# Patient Record
Sex: FEMALE | Race: WHITE | Employment: OTHER | URBAN - METROPOLITAN AREA
[De-identification: names, ages, dates, MRNs, and addresses within clinical notes are randomized per-mention and may not be internally consistent; named-entity substitution may affect disease eponyms.]

---

## 2017-01-06 ENCOUNTER — TELEPHONE (OUTPATIENT)
Dept: FAMILY MEDICINE | Facility: CLINIC | Age: 67
End: 2017-01-06

## 2017-01-06 NOTE — TELEPHONE ENCOUNTER
Per [facility] pharmacy ([phone]) patient refilled Pantoprazole in November, and Levothyroxine on 12-17, with no issues.  Not sure why patient is saying PA is needed?  LM for patient to return call to clarify. Leidy Fuentes,

## 2017-01-06 NOTE — TELEPHONE ENCOUNTER
Per Dr.Gray Sahu PA.  Pt has tried other med's besides Protonix and they worked, but they would change formulary's.  Nelda Appiah RN

## 2017-01-06 NOTE — TELEPHONE ENCOUNTER
Patient returned call.  I informed her that I had spoken with [facility] and patient will need to wait until she is due for her next refill on the Levothyroxine before we can do a PA (if one is needed).  Also pharmacy stated that they have a prescription of the Pantoprazole (60 for 30) that is ready for patient to  with a zero $ copay.  No PA is needed.  Patient informed. Leidy Fuentes,

## 2017-01-06 NOTE — TELEPHONE ENCOUNTER
Reason for call: Medication   If this is a refill request, has the caller requested the refill from the pharmacy already? Yes  Will the patient be using a [facility]? No  Name of the pharmacy and phone number for the current request: [id number] - [place], [place] - [address]    Name of the medication requested: Pantoprazole, levothyroxine    Other request: Patient states insurance will not cover levothyroxine and she needs either prior approval or something else. Also, she states insurance will not cover (2) a day of Pantoprazole as prescribed unless told by provider. Please advise on next steps.    Phone Number Pt can be reached at: Cell number on file:    Telephone Information:   Mobile [phone]     Best Time: anytime  Can we leave a detailed message on this number? YES

## 2017-01-20 DIAGNOSIS — E03.9 HYPOTHYROIDISM, UNSPECIFIED TYPE: Primary | ICD-10-CM

## 2017-01-20 NOTE — TELEPHONE ENCOUNTER
levothyroxine (SYNTHROID, LEVOTHROID) 75 MCG tablet     Last Written Prescription Date: 6/21/16  Last Quantity: 90, # refills: 1  Last Office Visit with [facility], [facility] or [facility] prescribing provider: 7/7/16        TSH   Date Value Ref Range Status   12/29/2015 1.24 0.40 - 4.00 mU/L Final

## 2017-01-24 RX ORDER — LEVOTHYROXINE SODIUM 75 UG/1
TABLET ORAL
Qty: 30 TABLET | Refills: 0 | Status: SHIPPED
Start: 2017-01-24 | End: 2017-02-27

## 2017-01-24 NOTE — TELEPHONE ENCOUNTER
Routing refill request to provider for review/approval because:  Labs not current:    TSH   Date Value Ref Range Status   12/29/2015 1.24 0.40 - 4.00 mU/L Final

## 2017-02-15 ENCOUNTER — TELEPHONE (OUTPATIENT)
Dept: FAMILY MEDICINE | Facility: CLINIC | Age: 67
End: 2017-02-15

## 2017-02-15 DIAGNOSIS — M19.041 PRIMARY OSTEOARTHRITIS OF BOTH HANDS: ICD-10-CM

## 2017-02-15 DIAGNOSIS — K21.9 GERD (GASTROESOPHAGEAL REFLUX DISEASE): Primary | ICD-10-CM

## 2017-02-15 DIAGNOSIS — M19.042 PRIMARY OSTEOARTHRITIS OF BOTH HANDS: ICD-10-CM

## 2017-02-15 NOTE — TELEPHONE ENCOUNTER
Received fax from pharmacy stating patient requires Prior Authorization for Pantoprazole sodium ec 40 mg tab .     Insurance information:   Name: Medicare  Phone number: [phone]  ID number: [id number]    Initiate prior authorization or change medication?    If a prior authorization is to be initiated, please list the following:    -any medications the patient has tried and failed or any contraindications.  -is the patient currently on this medication, or has tried before?  -What is the diagnosis?  -Justification or other information that may be helpful.

## 2017-02-15 NOTE — TELEPHONE ENCOUNTER
Received fax from pharmacy stating patient requires Prior Authorization for Diclofenac sodium 1% gel parp.     Insurance information:   Name: [employer]  Phone number: [phone]  ID number: [id number]    Initiate prior authorization or change medication?    If a prior authorization is to be initiated, please list the following:    -any medications the patient has tried and failed or any contraindications.  -is the patient currently on this medication, or has tried before?  -What is the diagnosis?  -Justification or other information that may be helpful.

## 2017-02-16 NOTE — TELEPHONE ENCOUNTER
PA done over the phone with [facility].  Was told to expect decision within 24-48 hours. Leidy Fuentes,

## 2017-02-17 ENCOUNTER — TELEPHONE (OUTPATIENT)
Dept: FAMILY MEDICINE | Facility: CLINIC | Age: 67
End: 2017-02-17

## 2017-02-20 ENCOUNTER — TELEPHONE (OUTPATIENT)
Dept: FAMILY MEDICINE | Facility: CLINIC | Age: 67
End: 2017-02-20

## 2017-02-20 NOTE — TELEPHONE ENCOUNTER
Called pharmacy and phone was busy, will call back at a later time.     Lia VAZQUEZ CMA ([facility])

## 2017-02-20 NOTE — TELEPHONE ENCOUNTER
Reason for Call:  Other prescription    Detailed comments: Patient states her pharmacy is still waiting for the prescription that would replace pantoprazole which isn't covered by her insurance. Patient states she's completely.    Phone Number Patient can be reached at: Home number on file [phone] (home)    Best Time: anytime    Can we leave a detailed message on this number? YES    Call taken on 2/20/2017 at 9:58 AM by Shanti Souza

## 2017-02-20 NOTE — TELEPHONE ENCOUNTER
Pantoprazole was already changed to Omeprazole. Patient was updated. See 2/15/17 phone encounter.    Called [facility] to get more information.  Per Alana with [facility], Omeprazole has a quantity limit. Insurance will only allow 90 caps for a 1 year supply.    Please contact patient.  She will need to contact her insurance to see what her plan covers.  Which alternative will they cover in place for Pantoprazole?  Would they cover the Pantoprazole if she was on once daily dosing as oppose to BID?  She can purchase meds OTC in the interim      Coral Milner RN

## 2017-02-21 NOTE — TELEPHONE ENCOUNTER
Attempted to reach patient recording that patient is not available unable to leave a message. Clare Kc MA

## 2017-02-24 ENCOUNTER — TELEPHONE (OUTPATIENT)
Dept: FAMILY MEDICINE | Facility: CLINIC | Age: 67
End: 2017-02-24

## 2017-02-24 NOTE — TELEPHONE ENCOUNTER
Reason for Call:  Other prescription    Detailed comments: patient states her insurance carrier will not cover the prescription Omeprazole , the patient states a call has to be made to her insurance carrier( Prior Auth). Please contact the patient to discuss further   Phone Number Patient can be reached at: Home number on file [phone] (home)    Best Time: today    Can we leave a detailed message on this number? YES    Call taken on 2/24/2017 at 1:06 PM by Wili Telles

## 2017-02-24 NOTE — TELEPHONE ENCOUNTER
Sent.  But system stated that PA is needed and prescription has not gone thru to destination as it is awaiting payer's response.    Medication Detail         Disp Refills Start End JOURDAN     diclofenac (VOLTAREN) 1 % GEL topical gel 100 g 0 2/24/2017  No     Sig: Apply  2 grams to hands four times daily using enclosed dosing card.     Class: E-Prescribe     Order: [id number]     Prior authorization: Waiting for payer response     This order has not been released to its destination.       Called pharmacy to call in the verbal prescription.  Gwen took the verbal order    Coral Milner RN

## 2017-02-24 NOTE — TELEPHONE ENCOUNTER
Letter received in mail stating PA for Diclofenac gel 1% has been approved.  Dates are 17 to 18.  Reference number is [id number].  [facility] pharmacy ([phone]) called and informed.      Pharmacy tried to run prescription and stated it  today, and will need new prescription sent in.    diclofenac (VOLTAREN) 1 % GEL     Last Written Prescription Date: 16  Last Quantity: 100g, # refills: 1  Last Office Visit with [facility], P or [facility] prescribing provider: 3-18-16       Creatinine   Date Value Ref Range Status   2016 0.89 mg/dL Final     Lab Results   Component Value Date    AST 20 10/31/2015     Lab Results   Component Value Date    ALT 18 10/31/2015     BP Readings from Last 3 Encounters:   16 137/76   10/23/16 138/82   16 114/66         Leidy Fuentes,

## 2017-02-27 DIAGNOSIS — E03.9 HYPOTHYROIDISM, UNSPECIFIED TYPE: ICD-10-CM

## 2017-02-28 RX ORDER — LEVOTHYROXINE SODIUM 75 UG/1
75 TABLET ORAL DAILY
Qty: 30 TABLET | Refills: 0 | Status: SHIPPED
Start: 2017-02-28 | End: 2017-04-03

## 2017-02-28 NOTE — TELEPHONE ENCOUNTER
levothyroxine (SYNTHROID/LEVOTHROID) 75 MCG tablet     Last Written Prescription Date: 1/24/17  Last Quantity: 30, # refills: 0  Last Office Visit with [facility], [facility] or [facility] prescribing provider: 7/7/16        TSH   Date Value Ref Range Status   12/29/2015 1.24 0.40 - 4.00 mU/L Final

## 2017-02-28 NOTE — TELEPHONE ENCOUNTER
Medication is being filled for 1 time refill only due to:  Patient needs to be seen because it has been more than one year since last visit.   Neeta Guevara RN

## 2017-03-01 NOTE — TELEPHONE ENCOUNTER
2nd message left for patient to return call.  I called [facility] pharmacy ([phone]) to see if they knew if anything is needed from us.  They stated that her insurance will only cover 90 capsules per year of the omeprazole, but that there is no issue with the pantoprazole.  Left message with patient to see if she is okay with this, or if there is something else we need to do.  Leidy Fuentes,

## 2017-03-02 ENCOUNTER — TELEPHONE (OUTPATIENT)
Dept: FAMILY MEDICINE | Facility: CLINIC | Age: 67
End: 2017-03-02

## 2017-03-02 NOTE — TELEPHONE ENCOUNTER
See 3-2-17, telephone encounter for further information.  I called [facility] ([phone]) to do PA over the phone for twice a day dosing.  This was approved effective dates of 3-2-17 to 3-2-18.  Case ID is [id number].  Patient cannot fill until March 14th, since she just had it filled yesterday.  [facility] pharmacy ([phone]) called and informed.  for patient regarding this. Leidy Fuentes,

## 2017-03-02 NOTE — TELEPHONE ENCOUNTER
Reason for Call:  Other prescription    Detailed comments: Qty limited exception is needed for the prescription Pantoprazole Sodium 40mg , per the patient insurance carrier please call Benefit prior Authorization  [phone]    [facility]      Phone Number Patient can be reached at: Cell number on file:    Telephone Information:   Mobile [phone]       Best Time: today    Can we leave a detailed message on this number? YES    Call taken on 3/2/2017 at 8:12 AM by Wili Telles

## 2017-03-10 ENCOUNTER — TELEPHONE (OUTPATIENT)
Dept: FAMILY MEDICINE | Facility: CLINIC | Age: 67
End: 2017-03-10

## 2017-03-15 DIAGNOSIS — F33.1 MAJOR DEPRESSIVE DISORDER, RECURRENT EPISODE, MODERATE (H): ICD-10-CM

## 2017-03-15 NOTE — TELEPHONE ENCOUNTER
Escitalopram     Last Written Prescription Date: 7/21/16  Last Fill Quantity: 90, # refills: 1  Last Office Visit with [facility] primary care provider:  7/7/16   Next 5 appointments (look out 90 days)     Apr 03, 2017  4:30 PM CDT   Office Visit with Frannie Allen MD   [facility] ([facility]    [address] [address]   [phone]                   Last PHQ-9 score on record=   PHQ-9 SCORE 1/2/2017   Total Score -   Total Score 4

## 2017-03-16 RX ORDER — ESCITALOPRAM OXALATE 20 MG/1
TABLET ORAL
Qty: 25 TABLET | Refills: 1 | Status: SHIPPED | OUTPATIENT
Start: 2017-03-16 | End: 2017-04-03

## 2017-03-16 NOTE — TELEPHONE ENCOUNTER
Prescription approved per [facility] Refill Protocol.  Coral Milner RN

## 2017-03-31 DIAGNOSIS — E03.9 HYPOTHYROIDISM, UNSPECIFIED TYPE: ICD-10-CM

## 2017-03-31 RX ORDER — LEVOTHYROXINE SODIUM 75 UG/1
TABLET ORAL
Qty: 30 TABLET | Refills: 0 | Status: CANCELLED | OUTPATIENT
Start: 2017-03-31

## 2017-04-03 ENCOUNTER — OFFICE VISIT (OUTPATIENT)
Dept: FAMILY MEDICINE | Facility: CLINIC | Age: 67
End: 2017-04-03
Payer: COMMERCIAL

## 2017-04-03 ENCOUNTER — RADIANT APPOINTMENT (OUTPATIENT)
Dept: MAMMOGRAPHY | Facility: CLINIC | Age: 67
End: 2017-04-03
Attending: INTERNAL MEDICINE
Payer: COMMERCIAL

## 2017-04-03 VITALS
WEIGHT: 142 LBS | TEMPERATURE: 98.5 F | OXYGEN SATURATION: 98 % | HEIGHT: 60 IN | HEART RATE: 80 BPM | SYSTOLIC BLOOD PRESSURE: 136 MMHG | BODY MASS INDEX: 27.88 KG/M2 | DIASTOLIC BLOOD PRESSURE: 74 MMHG

## 2017-04-03 DIAGNOSIS — E03.9 HYPOTHYROIDISM, UNSPECIFIED TYPE: ICD-10-CM

## 2017-04-03 DIAGNOSIS — Z12.4 SCREENING FOR MALIGNANT NEOPLASM OF CERVIX: ICD-10-CM

## 2017-04-03 DIAGNOSIS — Z12.31 VISIT FOR SCREENING MAMMOGRAM: ICD-10-CM

## 2017-04-03 DIAGNOSIS — Z23 NEED FOR PROPHYLACTIC VACCINATION WITH TETANUS-DIPHTHERIA (TD): ICD-10-CM

## 2017-04-03 DIAGNOSIS — F33.1 MAJOR DEPRESSIVE DISORDER, RECURRENT EPISODE, MODERATE (H): ICD-10-CM

## 2017-04-03 DIAGNOSIS — J43.1 PANLOBULAR EMPHYSEMA (H): ICD-10-CM

## 2017-04-03 DIAGNOSIS — N28.9 RENAL INSUFFICIENCY: ICD-10-CM

## 2017-04-03 DIAGNOSIS — I25.10 CORONARY ARTERY DISEASE INVOLVING NATIVE CORONARY ARTERY OF NATIVE HEART WITHOUT ANGINA PECTORIS: ICD-10-CM

## 2017-04-03 DIAGNOSIS — Z00.00 ROUTINE GENERAL MEDICAL EXAMINATION AT A HEALTH CARE FACILITY: Primary | ICD-10-CM

## 2017-04-03 DIAGNOSIS — F43.9 STRESS: ICD-10-CM

## 2017-04-03 DIAGNOSIS — F41.9 ANXIETY: ICD-10-CM

## 2017-04-03 DIAGNOSIS — E55.9 VITAMIN D DEFICIENCY: ICD-10-CM

## 2017-04-03 DIAGNOSIS — Z12.11 SCREEN FOR COLON CANCER: ICD-10-CM

## 2017-04-03 DIAGNOSIS — Z11.59 NEED FOR HEPATITIS C SCREENING TEST: ICD-10-CM

## 2017-04-03 DIAGNOSIS — F52.31 INHIBITED ORGASM FEMALE: ICD-10-CM

## 2017-04-03 DIAGNOSIS — E78.49 FAMILIAL COMBINED HYPERLIPIDEMIA: Primary | ICD-10-CM

## 2017-04-03 DIAGNOSIS — R53.83 FATIGUE, UNSPECIFIED TYPE: ICD-10-CM

## 2017-04-03 LAB
CHOLEST SERPL-MCNC: 190 MG/DL
ERYTHROCYTE [DISTWIDTH] IN BLOOD BY AUTOMATED COUNT: 14.2 % (ref 10–15)
HCT VFR BLD AUTO: 36.5 % (ref 35–47)
HDLC SERPL-MCNC: 84 MG/DL
HGB BLD-MCNC: 12.4 G/DL (ref 11.7–15.7)
LDLC SERPL CALC-MCNC: 85 MG/DL
MCH RBC QN AUTO: 32 PG (ref 26.5–33)
MCHC RBC AUTO-ENTMCNC: 34 G/DL (ref 31.5–36.5)
MCV RBC AUTO: 94 FL (ref 78–100)
NONHDLC SERPL-MCNC: 106 MG/DL
PLATELET # BLD AUTO: 306 10E9/L (ref 150–450)
RBC # BLD AUTO: 3.87 10E12/L (ref 3.8–5.2)
TRIGL SERPL-MCNC: 106 MG/DL
WBC # BLD AUTO: 6.3 10E9/L (ref 4–11)

## 2017-04-03 PROCEDURE — 82306 VITAMIN D 25 HYDROXY: CPT | Performed by: INTERNAL MEDICINE

## 2017-04-03 PROCEDURE — 86803 HEPATITIS C AB TEST: CPT | Performed by: INTERNAL MEDICINE

## 2017-04-03 PROCEDURE — 90715 TDAP VACCINE 7 YRS/> IM: CPT | Performed by: INTERNAL MEDICINE

## 2017-04-03 PROCEDURE — 36415 COLL VENOUS BLD VENIPUNCTURE: CPT | Performed by: INTERNAL MEDICINE

## 2017-04-03 PROCEDURE — 80061 LIPID PANEL: CPT | Performed by: INTERNAL MEDICINE

## 2017-04-03 PROCEDURE — G0202 SCR MAMMO BI INCL CAD: HCPCS | Mod: TC

## 2017-04-03 PROCEDURE — 84460 ALANINE AMINO (ALT) (SGPT): CPT | Performed by: INTERNAL MEDICINE

## 2017-04-03 PROCEDURE — 90471 IMMUNIZATION ADMIN: CPT | Performed by: INTERNAL MEDICINE

## 2017-04-03 PROCEDURE — 87624 HPV HI-RISK TYP POOLED RSLT: CPT | Performed by: INTERNAL MEDICINE

## 2017-04-03 PROCEDURE — 80048 BASIC METABOLIC PNL TOTAL CA: CPT | Performed by: INTERNAL MEDICINE

## 2017-04-03 PROCEDURE — 99396 PREV VISIT EST AGE 40-64: CPT | Mod: 25 | Performed by: INTERNAL MEDICINE

## 2017-04-03 PROCEDURE — 84443 ASSAY THYROID STIM HORMONE: CPT | Performed by: INTERNAL MEDICINE

## 2017-04-03 PROCEDURE — 85027 COMPLETE CBC AUTOMATED: CPT | Performed by: INTERNAL MEDICINE

## 2017-04-03 PROCEDURE — G0145 SCR C/V CYTO,THINLAYER,RESCR: HCPCS | Performed by: INTERNAL MEDICINE

## 2017-04-03 RX ORDER — ESCITALOPRAM OXALATE 20 MG/1
20 TABLET ORAL DAILY
Qty: 90 TABLET | Refills: 1 | Status: SHIPPED | OUTPATIENT
Start: 2017-04-03 | End: 2017-08-29

## 2017-04-03 RX ORDER — METOPROLOL SUCCINATE 50 MG/1
50 TABLET, EXTENDED RELEASE ORAL DAILY
Qty: 90 TABLET | Refills: 3 | Status: SHIPPED | OUTPATIENT
Start: 2017-04-03

## 2017-04-03 RX ORDER — ISOSORBIDE MONONITRATE 30 MG/1
60 TABLET, EXTENDED RELEASE ORAL DAILY
Qty: 180 TABLET | Refills: 1 | Status: SHIPPED | OUTPATIENT
Start: 2017-04-03 | End: 2017-11-13

## 2017-04-03 RX ORDER — LEVOTHYROXINE SODIUM 75 UG/1
75 TABLET ORAL DAILY
Qty: 90 TABLET | Refills: 3 | Status: SHIPPED | OUTPATIENT
Start: 2017-04-03 | End: 2018-03-13

## 2017-04-03 RX ORDER — NITROGLYCERIN 0.4 MG/1
0.4 TABLET SUBLINGUAL EVERY 5 MIN PRN
Qty: 25 TABLET | Refills: 11 | Status: SHIPPED | OUTPATIENT
Start: 2017-04-03

## 2017-04-03 RX ORDER — LORAZEPAM 0.5 MG/1
TABLET ORAL
Qty: 30 TABLET | Refills: 1 | Status: SHIPPED | OUTPATIENT
Start: 2017-04-03 | End: 2017-08-29

## 2017-04-03 ASSESSMENT — PAIN SCALES - GENERAL: PAINLEVEL: NO PAIN (0)

## 2017-04-03 NOTE — PATIENT INSTRUCTIONS
Complete and return your FIT stool test for colon cancer screening.    Consider having your Shingles vaccine. Call your insurance and see if they will cover it first. If you decide to go ahead with the vaccine you can get it at the pharmacy.    Ask your insurance if they will cover Spiriva.     Preventive Health Recommendations  Female Ages 50 - 64    Yearly exam: See your health care provider every year in order to  o Review health changes.   o Discuss preventive care.    o Review your medicines if your doctor has prescribed any.      Get a Pap test every three years (unless you have an abnormal result and your provider advises testing more often).    If you get Pap tests with HPV test, you only need to test every 5 years, unless you have an abnormal result.     You do not need a Pap test if your uterus was removed (hysterectomy) and you have not had cancer.    You should be tested each year for STDs (sexually transmitted diseases) if you're at risk.     Have a mammogram every 1 to 2 years.    Have a colonoscopy at age 50, or have a yearly FIT test (stool test). These exams screen for colon cancer.      Have a cholesterol test every 5 years, or more often if advised.    Have a diabetes test (fasting glucose) every three years. If you are at risk for diabetes, you should have this test more often.     If you are at risk for osteoporosis (brittle bone disease), think about having a bone density scan (DEXA).    Shots: Get a flu shot each year. Get a tetanus shot every 10 years.    Nutrition:     Eat at least 5 servings of fruits and vegetables each day.    Eat whole-grain bread, whole-wheat pasta and brown rice instead of white grains and rice.    Talk to your provider about Calcium and Vitamin D.     Lifestyle    Exercise at least 150 minutes a week (30 minutes a day, 5 days a week). This will help you control your weight and prevent disease.    Limit alcohol to one drink per day.    No smoking.     Wear sunscreen to  prevent skin cancer.     See your dentist every six months for an exam and cleaning.    See your eye doctor every 1 to 2 years.    Preventive Health Recommendations  Female Ages 50 - 64    Yearly exam: See your health care provider every year in order to  o Review health changes.   o Discuss preventive care.    o Review your medicines if your doctor has prescribed any.      Get a Pap test every three years (unless you have an abnormal result and your provider advises testing more often).    If you get Pap tests with HPV test, you only need to test every 5 years, unless you have an abnormal result.     You do not need a Pap test if your uterus was removed (hysterectomy) and you have not had cancer.    You should be tested each year for STDs (sexually transmitted diseases) if you're at risk.     Have a mammogram every 1 to 2 years.    Have a colonoscopy at age 50, or have a yearly FIT test (stool test). These exams screen for colon cancer.      Have a cholesterol test every 5 years, or more often if advised.    Have a diabetes test (fasting glucose) every three years. If you are at risk for diabetes, you should have this test more often.     If you are at risk for osteoporosis (brittle bone disease), think about having a bone density scan (DEXA).    Shots: Get a flu shot each year. Get a tetanus shot every 10 years.    Nutrition:     Eat at least 5 servings of fruits and vegetables each day.    Eat whole-grain bread, whole-wheat pasta and brown rice instead of white grains and rice.    Talk to your provider about Calcium and Vitamin D.     Lifestyle    Exercise at least 150 minutes a week (30 minutes a day, 5 days a week). This will help you control your weight and prevent disease.    Limit alcohol to one drink per day.    No smoking.     Wear sunscreen to prevent skin cancer.     See your dentist every six months for an exam and cleaning.    See your eye doctor every 1 to 2 years.    Preventive Health  Recommendations  Female Ages 50 - 64    Yearly exam: See your health care provider every year in order to  o Review health changes.   o Discuss preventive care.    o Review your medicines if your doctor has prescribed any.      Get a Pap test every three years (unless you have an abnormal result and your provider advises testing more often).    If you get Pap tests with HPV test, you only need to test every 5 years, unless you have an abnormal result.     You do not need a Pap test if your uterus was removed (hysterectomy) and you have not had cancer.    You should be tested each year for STDs (sexually transmitted diseases) if you're at risk.     Have a mammogram every 1 to 2 years.    Have a colonoscopy at age 50, or have a yearly FIT test (stool test). These exams screen for colon cancer.      Have a cholesterol test every 5 years, or more often if advised.    Have a diabetes test (fasting glucose) every three years. If you are at risk for diabetes, you should have this test more often.     If you are at risk for osteoporosis (brittle bone disease), think about having a bone density scan (DEXA).    Shots: Get a flu shot each year. Get a tetanus shot every 10 years.    Nutrition:     Eat at least 5 servings of fruits and vegetables each day.    Eat whole-grain bread, whole-wheat pasta and brown rice instead of white grains and rice.    Talk to your provider about Calcium and Vitamin D.     Lifestyle    Exercise at least 150 minutes a week (30 minutes a day, 5 days a week). This will help you control your weight and prevent disease.    Limit alcohol to one drink per day.    No smoking.     Wear sunscreen to prevent skin cancer.     See your dentist every six months for an exam and cleaning.    See your eye doctor every 1 to 2 years.      [facility]    If you have any questions regarding to your visit please contact your care team:     Team Pink:   Clinic Hours Telephone Number   Internal Medicine:    Frannie Edwards, NP       7am-7pm  Monday - Thursday   7am-5pm  Fridays  [phone]  (Appointment scheduling available 24/7)    Questions about your visit?  Team Line  [phone]   Urgent Care - [place] [facility] - 11am-9pm Monday-Friday Saturday-Sunday- 9am-5pm   [place] - 5pm-9pm Monday-Friday Saturday-Sunday- 9am-5pm  [phone] - [place]   [phone] - [place]       What options do I have for visits at the clinic other than the traditional office visit?  To expand how we care for you, many of our providers are utilizing electronic visits (e-visits) and telephone visits, when medically appropriate, for interactions with their patients rather than a visit in the clinic.   We also offer nurse visits for many medical concerns. Just like any other service, we will bill your insurance company for this type of visit based on time spent on the phone with your provider. Not all insurance companies cover these visits. Please check with your medical insurance if this type of visit is covered. You will be responsible for any charges that are not paid by your insurance.      E-visits via [employer]:  generally incur a $35.00 fee.  Telephone visits:  Time spent on the phone: *charged based on time that is spent on the phone in increments of 10 minutes. Estimated cost:   5-10 mins $30.00   11-20 mins. $59.00   21-30 mins. $85.00   Use [employer] (secure email communication and access to your chart) to send your primary care provider a message or make an appointment. Ask someone on your Team how to sign up for [employer].    For a Price Quote for your services, please call our Consumer Price Line at [phone].    As always, Thank you for trusting us with your health care needs!    Discharged By:  TRAN SALINAS

## 2017-04-03 NOTE — MR AVS SNAPSHOT
After Visit Summary   4/3/2017    Michela Cole    MRN: [id number]           Patient Information     Date Of Birth          1950        Visit Information        Provider Department      4/3/2017 4:30 PM Frannie Allen MD [facility]        Today's Diagnoses     Routine general medical examination at a health care facility    -  1    Screen for colon cancer        Visit for screening mammogram        Screening for malignant neoplasm of cervix        Need for hepatitis C screening test        Need for prophylactic vaccination with tetanus-diphtheria (TD)        Hypothyroidism, unspecified type        Major depressive disorder, recurrent episode, moderate (H)        Fatigue, unspecified type        Stress        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        Anxiety        Panlobular emphysema (H)        Vitamin D deficiency        Coronary artery disease involving native coronary artery of native heart without angina pectoris          Care Instructions    Complete and return your FIT stool test for colon cancer screening.    Consider having your Shingles vaccine. Call your insurance and see if they will cover it first. If you decide to go ahead with the vaccine you can get it at the pharmacy.    Ask your insurance if they will cover Spiriva.     Preventive Health Recommendations  Female Ages 50 - 64    Yearly exam: See your health care provider every year in order to  o Review health changes.   o Discuss preventive care.    o Review your medicines if your doctor has prescribed any.      Get a Pap test every three years (unless you have an abnormal result and your provider advises testing more often).    If you get Pap tests with HPV test, you only need to test every 5 years, unless you have an abnormal result.     You do not need a Pap test if your uterus was removed (hysterectomy) and you have not had cancer.    You should be tested each year for STDs (sexually transmitted diseases) if you're  at risk.     Have a mammogram every 1 to 2 years.    Have a colonoscopy at age 50, or have a yearly FIT test (stool test). These exams screen for colon cancer.      Have a cholesterol test every 5 years, or more often if advised.    Have a diabetes test (fasting glucose) every three years. If you are at risk for diabetes, you should have this test more often.     If you are at risk for osteoporosis (brittle bone disease), think about having a bone density scan (DEXA).    Shots: Get a flu shot each year. Get a tetanus shot every 10 years.    Nutrition:     Eat at least 5 servings of fruits and vegetables each day.    Eat whole-grain bread, whole-wheat pasta and brown rice instead of white grains and rice.    Talk to your provider about Calcium and Vitamin D.     Lifestyle    Exercise at least 150 minutes a week (30 minutes a day, 5 days a week). This will help you control your weight and prevent disease.    Limit alcohol to one drink per day.    No smoking.     Wear sunscreen to prevent skin cancer.     See your dentist every six months for an exam and cleaning.    See your eye doctor every 1 to 2 years.    Preventive Health Recommendations  Female Ages 50 - 64    Yearly exam: See your health care provider every year in order to  o Review health changes.   o Discuss preventive care.    o Review your medicines if your doctor has prescribed any.      Get a Pap test every three years (unless you have an abnormal result and your provider advises testing more often).    If you get Pap tests with HPV test, you only need to test every 5 years, unless you have an abnormal result.     You do not need a Pap test if your uterus was removed (hysterectomy) and you have not had cancer.    You should be tested each year for STDs (sexually transmitted diseases) if you're at risk.     Have a mammogram every 1 to 2 years.    Have a colonoscopy at age 50, or have a yearly FIT test (stool test). These exams screen for colon cancer.       Have a cholesterol test every 5 years, or more often if advised.    Have a diabetes test (fasting glucose) every three years. If you are at risk for diabetes, you should have this test more often.     If you are at risk for osteoporosis (brittle bone disease), think about having a bone density scan (DEXA).    Shots: Get a flu shot each year. Get a tetanus shot every 10 years.    Nutrition:     Eat at least 5 servings of fruits and vegetables each day.    Eat whole-grain bread, whole-wheat pasta and brown rice instead of white grains and rice.    Talk to your provider about Calcium and Vitamin D.     Lifestyle    Exercise at least 150 minutes a week (30 minutes a day, 5 days a week). This will help you control your weight and prevent disease.    Limit alcohol to one drink per day.    No smoking.     Wear sunscreen to prevent skin cancer.     See your dentist every six months for an exam and cleaning.    See your eye doctor every 1 to 2 years.    Preventive Health Recommendations  Female Ages 50 - 64    Yearly exam: See your health care provider every year in order to  o Review health changes.   o Discuss preventive care.    o Review your medicines if your doctor has prescribed any.      Get a Pap test every three years (unless you have an abnormal result and your provider advises testing more often).    If you get Pap tests with HPV test, you only need to test every 5 years, unless you have an abnormal result.     You do not need a Pap test if your uterus was removed (hysterectomy) and you have not had cancer.    You should be tested each year for STDs (sexually transmitted diseases) if you're at risk.     Have a mammogram every 1 to 2 years.    Have a colonoscopy at age 50, or have a yearly FIT test (stool test). These exams screen for colon cancer.      Have a cholesterol test every 5 years, or more often if advised.    Have a diabetes test (fasting glucose) every three years. If you are at risk for diabetes,  you should have this test more often.     If you are at risk for osteoporosis (brittle bone disease), think about having a bone density scan (DEXA).    Shots: Get a flu shot each year. Get a tetanus shot every 10 years.    Nutrition:     Eat at least 5 servings of fruits and vegetables each day.    Eat whole-grain bread, whole-wheat pasta and brown rice instead of white grains and rice.    Talk to your provider about Calcium and Vitamin D.     Lifestyle    Exercise at least 150 minutes a week (30 minutes a day, 5 days a week). This will help you control your weight and prevent disease.    Limit alcohol to one drink per day.    No smoking.     Wear sunscreen to prevent skin cancer.     See your dentist every six months for an exam and cleaning.    See your eye doctor every 1 to 2 years.      [facility]    If you have any questions regarding to your visit please contact your care team:     Team Pink:   Clinic Hours Telephone Number   Internal Medicine:  Dr. Frannie Edwards, NP       7am-7pm  Monday - Thursday   7am-5pm  Fridays  [phone]  (Appointment scheduling available 24/7)    Questions about your visit?  Team Line  [phone]   Urgent Care - [place] - 11am-9pm Monday-Friday Saturday-Sunday- 9am-5pm   [place] - 5pm-9pm Monday-Friday Saturday-Sunday- 9am-5pm  [phone] - [place]   [phone] - [place]       What options do I have for visits at the clinic other than the traditional office visit?  To expand how we care for you, many of our providers are utilizing electronic visits (e-visits) and telephone visits, when medically appropriate, for interactions with their patients rather than a visit in the clinic.   We also offer nurse visits for many medical concerns. Just like any other service, we will bill your insurance company for this type of visit based on time spent on the phone with your provider. Not all  insurance companies cover these visits. Please check with your medical insurance if this type of visit is covered. You will be responsible for any charges that are not paid by your insurance.      E-visits via [employer]:  generally incur a $35.00 fee.  Telephone visits:  Time spent on the phone: *charged based on time that is spent on the phone in increments of 10 minutes. Estimated cost:   5-10 mins $30.00   11-20 mins. $59.00   21-30 mins. $85.00   Use Colingo (secure email communication and access to your chart) to send your primary care provider a message or make an appointment. Ask someone on your Team how to sign up for Colingo.    For a Price Quote for your services, please call our [employer] Line at [phone].    As always, Thank you for trusting us with your health care needs!    Discharged By:  TRAN SALINAS          Follow-ups after your visit        Your next 10 appointments already scheduled     Jun 06, 2017 10:00 AM CDT   US PELVIC COMPLETE W TRANSVAGINAL with MGUS1, MG  [employer]   [facility]    [address] [place] [address] [phone]           Please bring a list of your medicines (including vitamins, minerals and over-the-counter drugs). Also, tell your doctor about any allergies you may have. Wear comfortable clothes and leave your valuables at home.  Adults: Drink six 8-ounce glasses of fluid one hour before your exam. Do NOT empty your bladder.  If you need to empty your bladder before your exam, try to release only a little bit of urine. Then, drink another 8oz glass of fluid.  Children: Children who are potty trained should drink at least 4 cups (32 oz) of liquid 45 minutes to one hour prior to the exam. The child s bladder must be full in order to achieve a diagnostic exam. If your child is very uncomfortable or has an urgent need to pee, please notify a technologist; they will try to find out how much longer the wait may be  "and provide instructions to help relieve the pressure. Occasionally it is medically necessary to insert a urinary catheter to fill the bladder.  Please call the Imaging Department at your exam site with any questions.            Jun 06, 2017 11:30 AM CDT   (Arrive by 11:15 AM)   Return Visit with Melody Bolivar MD   [facility] ([facility])    [address] [address] [phone]              Future tests that were ordered for you today     Open Future Orders        Priority Expected Expires Ordered    Fecal colorectal cancer screen FIT Routine 4/24/2017 6/26/2017 4/3/2017            Who to contact     If you have questions or need follow up information about today's clinic visit or your schedule please contact [facility] [place] [facility]\"" directly at [phone].  Normal or non-critical lab and imaging results will be communicated to you by MyChart, letter or phone within 4 business days after the clinic has received the results. If you do not hear from us within 7 days, please contact the clinic through MyChart or phone. If you have a critical or abnormal lab result, we will notify you by phone as soon as possible.  Submit refill requests through Snipi or call your pharmacy and they will forward the refill request to us. Please allow 3 business days for your refill to be completed.          Additional Information About Your Visit        Snipi Information     Snipi lets you send messages to your doctor, view your test results, renew your prescriptions, schedule appointments and more. To sign up, go to [email or web address] . Click on \"Log in\" on the left side of the screen, which will take you to the Welcome page. Then click on \"Sign up Now\" on the right side of the page.     You will be asked to enter the access code listed below, as well as some personal information. Please follow the directions to create your username and " "password.     Your access code is: [email or web address]  Expires: 2017  5:08 PM     Your access code will  in 90 days. If you need help or a new code, please call your [place] clinic or [phone].        Care EveryWhere ID     This is your Care EveryWhere ID. This could be used by other organizations to access your [place] medical records  [id number]        Your Vitals Were     Pulse Temperature Height Pulse Oximetry BMI (Body Mass Index)       80 98.5  F (36.9  C) (Oral) 4' 11.5\" (1.511 m) 98% 28.2 kg/m2        Blood Pressure from Last 3 Encounters:   17 136/74   16 137/76   10/23/16 138/82    Weight from Last 3 Encounters:   17 142 lb (64.4 kg)   16 141 lb (64 kg)   10/25/16 139 lb (63 kg)              We Performed the Following     ALT     Basic metabolic panel     CBC with platelets     Hepatitis C Screen Reflex to HCV RNA Quant and Genotype     HPV High Risk Types DNA Cervical     Pap imaged thin layer screen with HPV - recommended age 30 - 65 years (select HPV order below)     TDAP VACCINE (ADACEL)     TSH WITH FREE T4 REFLEX     Vitamin D Deficiency          Today's Medication Changes          These changes are accurate as of: 4/3/17  5:08 PM.  If you have any questions, ask your nurse or doctor.               These medicines have changed or have updated prescriptions.        Dose/Directions    escitalopram 20 MG tablet   Commonly known as:  LEXAPRO   This may have changed:  See the new instructions.   Used for:  Major depressive disorder, recurrent episode, moderate (H)   Changed by:  Frannie Allen MD        Dose:  20 mg   Take 1 tablet (20 mg) by mouth daily   Quantity:  90 tablet   Refills:  1       levothyroxine 75 MCG tablet   Commonly known as:  SYNTHROID/LEVOTHROID   This may have changed:  additional instructions   Used for:  Hypothyroidism, unspecified type   Changed by:  Frannie Allen MD        Dose:  75 mcg   Take 1 tablet (75 mcg) by mouth daily   Quantity:  " 90 tablet   Refills:  3       nitroglycerin 0.4 MG sublingual tablet   Commonly known as:  NITROSTAT   This may have changed:    - when to take this  - reasons to take this   Used for:  Coronary artery disease involving native coronary artery of native heart without angina pectoris   Changed by:  Frannie Allen MD        Dose:  0.4 mg   Place 1 tablet (0.4 mg) under the tongue every 5 minutes as needed for chest pain   Quantity:  25 tablet   Refills:  11       order for DME   This may have changed:  Another medication with the same name was removed. Continue taking this medication, and follow the directions you see here.   Used for:  Pneumonia of right middle lobe due to infectious organism   Changed by:  Frannie Allen MD        Equipment being ordered: incentive spirometry   Quantity:  1 Device   Refills:  0         Stop taking these medicines if you haven't already. Please contact your care team if you have questions.     predniSONE 20 MG tablet   Commonly known as:  DELTASONE   Stopped by:  Frannie Allen MD                Where to get your medicines      These medications were sent to [id number] - [place], [place] - [address]  [address] [address]     Phone:  [phone]     escitalopram 20 MG tablet    isosorbide mononitrate 30 MG 24 hr tablet    levothyroxine 75 MCG tablet    metoprolol 50 MG 24 hr tablet    nitroglycerin 0.4 MG sublingual tablet         Some of these will need a paper prescription and others can be bought over the counter.  Ask your nurse if you have questions.     Bring a paper prescription for each of these medications     LORazepam 0.5 MG tablet                Primary Care Provider Office Phone # Fax #    Frannie Allen [phone]       [address] [address]        Goals        General    Functional (pt-stated)     Notes - Note created  9/1/2015 11:20 AM by Janneth Orellana RN    I will  rinse my mouth out after using my inhaler to help keep the lining of my mouth healthy  As of today's date 9/1/2015 goal is met at 0 - 25%.   Goal Status:  Ongoing      Medication (pt-stated)     Notes - Note edited  9/1/2015  2:10 PM by Janneth Orellana RN    I will use my inhalers as instructed  As of today's date 9/1/2015 goal is met at 0 - 25%.   Goal Status:  Activeedule  Appointment with ENT       Psychosocial (pt-stated)     Notes - Note edited  9/1/2015  2:10 PM by Janneth Orellana RN    I will  Investigate counseling and/or support groups if and when I am ready to get formal support  As of today's date 9/1/2015 goal is met at 0 - 25%.   Goal Status:  Ongoing        Thank you!     Thank you for choosing [facility]  for your care. Our goal is always to provide you with excellent care. Hearing back from our patients is one way we can continue to improve our services. Please take a few minutes to complete the written survey that you may receive in the mail after your visit with us. Thank you!             Your Updated Medication List - Protect others around you: Learn how to safely use, store and throw away your medicines at www.disposemymeds.org.          This list is accurate as of: 4/3/17  5:08 PM.  Always use your most recent med list.                   Brand Name Dispense Instructions for use    albuterol 108 (90 BASE) MCG/ACT Inhaler    PROAIR HFA/PROVENTIL HFA/VENTOLIN HFA    2 Inhaler    Inhale 2 puffs into the lungs every 6 hours as needed for shortness of breath / dyspnea       aspirin EC 81 MG EC tablet      Take 81 mg by mouth       atorvastatin 40 MG tablet    LIPITOR         buPROPion 200 MG 12 hr tablet    WELLBUTRIN SR    60 tablet    TAKE 1 TABLET (200 MG) BY ORAL ROUTE 2 TIMES PER DAY       cyclobenzaprine 5 MG tablet    FLEXERIL    60 tablet    Take 1 tablet (5 mg) by mouth 3 times daily as needed for muscle spasms       diclofenac 1 % Gel topical gel    VOLTAREN    100 g     Apply  2 grams to hands four times daily using enclosed dosing card.       EFFIENT 10 MG Tabs tablet   Generic drug:  prasugrel     30 tablet    Take by mouth daily       escitalopram 20 MG tablet    LEXAPRO    90 tablet    Take 1 tablet (20 mg) by mouth daily       ipratropium - albuterol 0.5 mg/2.5 mg/3 mL 0.5-2.5 (3) MG/3ML neb solution    DUONEB    30 vial    Take 3 mLs by nebulization every 6 hours as needed for shortness of breath / dyspnea       isosorbide mononitrate 30 MG 24 hr tablet    IMDUR    180 tablet    Take 2 tablets (60 mg) by mouth daily       levothyroxine 75 MCG tablet    SYNTHROID/LEVOTHROID    90 tablet    Take 1 tablet (75 mcg) by mouth daily       LORazepam 0.5 MG tablet    ATIVAN    30 tablet    TAKE 1/2-1 TABLET BY MOUTH EVERY 8 HOURS AS NEEDED FOR ANXIETY. DO NOT OPERATE A VEHICLE AFTER TAKING THIS MEDICATION       meclizine 25 MG tablet    ANTIVERT    30 tablet    Take 1 tablet (25 mg) by mouth every 6 hours as needed for dizziness       metoprolol 50 MG 24 hr tablet    TOPROL-XL    90 tablet    Take 1 tablet (50 mg) by mouth daily       nitroglycerin 0.4 MG sublingual tablet    NITROSTAT    25 tablet    Place 1 tablet (0.4 mg) under the tongue every 5 minutes as needed for chest pain       omeprazole 20 MG CR capsule    priLOSEC    90 capsule    Take 1 capsule (20 mg) by mouth daily       order for DME     1 Device    Equipment being ordered: incentive spirometry

## 2017-04-03 NOTE — TELEPHONE ENCOUNTER
levothyroxine (SYNTHROID/LEVOTHROID) 75 MCG tablet  Last Written Prescription Date: 2/28/17  Last Quantity: 30, # refills: 0  Last Office Visit with [facility] or [facility] prescribing provider: 3/18/16   Next 5 appointments (look out 90 days)     Apr 03, 2017  4:30 PM CDT   [facility]    [address] [address] [phone]                   TSH   Date Value Ref Range Status   12/29/2015 1.24 0.40 - 4.00 mU/L Final

## 2017-04-03 NOTE — LETTER
[facility]    04/03/17    Patient: Michela Cole  YOB: 1950  Medical Record Number: [id number]                                                                  Controlled Substance Agreement  I understand that my care provider has prescribed controlled substances (narcotics, tranquilizers, and/or stimulants) to help manage my condition(s).  I am taking this medicine to help me function or work.  I know that this is strong medicine.  It could have serious side effects and even cause a dependency on the drug.  If I stop these medicines suddenly, I could have severe withdrawal symptoms.    The risks, benefits, and side effects of these medication(s) were explained to me.  I agree that:  1. I will take part in other treatments as advised by my provider.  This may be psychiatry or counseling, physical therapy, behavioral therapy, group treatment, or a referral to a pain clinic.  I will reduce or stop my medicine when my provider tells me to do so.   2. I will take my medicines as prescribed.  I will not change the dose or schedule unless my provider tells me to.  There will be no refills if I  run out early.   I may be contacted at any time without warning and asked to complete a drug test or pill count.   3. I will keep all my appointments at the clinic.  If I miss appointments or fail to follow instructions, my provider may stop my medicine.  4. I will not ask other providers to prescribe controlled substances. And I will not accept controlled substances from other people. If I need another prescribed controlled substance for a new reason, I will notify my provider within one business day.  5. If I enroll in the [place] Medical Marijuana program, I will tell my provider.  I will also sign an agreement to share my medical records with my provider.  6. I will use one pharmacy to fill all of my controlled substance prescriptions.  If my prescription is mailed to my pharmacy, it may take  5 to 7 days for my medicine to be ready.  7. I understand that my provider, clinic care team, and pharmacy can track controlled substance prescriptions from other providers through a central database (prescription monitoring program).  8. I will bring in my list of medications (or my medicine bottles) each time I come to the clinic.  REV- 04/2016                                                                                                                                            Page 1 of 2      [facility]    04/03/17    Patient: Michela Cole  YOB: 1950  Medical Record Number: [id number]    9. Refills of controlled substances will be made only during office hours.  It is up to me to make sure that I do not run out of my medicines on weekends or holidays.    10. I am responsible for my prescriptions.  If the medicine is lost or stolen, it will not be replaced.   I also agree not to share these medicines with anyone.  11. I agree to not use ANY illegal or recreational drugs.  This includes marijuana, cocaine, bath salts or other drugs.  I agree not to use alcohol unless my provider says I may.  I agree to give urine samples whenever asked.  If I fail to give a urine sample, the provider may stop my medicine.     12. I will tell my nurse or provider right away if I become pregnant or have a new medical problem treated outside of [facility].  13. I understand that this medicine can affect my thinking and judgment.  It may be unsafe for me to drive, use machinery and do dangerous tasks.  I will not do any of these things until I know how the medicine affects me.  If my dose changes, I will wait to see how it affects me.  I will contact my provider if I have concerns about medicine side effects.  I understand that if I do not follow any of the conditions above, my prescriptions or treatment may be stopped.    I agree that my provider, clinic care team, and pharmacy may work with  any city, state or federal law enforcement agency that investigates the misuse, sale, or other diversion of my controlled medicine. I will allow my provider to discuss my care with or share a copy of this agreement with any other treating provider, pharmacy or emergency room where I receive care.  I agree to give up (waive) any right of privacy or confidentiality with respect to these authorizations.   I have read this agreement and have asked questions about anything I did not understand.   ___________________________________    ___________________________  Patient Signature                                                           Date and Time  ___________________________________     ____________________________  Witness                                                                            Date and Time  ___________________________________  Frannie Allen MD  REV-  04/2016                                                                                                                                                                 Page 2 of 2

## 2017-04-03 NOTE — NURSING NOTE
"Chief Complaint   Patient presents with     Physical       Initial /74  Pulse 80  Temp 98.5  F (36.9  C) (Oral)  Ht 4' 11.5\" (1.511 m)  Wt 142 lb (64.4 kg)  SpO2 98%  BMI 28.2 kg/m2 Estimated body mass index is 28.2 kg/(m^2) as calculated from the following:    Height as of this encounter: 4' 11.5\" (1.511 m).    Weight as of this encounter: 142 lb (64.4 kg).  Medication Reconciliation: complete   Kaylin Blackman MA    "

## 2017-04-03 NOTE — PROGRESS NOTES
INTERNAL MEDICINE   SUBJECTIVE:     CC: Michela Cole is an 66 year old woman who presents for preventive health visit.     Healthy Habits:    Do you get at least three servings of calcium containing foods daily (dairy, green leafy vegetables, etc.)? yes    Amount of exercise or daily activities, outside of work: walks daily    Problems taking medications regularly No    Medication side effects: No    Have you had an eye exam in the past two years? yes    Do you see a dentist twice per year? yes    Do you have sleep apnea, excessive snoring or daytime drowsiness?Drowsiness      Health wise patient has been doing well.     Mood - Right now her biggest katz is life stressors with her children's bad decisions and having to take care of them. It is affecting her mood. She notes it is not her and she is not concerned she is going to do anything physically.     Medications  - Patient has not had any nitroglycerin since September 2014. Requesting new prescription.  - Effient since having blood clot last fall.  - Only using Ativan as needed.  - Voltaren gel is not working as well to relieve the aches in her hands from arthritis.     Fatigue - Patient mentions she is tired all the time, likely dt not sleeping at night.   Has tried to keep up with exercises walking the dogs.    Ovarian cyst - She is scheduled to have another US in June for assessment of the ovarian cyst. Patient has debated simply getting it removed. She is unable to have an orgasm. Otherwise denies pain with intercourse or other issues.     COPD - Breathing has become more difficult. Walking short distances leaves her SOB. Patient notes she is not using her inhalers anymore dt insurance changes with Spiriva. Once and awhile she pulls out her Proair inhaler if it gets really bad. She feels like someone is always sitting on her chest.    Previously had yellow jaundice that she was hospitalized with back in school- likely hepatitis A.       Today's  PHQ-2 Score:   PHQ-2 ( 1999 Pfizer) 4/3/2017 2/1/2016   Q1: Little interest or pleasure in doing things 3 0   Q2: Feeling down, depressed or hopeless 3 0   PHQ-2 Score 6 0     Abuse: Current or Past(Physical, Sexual or Emotional)- No  Do you feel safe in your environment - Yes    Social History   Substance Use Topics     Smoking status: Former Smoker     Packs/day: 0.25     Years: 42.00     Types: Cigarettes     Quit date: 6/24/2013     Smokeless tobacco: Never Used     Alcohol use 0.0 oz/week     0 Standard drinks or equivalent per week      Comment: a few drinks weekly     The patient does not drink >3 drinks per day nor >7 drinks per week.    Recent Labs   Lab Test  04/03/17   0845 08/20/14 04/28/14   0956  11/18/13   1204   CHOL  190  196  193  225*   HDL  84  66  87  88   LDL  85  113  94  109   TRIG  106  83  61  140   CHOLHDLRATIO   --    --   2.2  2.6   NHDL  106   --    --    --    Reviewed orders with patient.  Reviewed health maintenance and updated orders accordingly - Yes    Mammo Decision Support:  Patient over age 50, mutual decision to screen reflected in health maintenance.  Pertinent mammograms are reviewed under the imaging tab.  History of abnormal Pap smear:   NO - age 30- 65 PAP every 3 years recommended  Last 3 Pap Results:   PAP (no units)   Date Value   11/18/2013 NIL   Reviewed and updated as needed this visit by clinical staff  Reviewed and updated as needed this visit by Provider      ROS:   ROS: 10 point ROS neg other than the symptoms noted above in the HPI.    This document serves as a record of the services and decisions personally performed and made by Frannie Allen MD. It was created on his/her behalf by Isatu Stout, trained medical scribe. The creation of this document is based the provider's statements to the medical scribes.    Tobias Stout 4:38 PM, April 3, 2017    Problem list, Medication list, Allergies, and Medical/Social/Surgical histories reviewed in [facility] and  "updated as appropriate.  Labs reviewed in EPIC  OBJECTIVE:     /74  Pulse 80  Temp 98.5  F (36.9  C) (Oral)  Ht 1.511 m (4' 11.5\")  Wt 64.4 kg (142 lb)  SpO2 98%  BMI 28.2 kg/m2  EXAM:  GENERAL APPEARANCE: alert and no distress  EYES: Eyes grossly normal to inspection, PERRL and conjunctivae and sclerae normal  HENT: ear canals and TM's normal, nose and mouth without ulcers or lesions, oropharynx clear and oral mucous membranes moist  NECK: no adenopathy, no asymmetry, masses, or scars and thyroid normal to palpation  RESP: lungs clear to auscultation - no rales, rhonchi or wheezes  BREAST: normal without masses, tenderness or nipple discharge and no palpable axillary masses or adenopathy  CV: regular rate and rhythm, normal S1 S2, no S3 or S4, no murmur, click or rub, no peripheral edema and peripheral pulses strong  ABDOMEN: soft, nontender, no hepatosplenomegaly, no masses and bowel sounds normal   (female): normal female external genitalia, normal urethral meatus, vaginal mucosal atrophy noted, normal cervix, adnexae, and uterus without masses or abnormal discharge, small vaginal enteritis   MS: no musculoskeletal defects are noted and gait is age appropriate without ataxia  SKIN: no suspicious lesions or rashes  NEURO: Normal strength and tone, sensory exam grossly normal, mentation intact and speech normal  PSYCH: mentation appears normal and affect normal/bright    ASSESSMENT/PLAN:     1. Screen for colon cancer  Will complete and return FIT Test.   - Fecal colorectal cancer screen FIT; Future    2. Visit for screening mammogram  Completed today.    3. Screening for malignant neoplasm of cervix  PAP done today.  - Pap imaged thin layer screen with HPV - recommended age 30 - 65 years (select HPV order below)  - HPV High Risk Types DNA Cervical    4. Need for hepatitis C screening test  One time preventative screening.  - Hepatitis C Screen Reflex to HCV RNA Quant and Genotype    5. Need for " prophylactic vaccination with tetanus-diphtheria (TD)  Given today in clinic.   - TDAP VACCINE (ADACEL)    6. Hypothyroidism, unspecified type  Stable. The current medical regimen is effective;  continue present plan and medications.  - TSH WITH FREE T4 REFLEX  - levothyroxine (SYNTHROID/LEVOTHROID) 75 MCG tablet; Take 1 tablet (75 mcg) by mouth daily  Dispense: 90 tablet; Refill: 3    7. Major depressive disorder, recurrent episode, moderate (H)  Stable. The current medical regimen is effective;  continue present plan and medications.  - escitalopram (LEXAPRO) 20 MG tablet; Take 1 tablet (20 mg) by mouth daily  Dispense: 90 tablet; Refill: 1    8. Routine general medical examination at a health care facility  She will contact insurance to check on cost of the Shingles vaccine.    9. Fatigue, unspecified type  Unclear etiology. Checking CBC today.  - CBC with platelets    10. Stress  Family related stressors are the main cause of her mood fluctuations.     11. 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 Discussed going off the med but with her mood fluctuations right now she does not feel this would be a good idea.    12. Anxiety  Stable. The current medical regimen is effective;  continue present plan and medications.  - LORazepam (ATIVAN) 0.5 MG tablet; TAKE 1/2-1 TABLET BY MOUTH EVERY 8 HOURS AS NEEDED FOR ANXIETY. DO NOT OPERATE A VEHICLE AFTER TAKING THIS MEDICATION  Dispense: 30 tablet; Refill: 1    13. Panlobular emphysema (H)  Worsening difficulty breathing. Pt is not using her inhaler regularly.     14. Vitamin D deficiency  Checking labs today.  - Vitamin D Deficiency    15. Coronary artery disease involving native coronary artery of native heart without angina pectoris  Stable. The current medical regimen is effective;  continue present plan and medications.  - metoprolol (TOPROL-XL) 50 MG 24 hr tablet; Take 1 tablet (50 mg) by mouth daily  Dispense: 90 tablet; Refill: 3  - isosorbide mononitrate  "(IMDUR) 30 MG 24 hr tablet; Take 2 tablets (60 mg) by mouth daily  Dispense: 180 tablet; Refill: 1  - nitroglycerin (NITROSTAT) 0.4 MG sublingual tablet; Place 1 tablet (0.4 mg) under the tongue every 5 minutes as needed for chest pain  Dispense: 25 tablet; Refill: 11  - Basic metabolic panel  - ALT    COUNSELING:   Reviewed preventive health counseling, as reflected in patient instructions  BP Screening:   Last 3 BP Readings:    BP Readings from Last 3 Encounters:   04/03/17 136/74   12/07/16 137/76   10/23/16 138/82   The following was recommended to the patient:  Re-screen BP within a year and recommended lifestyle modifications   reports that she quit smoking about 3 years ago. Her smoking use included Cigarettes. She has a 10.50 pack-year smoking history. She has never used smokeless tobacco.  Estimated body mass index is 28.2 kg/(m^2) as calculated from the following:    Height as of this encounter: 1.511 m (4' 11.5\").    Weight as of this encounter: 64.4 kg (142 lb).     Counseling Resources:  ATP IV Guidelines  Pooled Cohorts Equation Calculator  Breast Cancer Risk Calculator  FRAX Risk Assessment  ICSI Preventive Guidelines  Dietary Guidelines for Americans, 2010  USDA's MyPlate  ASA Prophylaxis  Lung CA Screening    Patient Instructions     Complete and return your FIT stool test for colon cancer screening.    Consider having your Shingles vaccine. Call your insurance and see if they will cover it first. If you decide to go ahead with the vaccine you can get it at the pharmacy.    Ask your insurance if they will cover Spiriva.     Preventive Health Recommendations  Female Ages 50 - 64    Yearly exam: See your health care provider every year in order to  o Review health changes.   o Discuss preventive care.    o Review your medicines if your doctor has prescribed any.      Get a Pap test every three years (unless you have an abnormal result and your provider advises testing more often).    If you get Pap " tests with HPV test, you only need to test every 5 years, unless you have an abnormal result.     You do not need a Pap test if your uterus was removed (hysterectomy) and you have not had cancer.    You should be tested each year for STDs (sexually transmitted diseases) if you're at risk.     Have a mammogram every 1 to 2 years.    Have a colonoscopy at age 50, or have a yearly FIT test (stool test). These exams screen for colon cancer.      Have a cholesterol test every 5 years, or more often if advised.    Have a diabetes test (fasting glucose) every three years. If you are at risk for diabetes, you should have this test more often.     If you are at risk for osteoporosis (brittle bone disease), think about having a bone density scan (DEXA).    Shots: Get a flu shot each year. Get a tetanus shot every 10 years.    Nutrition:     Eat at least 5 servings of fruits and vegetables each day.    Eat whole-grain bread, whole-wheat pasta and brown rice instead of white grains and rice.    Talk to your provider about Calcium and Vitamin D.     Lifestyle    Exercise at least 150 minutes a week (30 minutes a day, 5 days a week). This will help you control your weight and prevent disease.    Limit alcohol to one drink per day.    No smoking.     Wear sunscreen to prevent skin cancer.     See your dentist every six months for an exam and cleaning.    See your eye doctor every 1 to 2 years.    Preventive Health Recommendations  Female Ages 50 - 64    Yearly exam: See your health care provider every year in order to  o Review health changes.   o Discuss preventive care.    o Review your medicines if your doctor has prescribed any.      Get a Pap test every three years (unless you have an abnormal result and your provider advises testing more often).    If you get Pap tests with HPV test, you only need to test every 5 years, unless you have an abnormal result.     You do not need a Pap test if your uterus was removed  (hysterectomy) and you have not had cancer.    You should be tested each year for STDs (sexually transmitted diseases) if you're at risk.     Have a mammogram every 1 to 2 years.    Have a colonoscopy at age 50, or have a yearly FIT test (stool test). These exams screen for colon cancer.      Have a cholesterol test every 5 years, or more often if advised.    Have a diabetes test (fasting glucose) every three years. If you are at risk for diabetes, you should have this test more often.     If you are at risk for osteoporosis (brittle bone disease), think about having a bone density scan (DEXA).    Shots: Get a flu shot each year. Get a tetanus shot every 10 years.    Nutrition:     Eat at least 5 servings of fruits and vegetables each day.    Eat whole-grain bread, whole-wheat pasta and brown rice instead of white grains and rice.    Talk to your provider about Calcium and Vitamin D.     Lifestyle    Exercise at least 150 minutes a week (30 minutes a day, 5 days a week). This will help you control your weight and prevent disease.    Limit alcohol to one drink per day.    No smoking.     Wear sunscreen to prevent skin cancer.     See your dentist every six months for an exam and cleaning.    See your eye doctor every 1 to 2 years.    Preventive Health Recommendations  Female Ages 50 - 64    Yearly exam: See your health care provider every year in order to  o Review health changes.   o Discuss preventive care.    o Review your medicines if your doctor has prescribed any.      Get a Pap test every three years (unless you have an abnormal result and your provider advises testing more often).    If you get Pap tests with HPV test, you only need to test every 5 years, unless you have an abnormal result.     You do not need a Pap test if your uterus was removed (hysterectomy) and you have not had cancer.    You should be tested each year for STDs (sexually transmitted diseases) if you're at risk.     Have a mammogram every  1 to 2 years.    Have a colonoscopy at age 50, or have a yearly FIT test (stool test). These exams screen for colon cancer.      Have a cholesterol test every 5 years, or more often if advised.    Have a diabetes test (fasting glucose) every three years. If you are at risk for diabetes, you should have this test more often.     If you are at risk for osteoporosis (brittle bone disease), think about having a bone density scan (DEXA).    Shots: Get a flu shot each year. Get a tetanus shot every 10 years.    Nutrition:     Eat at least 5 servings of fruits and vegetables each day.    Eat whole-grain bread, whole-wheat pasta and brown rice instead of white grains and rice.    Talk to your provider about Calcium and Vitamin D.     Lifestyle    Exercise at least 150 minutes a week (30 minutes a day, 5 days a week). This will help you control your weight and prevent disease.    Limit alcohol to one drink per day.    No smoking.     Wear sunscreen to prevent skin cancer.     See your dentist every six months for an exam and cleaning.    See your eye doctor every 1 to 2 years.      [facility]    If you have any questions regarding to your visit please contact your care team:     Team Pink:   Clinic Hours Telephone Number   Internal Medicine:  Dr. Frannie Edwards, NP       7am-7pm  Monday - Thursday   7am-5pm  Fridays  [phone]  (Appointment scheduling available 24/7)    Questions about your visit?  Team Line  [phone]   Urgent Care - [place] - 11am-9pm Monday-Friday Saturday-Sunday- 9am-5pm   [place] - 5pm-9pm Monday-Friday Saturday-Sunday- 9am-5pm  [phone] - [place]   [phone] - [place]       What options do I have for visits at the clinic other than the traditional office visit?  To expand how we care for you, many of our providers are utilizing electronic visits (e-visits) and telephone visits, when medically  appropriate, for interactions with their patients rather than a visit in the clinic.   We also offer nurse visits for many medical concerns. Just like any other service, we will bill your insurance company for this type of visit based on time spent on the phone with your provider. Not all insurance companies cover these visits. Please check with your medical insurance if this type of visit is covered. You will be responsible for any charges that are not paid by your insurance.      E-visits via [employer]:  generally incur a $35.00 fee.  Telephone visits:  Time spent on the phone: *charged based on time that is spent on the phone in increments of 10 minutes. Estimated cost:   5-10 mins $30.00   11-20 mins. $59.00   21-30 mins. $85.00   Use [employer] (secure email communication and access to your chart) to send your primary care provider a message or make an appointment. Ask someone on your Team how to sign up for [employer].    For a Price Quote for your services, please call our Consumer Price Line at [phone].    As always, Thank you for trusting us with your health care needs!    Discharged By:  TRAN SALINAS        I spent 26 minutes of time with the patient and >50% of it was in education and counseling regarding preventative health.     The information in this document, created by the medical scribe for me, accurately reflects the services I personally performed and the decisions made by me. I have reviewed and approved this document for accuracy prior to leaving the patient care area.  Frannie Allen MD  4:38 PM, 04/03/17    Start 4:41 PM  End 5:07 PM

## 2017-04-03 NOTE — NURSING NOTE
Screening Questionnaire for Adult Immunization    Are you sick today?   No   Do you have allergies to medications, food, a vaccine component or latex?   No   Have you ever had a serious reaction after receiving a vaccination?   No   Do you have a long-term health problem with heart disease, lung disease, asthma, kidney disease, metabolic disease (e.g. diabetes), anemia, or other blood disorder?   No   Do you have cancer, leukemia, HIV/AIDS, or any other immune system problem?   No   In the past 3 months, have you taken medications that affect  your immune system, such as prednisone, other steroids, or anticancer drugs; drugs for the treatment of rheumatoid arthritis, Crohn s disease, or psoriasis; or have you had radiation treatments?   No   Have you had a seizure, or a brain or other nervous system problem?   No   During the past year, have you received a transfusion of blood or blood     products, or been given immune (gamma) globulin or antiviral drug?   No   For women: Are you pregnant or is there a chance you could become        pregnant during the next month?   No   Have you received any vaccinations in the past 4 weeks?   No     Immunization questionnaire answers were all negative.      MNVFC doesn't apply on this patient    Per orders of Dr. Allen, injection of Tdap given by Kaylin Blackman. Patient instructed to remain in clinic for 20 minutes afterwards, and to report any adverse reaction to me immediately.       Screening performed by Kaylin Blackman on 4/3/2017 at 5:08 PM.

## 2017-04-03 NOTE — TELEPHONE ENCOUNTER
Routing refill request to provider for review/approval because:  Labs not current:  TSH    Yecenia Weaver RN - BC

## 2017-04-03 NOTE — LETTER
April 12, 2017    Michela Cole  [address] [place] [address]    Dear Michela,  We are happy to inform you that your PAP smear result from 4/3/17 is normal.  We are now able to do a follow up test on PAP smears. The DNA test is for HPV (Human Papilloma Virus). Cervical cancer is closely linked with certain types of HPV. Your result showed no evidence of high risk HPV.  Therefore we recommend you return in 3 years for your next pap smear.  You will still need to return to the clinic every year for an annual exam and other preventive tests.  Please contact the clinic at [phone] with any questions.  Sincerely,    Frannie Allen MD/octavio

## 2017-04-04 LAB
ALT SERPL W P-5'-P-CCNC: 27 U/L (ref 0–50)
ANION GAP SERPL CALCULATED.3IONS-SCNC: 11 MMOL/L (ref 3–14)
BUN SERPL-MCNC: 19 MG/DL (ref 7–30)
CALCIUM SERPL-MCNC: 8.8 MG/DL (ref 8.5–10.1)
CHLORIDE SERPL-SCNC: 107 MMOL/L (ref 94–109)
CO2 SERPL-SCNC: 25 MMOL/L (ref 20–32)
CREAT SERPL-MCNC: 1.18 MG/DL (ref 0.52–1.04)
DEPRECATED CALCIDIOL+CALCIFEROL SERPL-MC: 26 UG/L (ref 20–75)
GFR SERPL CREATININE-BSD FRML MDRD: 46 ML/MIN/1.7M2
GLUCOSE SERPL-MCNC: 86 MG/DL (ref 70–99)
HCV AB SERPL QL IA: NORMAL
POTASSIUM SERPL-SCNC: 4.2 MMOL/L (ref 3.4–5.3)
SODIUM SERPL-SCNC: 143 MMOL/L (ref 133–144)
TSH SERPL DL<=0.005 MIU/L-ACNC: 3.37 MU/L (ref 0.4–4)

## 2017-04-04 PROCEDURE — 82274 ASSAY TEST FOR BLOOD FECAL: CPT | Performed by: INTERNAL MEDICINE

## 2017-04-04 NOTE — PROGRESS NOTES
No hepatitis C. Vitamin D level is low. Please take 2000 IU daily of Vitamin D.  This is over the counter. Normal thyroid. Normal electrolytes. Normal liver blood test.   Kidneys are mildly impaired.  I would like for her to avoid all NSAIDS and increase water intake.  Come back for a repeat BMP in 2 weeks at the lab.  Indication - renal insufficiency

## 2017-04-05 RX ORDER — CHOLECALCIFEROL (VITAMIN D3) 50 MCG
2000 TABLET ORAL DAILY
COMMUNITY
Start: 2017-04-05 | End: 2017-08-29

## 2017-04-06 LAB
COPATH REPORT: NORMAL
PAP: NORMAL

## 2017-04-08 LAB — HEMOCCULT STL QL IA: NEGATIVE

## 2017-04-10 DIAGNOSIS — Z12.11 SCREEN FOR COLON CANCER: ICD-10-CM

## 2017-04-10 NOTE — LETTER
[place] [id number] [place] [address]    April 10, 2017    Michela Cole  [address] [address]          Dear Mary Abernathy blood in stool    Enclosed is a copy of your results.     Results for orders placed or performed in visit on 04/10/17   Fecal colorectal cancer screen FIT   Result Value Ref Range    Occult Blood Scn FIT Negative NEG       If you have any questions or concerns, please call myself or my nurse at [phone].      Sincerely,        Frannie Allen MD /MONSALVE

## 2017-04-11 LAB
FINAL DIAGNOSIS: NORMAL
HPV HR 12 DNA CVX QL NAA+PROBE: NEGATIVE
HPV16 DNA SPEC QL NAA+PROBE: NEGATIVE
HPV18 DNA SPEC QL NAA+PROBE: NEGATIVE
SPECIMEN DESCRIPTION: NORMAL

## 2017-04-12 PROBLEM — Z12.4 CERVICAL CANCER SCREENING: Status: ACTIVE | Noted: 2017-04-12

## 2017-04-25 DIAGNOSIS — F41.9 ANXIETY: ICD-10-CM

## 2017-04-26 NOTE — TELEPHONE ENCOUNTER
buPROPion (WELLBUTRIN SR) 200 MG 12 hr tablet       Last Written Prescription Date: 1/2/17  Last Fill Quantity: 60; # refills: 2  Last Office Visit with [facility], [facility] or [facility] prescribing provider:  4/3/17        Last PHQ-9 score on record=   PHQ-9 SCORE 1/2/2017   Total Score -   Total Score 4       Lab Results   Component Value Date    AST 20 10/31/2015     Lab Results   Component Value Date    ALT 27 04/03/2017

## 2017-04-27 RX ORDER — BUPROPION HYDROCHLORIDE 200 MG/1
TABLET, EXTENDED RELEASE ORAL
Qty: 60 TABLET | Refills: 2 | Status: SHIPPED | OUTPATIENT
Start: 2017-04-27 | End: 2017-08-01

## 2017-04-27 NOTE — TELEPHONE ENCOUNTER
Prescription approved per [facility] Refill Protocol.  Neeta Guevara RN

## 2017-05-03 ENCOUNTER — TELEPHONE (OUTPATIENT)
Dept: FAMILY MEDICINE | Facility: CLINIC | Age: 67
End: 2017-05-03

## 2017-05-03 DIAGNOSIS — R42 VERTIGO: Primary | ICD-10-CM

## 2017-05-03 NOTE — TELEPHONE ENCOUNTER
Per. Dr. Allen: ok for referral to [facility] [place] [phone]    Please notify    Coral Milner RN

## 2017-05-03 NOTE — TELEPHONE ENCOUNTER
Referral printed and faxed to [facility] at [phone].  Leidy Fuentes,

## 2017-05-03 NOTE — TELEPHONE ENCOUNTER
Reason for Call:  Other     Detailed comments: Patient requesting referral & recommendation to see a specialist for vertigo.      Phone Number Patient can be reached at: Cell number on file:    Telephone Information:   Mobile [phone]       Best Time: anytime    Can we leave a detailed message on this number? YES    Call taken on 5/3/2017 at 9:37 AM by Shanti Souza

## 2017-05-07 ENCOUNTER — TRANSFERRED RECORDS (OUTPATIENT)
Dept: HEALTH INFORMATION MANAGEMENT | Facility: CLINIC | Age: 67
End: 2017-05-07

## 2017-05-12 ENCOUNTER — TRANSFERRED RECORDS (OUTPATIENT)
Dept: HEALTH INFORMATION MANAGEMENT | Facility: CLINIC | Age: 67
End: 2017-05-12

## 2017-05-14 DIAGNOSIS — K21.9 GERD (GASTROESOPHAGEAL REFLUX DISEASE): ICD-10-CM

## 2017-05-16 RX ORDER — PANTOPRAZOLE SODIUM 40 MG/1
TABLET, DELAYED RELEASE ORAL
Qty: 180 TABLET | Refills: 2 | Status: SHIPPED | OUTPATIENT
Start: 2017-05-16 | End: 2018-02-19

## 2017-05-16 RX ORDER — PANTOPRAZOLE SODIUM 40 MG/1
TABLET, DELAYED RELEASE ORAL
Qty: 30 TABLET | Refills: 1
Start: 2017-05-16

## 2017-05-16 NOTE — TELEPHONE ENCOUNTER
pantoprazole (PROTONIX) 40 MG EC tablet (Discontinued)      Last Written Prescription Date: 01/02/17  Last Fill Quantity: 60,  # refills: 2  Last Office Visit with [facility], [facility] or [facility] prescribing provider: 04/03/17      Alona Ortega CMA

## 2017-06-06 ENCOUNTER — ONCOLOGY VISIT (OUTPATIENT)
Dept: ONCOLOGY | Facility: CLINIC | Age: 67
End: 2017-06-06
Attending: OBSTETRICS & GYNECOLOGY
Payer: COMMERCIAL

## 2017-06-06 ENCOUNTER — RADIANT APPOINTMENT (OUTPATIENT)
Dept: ULTRASOUND IMAGING | Facility: CLINIC | Age: 67
End: 2017-06-06
Attending: OBSTETRICS & GYNECOLOGY
Payer: COMMERCIAL

## 2017-06-06 VITALS
RESPIRATION RATE: 16 BRPM | WEIGHT: 131.8 LBS | SYSTOLIC BLOOD PRESSURE: 102 MMHG | HEIGHT: 59 IN | HEART RATE: 75 BPM | TEMPERATURE: 98.3 F | DIASTOLIC BLOOD PRESSURE: 45 MMHG | OXYGEN SATURATION: 97 % | BODY MASS INDEX: 26.57 KG/M2

## 2017-06-06 DIAGNOSIS — N83.209 CYST OF OVARY, UNSPECIFIED LATERALITY: ICD-10-CM

## 2017-06-06 DIAGNOSIS — N83.209 CYST OF OVARY, UNSPECIFIED LATERALITY: Primary | ICD-10-CM

## 2017-06-06 PROCEDURE — 76856 US EXAM PELVIC COMPLETE: CPT | Performed by: RADIOLOGY

## 2017-06-06 PROCEDURE — 99212 OFFICE O/P EST SF 10 MIN: CPT | Mod: ZF

## 2017-06-06 PROCEDURE — 99214 OFFICE O/P EST MOD 30 MIN: CPT | Mod: ZP | Performed by: OBSTETRICS & GYNECOLOGY

## 2017-06-06 PROCEDURE — 76830 TRANSVAGINAL US NON-OB: CPT | Performed by: RADIOLOGY

## 2017-06-06 ASSESSMENT — PAIN SCALES - GENERAL: PAINLEVEL: NO PAIN (0)

## 2017-06-06 NOTE — PROGRESS NOTES
Consult Notes on Referred Patient    Date: 6/6/2017       The patient returns today for follow-up.     Her history is as follows:  Patient originally referred for evaluation of an incidentally noted adnexal mass.  She was admitted to [facility] from 10/22 to 10/28/15 with sepsis from UTI. During this hospitalization, she was apparantly noted to have an adnexal mass. She had a pelvic US on 10/25/15 which showed uterus at 11.9 x 2.7 x 4.4cm, ES 4mm, right ovary and left ovary not seen. Posterior to uterus a large cystic lesion measuring 9.0 x 5.3 x 7.1cm. MRI done for follow-up showed no evidence of nodularity or enhancement. CT CAP on the same day showed an 8cm cyst likely arising from right ovary.  6. Patient referred for further evaluation.     Medical history notable for graves disease, COPD (no oxygen), ITP, Cataracts, CAD(LAD stents). Her last stent was placed in Sept 2014. She had an MI in Dec 2014 which was felt to be secondary to Plavix. . Surgical history notable for tubal ligation, Splenectomy, appendectomy, c/sxn x 4. She went through menopause in her 50s, no HRT, no PMB. She is not sexually active. Lives with boyfriend of 18 yrs. Works as .     12-29-15:  Seen for initial consultation.  Recommended repeat pelvic US and  given length of time from first imaging.     1-4-16:  Pelvic US with uterus at 9.1 x 3.0 x 4.3cm, ES 3mm.  Left ovary 1.9cm, large cyst deep in pelvis measuring 8.7 x 5.4 x 7.6 with debris and possible projections.  No wall thickening.      2-8-16:  Uterus 7.0 x 4.7, ES 2.3mm, left ovary normal, pelvic cyst measuring 5.3 x 7.8 x 9.2cm     5-10-16:  Uterus 8.6 x 5cm, ES 3mm, pelvic cyst 9.5 x 7.5 x 5.1, stable     11-8-16:  Uterus 9.6 x 2.9 x 4.8, ES 4mm.  Right ovary 10 x 5.3 x 7.7.  Stable small left ovarian cyst 1.3cm. Discussed proceeding with surgery versus ongoing observation. Patient elected to proceed with observation.    6/6/17:   US results pending.       Current status:  The patient returns today for follow-up.  Doing well.  Her health has been stable.  Occassional cramping, no other complaints.  Feels that she might be more interested in surgery.         Past Medical History:    Past Medical History:   Diagnosis Date     Blood transfusions 2005    x2     CAD (coronary artery disease) 9/26/2014    Angiogram at [facility] 9/19/14 -  DIAGNOSTIC SUMMARY L Main 10-20% distal narrowing;  LAD stents patent with good flow and minimal irregularities;  proximal circ stent patent with good flow.  OM1 has a 90% prox stenosis - small caliber vessel (as seen on previous angios)  RCA atrial branch is aneurysmal.  PDA occluded and fills via LCA collaterals   Nitro patch gave her headache and blood pressure cynthia     Cataract 12/19/2011     COPD (chronic obstructive pulmonary disease) (H)      Esophagitis, reflux      Graves disease 1978     Hypercholesterolemia      Hypothyroid      Iron deficiency anemia      ITP (idiopathic thrombocytopenic purpura) 1978     Major depression      Migraines 2/4/2010     Restless legs syndrome (RLS)      Vitamin D deficiency          Past Surgical History:    Past Surgical History:   Procedure Laterality Date     APPENDECTOMY       C/SECTION, LOW TRANSVERSE      x 4     SPLENECTOMY  1978    ITP     TUBAL LIGATION      x2         Health Maintenance:  Health Maintenance Due   Topic Date Due     EYE EXAM Q1 YEAR  08/06/2016     ADVANCE DIRECTIVE PLANNING Q5 YRS  09/19/2016     PHQ-9 Q6 MONTHS  06/30/2017       Current Medications:     has a current medication list which includes the following prescription(s): pantoprazole, bupropion, levothyroxine, escitalopram, metoprolol, isosorbide mononitrate, nitroglycerin, lorazepam, diclofenac, albuterol, atorvastatin, meclizine, cyclobenzaprine, order for dme, prasugrel, aspirin ec, ipratropium - albuterol 0.5 mg/2.5 mg/3 ml, and vitamin d.       Allergies:     [unfilled]        Social  "History:     Social History   Substance Use Topics     Smoking status: Former Smoker     Packs/day: 0.25     Years: 42.00     Types: Cigarettes     Quit date: 6/24/2013     Smokeless tobacco: Never Used     Alcohol use 0.0 oz/week     0 Standard drinks or equivalent per week      Comment: a few drinks weekly       History   Drug Use No           Family History:     The patient's family history is notable for :    Family History   Problem Relation Age of Onset     CANCER Mother      lymph nodes, no chemo, cervical cancer     DIABETES Father      Lipids Father      HEART DISEASE Maternal Grandfather      HEART DISEASE Paternal Grandmother      HEART DISEASE Paternal Grandfather      Glaucoma No family hx of      Macular Degeneration No family hx of      Hypertension No family hx of      CEREBROVASCULAR DISEASE No family hx of      Thyroid Disease No family hx of          Physical Exam:     /45  Pulse 75  Temp 98.3  F (36.8  C) (Oral)  Resp 16  Ht 1.511 m (4' 11.49\")  Wt 59.8 kg (131 lb 12.8 oz)  SpO2 97%  BMI 26.19 kg/m2  Body mass index is 26.19 kg/(m^2).    General Appearance: healthy and alert, no distress     Musculoskeletal: extremities non tender and without edema    Skin: no lesions or rashes     Neurological: normal gait, no gross defects     Psychiatric: appropriate mood and affect                               Hematological: normal cervical, supraclavicular and inguinal lymph nodes     Gastrointestinal:       abdomen soft, non-tender, non-distended, no organomegaly or masses    Assessment:  Michela Cole is a 66 year old woman with a new diagnosis of incidental pelvic cyst.      A total of 30 minutes was spent with the patient, 25 minutes of which were spent in counseling the patient and/or treatment planning.        Plan:      1.)        Incidental pelvic cyst:   I have personally reviewed US images but am awaiting formal read.  Cyst remains simple in appearance, but continues to slight " increase in size.  Patient remains asymptomatic. Given the slow but steady increase in size, would recommend consideration of surgery for diagnosis and treatment.  Would attempt laparoscopic removal but discussed possibility of need for conversion to laparotomy in the event of adhesive disease.  Discussed surgical approach, hospital stay, restrictions after surgery.    Will await final report from radiology regarding her US findings.  Patient will be updated with results.  She seems willing to proceed with surgery but would like to defer until after summer as she does a lot of gardening and yard work.    Questions answered, she expressed understanding of plan of care.     Melody Bolivar MD  Gynecologic Oncology  [facility]    CC  Patient Care Team:  Frannie Allen MD as PCP - General (Internal Medicine)  FRANNIE ALLEN

## 2017-06-06 NOTE — PATIENT INSTRUCTIONS
US results pending.    Possible surgery in the Fall.    Melody Bolivar MD  Gynecologic Oncology  [facility]

## 2017-06-06 NOTE — NURSING NOTE
"Oncology Rooming Note    June 6, 2017 11:38 AM   Michela Cole is a 66 year old female who presents for:    Chief Complaint   Patient presents with     Oncology Clinic Visit     Cervical cancer screening, 6 Mo Ck.     Initial Vitals: /45  Pulse 75  Temp 98.3  F (36.8  C) (Oral)  Resp 16  Ht 1.511 m (4' 11.49\")  Wt 59.8 kg (131 lb 12.8 oz)  SpO2 97%  BMI 26.19 kg/m2 Estimated body mass index is 26.19 kg/(m^2) as calculated from the following:    Height as of this encounter: 1.511 m (4' 11.49\").    Weight as of this encounter: 59.8 kg (131 lb 12.8 oz). Body surface area is 1.58 meters squared.  No Pain (0) Comment: Data Unavailable   No LMP recorded. Patient is postmenopausal.  Allergies reviewed: Yes  Medications reviewed: Yes    Medications: Medication refills not needed today.  Pharmacy name entered into [facility]:    [facility] - [place], [place] - [address] [facility] [id number] - [place], [place] - [address] [facility], [place] - [address]    Clinical concerns: None. Dr Bolivar was NOT notified.    7 minutes for nursing intake (face to face time)     Sheree Alston LPN              "

## 2017-06-06 NOTE — MR AVS SNAPSHOT
"              After Visit Summary   2017    Michela Cole    MRN: [id number]           Patient Information     Date Of Birth          1950        Visit Information        Provider Department      2017 11:30 AM Melody Bolivar MD [facility]        Today's Diagnoses     Cyst of ovary, unspecified laterality    -  1      Care Instructions    US results pending.    Possible surgery in the Fall.    Melody Bolivar MD  Gynecologic Oncology  [facility]            Follow-ups after your visit        Who to contact     If you have questions or need follow up information about today's clinic visit or your schedule please contact [facility] directly at [phone].  Normal or non-critical lab and imaging results will be communicated to you by MyChart, letter or phone within 4 business days after the clinic has received the results. If you do not hear from us within 7 days, please contact the clinic through MyChart or phone. If you have a critical or abnormal lab result, we will notify you by phone as soon as possible.  Submit refill requests through [employer] or call your pharmacy and they will forward the refill request to us. Please allow 3 business days for your refill to be completed.          Additional Information About Your Visit        MyChart Information     [employer] lets you send messages to your doctor, view your test results, renew your prescriptions, schedule appointments and more. To sign up, go to [email or web address] . Click on \"Log in\" on the left side of the screen, which will take you to the Welcome page. Then click on \"Sign up Now\" on the right side of the page.     You will be asked to enter the access code listed below, as well as some personal information. Please follow the directions to create your username and password.     Your access code is: [email or web address]  Expires: 2017  5:08 PM     Your access code will  in " "90 days. If you need help or a new code, please call your [place] clinic or [phone].        Care EveryWhere ID     This is your Care EveryWhere ID. This could be used by other organizations to access your [place] medical records  [id number]        Your Vitals Were     Pulse Temperature Respirations Height Pulse Oximetry BMI (Body Mass Index)    75 98.3  F (36.8  C) (Oral) 16 1.511 m (4' 11.49\") 97% 26.19 kg/m2       Blood Pressure from Last 3 Encounters:   06/06/17 102/45   04/03/17 136/74   12/07/16 137/76    Weight from Last 3 Encounters:   06/06/17 59.8 kg (131 lb 12.8 oz)   04/03/17 64.4 kg (142 lb)   12/07/16 64 kg (141 lb)              Today, you had the following     No orders found for display         Today's Medication Changes          These changes are accurate as of: 6/6/17 12:38 PM.  If you have any questions, ask your nurse or doctor.               These medicines have changed or have updated prescriptions.        Dose/Directions    diclofenac 1 % Gel topical gel   Commonly known as:  VOLTAREN   This may have changed:    - how to take this  - when to take this  - reasons to take this  - additional instructions   Used for:  Primary osteoarthritis of both hands        Apply  2 grams to hands four times daily using enclosed dosing card.   Quantity:  100 g   Refills:  0                Primary Care Provider Office Phone # Fax #    Frannie Sharon Allen [phone]       [address] [address]        Goals        General    Functional (pt-stated)     Notes - Note created  9/1/2015 11:20 AM by Janneth Orellana, RN    I will rinse my mouth out after using my inhaler to help keep the lining of my mouth healthy  As of today's date 9/1/2015 goal is met at 0 - 25%.   Goal Status:  Ongoing      Medication (pt-stated)     Notes - Note edited  9/1/2015  2:10 PM by Janneth Orellana, RN    I will use my inhalers as instructed  As of today's date 9/1/2015 " goal is met at 0 - 25%.   Goal Status:  Activeedule  Appointment with ENT       Psychosocial (pt-stated)     Notes - Note edited  9/1/2015  2:10 PM by Janneth Orellana, RN    I will  Investigate counseling and/or support groups if and when I am ready to get formal support  As of today's date 9/1/2015 goal is met at 0 - 25%.   Goal Status:  Ongoing        Thank you!     Thank you for choosing [facility]  for your care. Our goal is always to provide you with excellent care. Hearing back from our patients is one way we can continue to improve our services. Please take a few minutes to complete the written survey that you may receive in the mail after your visit with us. Thank you!             Your Updated Medication List - Protect others around you: Learn how to safely use, store and throw away your medicines at www.disposemymeds.org.          This list is accurate as of: 6/6/17 12:38 PM.  Always use your most recent med list.                   Brand Name Dispense Instructions for use    albuterol 108 (90 BASE) MCG/ACT Inhaler    PROAIR HFA/PROVENTIL HFA/VENTOLIN HFA    2 Inhaler    Inhale 2 puffs into the lungs every 6 hours as needed for shortness of breath / dyspnea       aspirin EC 81 MG EC tablet      Take 81 mg by mouth daily       atorvastatin 40 MG tablet    LIPITOR     Take 40 mg by mouth daily       buPROPion 200 MG 12 hr tablet    WELLBUTRIN SR    60 tablet    TAKE ONE TABLET BY MOUTH TWICE DAILY       cyclobenzaprine 5 MG tablet    FLEXERIL    60 tablet    Take 1 tablet (5 mg) by mouth 3 times daily as needed for muscle spasms       diclofenac 1 % Gel topical gel    VOLTAREN    100 g    Apply  2 grams to hands four times daily using enclosed dosing card.       EFFIENT 10 MG Tabs tablet   Generic drug:  prasugrel     30 tablet    Take by mouth daily       escitalopram 20 MG tablet    LEXAPRO    90 tablet    Take 1 tablet (20 mg) by mouth daily       ipratropium - albuterol 0.5 mg/2.5 mg/3  mL 0.5-2.5 (3) MG/3ML neb solution    DUONEB    30 vial    Take 3 mLs by nebulization every 6 hours as needed for shortness of breath / dyspnea       isosorbide mononitrate 30 MG 24 hr tablet    IMDUR    180 tablet    Take 2 tablets (60 mg) by mouth daily       levothyroxine 75 MCG tablet    SYNTHROID/LEVOTHROID    90 tablet    Take 1 tablet (75 mcg) by mouth daily       LORazepam 0.5 MG tablet    ATIVAN    30 tablet    TAKE 1/2-1 TABLET BY MOUTH EVERY 8 HOURS AS NEEDED FOR ANXIETY. DO NOT OPERATE A VEHICLE AFTER TAKING THIS MEDICATION       meclizine 25 MG tablet    ANTIVERT    30 tablet    Take 1 tablet (25 mg) by mouth every 6 hours as needed for dizziness       metoprolol 50 MG 24 hr tablet    TOPROL-XL    90 tablet    Take 1 tablet (50 mg) by mouth daily       nitroglycerin 0.4 MG sublingual tablet    NITROSTAT    25 tablet    Place 1 tablet (0.4 mg) under the tongue every 5 minutes as needed for chest pain       order for DME     1 Device    Equipment being ordered: incentive spirometry       pantoprazole 40 MG EC tablet    PROTONIX    180 tablet    TAKE 1 TABLET BY MOUTH 2 TIMES DAILY. TAKE BY MOUTH 30-60 MINUTES BEFORE A MEAL       vitamin D 2000 UNITS tablet      Take 2,000 Units by mouth daily

## 2017-06-06 NOTE — LETTER
6/6/2017       RE: Michela Cole  [address] [facility]  [place] [address]     Dear Colleague,    Thank you for referring your patient, Michela Cole, to the [facility]. Please see a copy of my visit note below.                            Consult Notes on Referred Patient    Date: 6/6/2017       The patient returns today for follow-up.     Her history is as follows:  Patient originally referred for evaluation of an incidentally noted adnexal mass.  She was admitted to [facility] from 10/22 to 10/28/15 with sepsis from UTI. During this hospitalization, she was apparantly noted to have an adnexal mass. She had a pelvic US on 10/25/15 which showed uterus at 11.9 x 2.7 x 4.4cm, ES 4mm, right ovary and left ovary not seen. Posterior to uterus a large cystic lesion measuring 9.0 x 5.3 x 7.1cm. MRI done for follow-up showed no evidence of nodularity or enhancement. CT CAP on the same day showed an 8cm cyst likely arising from right ovary.  6. Patient referred for further evaluation.     Medical history notable for graves disease, COPD (no oxygen), ITP, Cataracts, CAD(LAD stents). Her last stent was placed in Sept 2014. She had an MI in Dec 2014 which was felt to be secondary to Plavix. . Surgical history notable for tubal ligation, Splenectomy, appendectomy, c/sxn x 4. She went through menopause in her 50s, no HRT, no PMB. She is not sexually active. Lives with boyfriend of 18 yrs. Works as .     12-29-15:  Seen for initial consultation.  Recommended repeat pelvic US and  given length of time from first imaging.     1-4-16:  Pelvic US with uterus at 9.1 x 3.0 x 4.3cm, ES 3mm.  Left ovary 1.9cm, large cyst deep in pelvis measuring 8.7 x 5.4 x 7.6 with debris and possible projections.  No wall thickening.      2-8-16:  Uterus 7.0 x 4.7, ES 2.3mm, left ovary normal, pelvic cyst measuring 5.3 x 7.8 x 9.2cm     5-10-16:  Uterus 8.6 x 5cm, ES 3mm, pelvic cyst 9.5 x  7.5 x 5.1, stable     11-8-16:  Uterus 9.6 x 2.9 x 4.8, ES 4mm.  Right ovary 10 x 5.3 x 7.7.  Stable small left ovarian cyst 1.3cm. Discussed proceeding with surgery versus ongoing observation. Patient elected to proceed with observation.    6/6/17:  US results pending.       Current status:  The patient returns today for follow-up.  Doing well.  Her health has been stable.  Occassional cramping, no other complaints.  Feels that she might be more interested in surgery.         Past Medical History:    Past Medical History:   Diagnosis Date     Blood transfusions 2005    x2     CAD (coronary artery disease) 9/26/2014    Angiogram at [facility] 9/19/14 -  DIAGNOSTIC SUMMARY L Main 10-20% distal narrowing;  LAD stents patent with good flow and minimal irregularities;  proximal circ stent patent with good flow.  OM1 has a 90% prox stenosis - small caliber vessel (as seen on previous angios)  RCA atrial branch is aneurysmal.  PDA occluded and fills via LCA collaterals   Nitro patch gave her headache and blood pressure cynthia     Cataract 12/19/2011     COPD (chronic obstructive pulmonary disease) (H)      Esophagitis, reflux      Graves disease 1978     Hypercholesterolemia      Hypothyroid      Iron deficiency anemia      ITP (idiopathic thrombocytopenic purpura) 1978     Major depression      Migraines 2/4/2010     Restless legs syndrome (RLS)      Vitamin D deficiency          Past Surgical History:    Past Surgical History:   Procedure Laterality Date     APPENDECTOMY       C/SECTION, LOW TRANSVERSE      x 4     SPLENECTOMY  1978    ITP     TUBAL LIGATION      x2         Health Maintenance:  Health Maintenance Due   Topic Date Due     EYE EXAM Q1 YEAR  08/06/2016     ADVANCE DIRECTIVE PLANNING Q5 YRS  09/19/2016     PHQ-9 Q6 MONTHS  06/30/2017       Current Medications:     has a current medication list which includes the following prescription(s): pantoprazole, bupropion, levothyroxine, escitalopram, metoprolol, isosorbide  "mononitrate, nitroglycerin, lorazepam, diclofenac, albuterol, atorvastatin, meclizine, cyclobenzaprine, order for dme, prasugrel, aspirin ec, ipratropium - albuterol 0.5 mg/2.5 mg/3 ml, and vitamin d.       Allergies:     [unfilled]        Social History:     Social History   Substance Use Topics     Smoking status: Former Smoker     Packs/day: 0.25     Years: 42.00     Types: Cigarettes     Quit date: 6/24/2013     Smokeless tobacco: Never Used     Alcohol use 0.0 oz/week     0 Standard drinks or equivalent per week      Comment: a few drinks weekly       History   Drug Use No           Family History:     The patient's family history is notable for :    Family History   Problem Relation Age of Onset     CANCER Mother      lymph nodes, no chemo, cervical cancer     DIABETES Father      Lipids Father      HEART DISEASE Maternal Grandfather      HEART DISEASE Paternal Grandmother      HEART DISEASE Paternal Grandfather      Glaucoma No family hx of      Macular Degeneration No family hx of      Hypertension No family hx of      CEREBROVASCULAR DISEASE No family hx of      Thyroid Disease No family hx of          Physical Exam:     /45  Pulse 75  Temp 98.3  F (36.8  C) (Oral)  Resp 16  Ht 1.511 m (4' 11.49\")  Wt 59.8 kg (131 lb 12.8 oz)  SpO2 97%  BMI 26.19 kg/m2  Body mass index is 26.19 kg/(m^2).    General Appearance: healthy and alert, no distress     Musculoskeletal: extremities non tender and without edema    Skin: no lesions or rashes     Neurological: normal gait, no gross defects     Psychiatric: appropriate mood and affect                               Hematological: normal cervical, supraclavicular and inguinal lymph nodes     Gastrointestinal:       abdomen soft, non-tender, non-distended, no organomegaly or masses    Assessment:  Michela Cole is a 66 year old woman with a new diagnosis of incidental pelvic cyst.      A total of 30 minutes was spent with the patient, 25 minutes of " which were spent in counseling the patient and/or treatment planning.        Plan:      1.)        Incidental pelvic cyst:   I have personally reviewed US images but am awaiting formal read.  Cyst remains simple in appearance, but continues to slight increase in size.  Patient remains asymptomatic. Given the slow but steady increase in size, would recommend consideration of surgery for diagnosis and treatment.  Would attempt laparoscopic removal but discussed possibility of need for conversion to laparotomy in the event of adhesive disease.  Discussed surgical approach, hospital stay, restrictions after surgery.    Will await final report from radiology regarding her US findings.  Patient will be updated with results.  She seems willing to proceed with surgery but would like to defer until after summer as she does a lot of gardening and yard work.    Questions answered, she expressed understanding of plan of care.     Melody Bolivar MD  Gynecologic Oncology  [facility]    CC  Patient Care Team:  Frannie Allen MD as PCP - General (Internal Medicine)  FRANNIE ALLEN

## 2017-06-12 ENCOUNTER — TELEPHONE (OUTPATIENT)
Dept: ONCOLOGY | Facility: CLINIC | Age: 67
End: 2017-06-12

## 2017-06-12 NOTE — TELEPHONE ENCOUNTER
Patient called with US result and Dr. Bolivar's recommendations. Patient would like to move forward with surgery in September.     All needed appts will be arranged.     Umu Mitchell RN

## 2017-06-14 ENCOUNTER — HOSPITAL ENCOUNTER (OUTPATIENT)
Facility: CLINIC | Age: 67
End: 2017-06-14
Attending: OBSTETRICS & GYNECOLOGY | Admitting: OBSTETRICS & GYNECOLOGY

## 2017-06-14 DIAGNOSIS — N83.209 CYST OF OVARY, UNSPECIFIED LATERALITY: Primary | ICD-10-CM

## 2017-06-14 NOTE — Clinical Note
Orders entered for OR on 9-11.  Please schedule PAC visit, return visit with me for consenting.  Thanks. EVAN

## 2017-06-14 NOTE — PROGRESS NOTES
Orders placed for OR on 9-11-17.    Procedure: Laparoscopic removal of both fallopian tubes and both ovaries.  Possible cancer staging and tumor debulking with hysterectomy, lymphadenectomy, laparotomy.    Indication:  Bilateral ovarian cysts    Patient will need PAC, consenting    Melody Bolivar MD  Gynecologic Oncology  [facility]

## 2017-08-01 DIAGNOSIS — F41.9 ANXIETY: ICD-10-CM

## 2017-08-01 NOTE — TELEPHONE ENCOUNTER
buPROPion (WELLBUTRIN SR) 200 MG 12 hr tablet       Last Written Prescription Date: 4/27/17  Last Fill Quantity: 60; # refills: 2  Last Office Visit with [facility], [facility] or [facility] prescribing provider:  4/3/17        Last PHQ-9 score on record=   PHQ-9 SCORE 1/2/2017   Total Score -   Total Score 4       Lab Results   Component Value Date    AST 20 10/31/2015     Lab Results   Component Value Date    ALT 27 04/03/2017

## 2017-08-02 NOTE — TELEPHONE ENCOUNTER
Called patient and left VM to call clinic. Team number left.  Umu CAMPOVERDE CMA ([facility])

## 2017-08-04 RX ORDER — BUPROPION HYDROCHLORIDE 200 MG/1
200 TABLET, EXTENDED RELEASE ORAL 2 TIMES DAILY
Qty: 60 TABLET | Refills: 0 | Status: SHIPPED | OUTPATIENT
Start: 2017-08-04 | End: 2017-08-31

## 2017-08-04 ASSESSMENT — ANXIETY QUESTIONNAIRES
3. WORRYING TOO MUCH ABOUT DIFFERENT THINGS: NEARLY EVERY DAY
7. FEELING AFRAID AS IF SOMETHING AWFUL MIGHT HAPPEN: MORE THAN HALF THE DAYS
2. NOT BEING ABLE TO STOP OR CONTROL WORRYING: NEARLY EVERY DAY
GAD7 TOTAL SCORE: 14
IF YOU CHECKED OFF ANY PROBLEMS ON THIS QUESTIONNAIRE, HOW DIFFICULT HAVE THESE PROBLEMS MADE IT FOR YOU TO DO YOUR WORK, TAKE CARE OF THINGS AT HOME, OR GET ALONG WITH OTHER PEOPLE: SOMEWHAT DIFFICULT
6. BECOMING EASILY ANNOYED OR IRRITABLE: NOT AT ALL
1. FEELING NERVOUS, ANXIOUS, OR ON EDGE: SEVERAL DAYS
5. BEING SO RESTLESS THAT IT IS HARD TO SIT STILL: MORE THAN HALF THE DAYS

## 2017-08-04 ASSESSMENT — PATIENT HEALTH QUESTIONNAIRE - PHQ9
SUM OF ALL RESPONSES TO PHQ QUESTIONS 1-9: 7
5. POOR APPETITE OR OVEREATING: NEARLY EVERY DAY

## 2017-08-04 NOTE — TELEPHONE ENCOUNTER
Prescription approved per [facility] Refill Protocol.  Patient due for office visit for further refills.      Yecenia Weaver RN - BC

## 2017-08-05 ASSESSMENT — ANXIETY QUESTIONNAIRES: GAD7 TOTAL SCORE: 14

## 2017-08-28 ENCOUNTER — ANESTHESIA EVENT (OUTPATIENT)
Dept: SURGERY | Facility: CLINIC | Age: 67
End: 2017-08-28

## 2017-08-28 PROBLEM — Z12.4 CERVICAL CANCER SCREENING: Status: RESOLVED | Noted: 2017-04-12 | Resolved: 2017-08-28

## 2017-08-29 ENCOUNTER — ONCOLOGY VISIT (OUTPATIENT)
Dept: ONCOLOGY | Facility: CLINIC | Age: 67
End: 2017-08-29
Attending: OBSTETRICS & GYNECOLOGY
Payer: COMMERCIAL

## 2017-08-29 ENCOUNTER — OFFICE VISIT (OUTPATIENT)
Dept: SURGERY | Facility: CLINIC | Age: 67
End: 2017-08-29

## 2017-08-29 ENCOUNTER — ALLIED HEALTH/NURSE VISIT (OUTPATIENT)
Dept: SURGERY | Facility: CLINIC | Age: 67
End: 2017-08-29

## 2017-08-29 VITALS
HEIGHT: 60 IN | BODY MASS INDEX: 24.81 KG/M2 | DIASTOLIC BLOOD PRESSURE: 73 MMHG | SYSTOLIC BLOOD PRESSURE: 112 MMHG | RESPIRATION RATE: 17 BRPM | TEMPERATURE: 98 F | OXYGEN SATURATION: 96 % | WEIGHT: 126.4 LBS | HEART RATE: 74 BPM

## 2017-08-29 VITALS
HEIGHT: 60 IN | OXYGEN SATURATION: 96 % | BODY MASS INDEX: 24.8 KG/M2 | SYSTOLIC BLOOD PRESSURE: 112 MMHG | HEART RATE: 74 BPM | RESPIRATION RATE: 17 BRPM | DIASTOLIC BLOOD PRESSURE: 73 MMHG | WEIGHT: 126.32 LBS | TEMPERATURE: 98 F

## 2017-08-29 DIAGNOSIS — Z01.818 PREOP EXAMINATION: Primary | ICD-10-CM

## 2017-08-29 DIAGNOSIS — N83.209 OVARIAN CYST: Primary | ICD-10-CM

## 2017-08-29 DIAGNOSIS — F33.1 MAJOR DEPRESSIVE DISORDER, RECURRENT EPISODE, MODERATE (H): ICD-10-CM

## 2017-08-29 DIAGNOSIS — F41.9 ANXIETY: ICD-10-CM

## 2017-08-29 LAB
ABO + RH BLD: NORMAL
ABO + RH BLD: NORMAL
ALBUMIN SERPL-MCNC: 4 G/DL (ref 3.4–5)
ALP SERPL-CCNC: 81 U/L (ref 40–150)
ALT SERPL W P-5'-P-CCNC: 25 U/L (ref 0–50)
ANION GAP SERPL CALCULATED.3IONS-SCNC: 5 MMOL/L (ref 3–14)
AST SERPL W P-5'-P-CCNC: 20 U/L (ref 0–45)
BASOPHILS # BLD AUTO: 0 10E9/L (ref 0–0.2)
BASOPHILS NFR BLD AUTO: 0.5 %
BILIRUB SERPL-MCNC: 1.6 MG/DL (ref 0.2–1.3)
BLD GP AB SCN SERPL QL: NORMAL
BLOOD BANK CMNT PATIENT-IMP: NORMAL
BLOOD BANK CMNT PATIENT-IMP: NORMAL
BUN SERPL-MCNC: 18 MG/DL (ref 7–30)
CALCIUM SERPL-MCNC: 9.4 MG/DL (ref 8.5–10.1)
CANCER AG125 SERPL-ACNC: 16 U/ML (ref 0–30)
CHLORIDE SERPL-SCNC: 104 MMOL/L (ref 94–109)
CO2 SERPL-SCNC: 30 MMOL/L (ref 20–32)
CREAT SERPL-MCNC: 0.98 MG/DL (ref 0.52–1.04)
DIFFERENTIAL METHOD BLD: NORMAL
EOSINOPHIL # BLD AUTO: 0.2 10E9/L (ref 0–0.7)
EOSINOPHIL NFR BLD AUTO: 2.4 %
ERYTHROCYTE [DISTWIDTH] IN BLOOD BY AUTOMATED COUNT: 13.4 % (ref 10–15)
GFR SERPL CREATININE-BSD FRML MDRD: 57 ML/MIN/1.7M2
GLUCOSE SERPL-MCNC: 106 MG/DL (ref 70–99)
HCT VFR BLD AUTO: 39.3 % (ref 35–47)
HGB BLD-MCNC: 13.1 G/DL (ref 11.7–15.7)
IMM GRANULOCYTES # BLD: 0 10E9/L (ref 0–0.4)
IMM GRANULOCYTES NFR BLD: 0.5 %
LYMPHOCYTES # BLD AUTO: 2.3 10E9/L (ref 0.8–5.3)
LYMPHOCYTES NFR BLD AUTO: 36.5 %
MCH RBC QN AUTO: 31.4 PG (ref 26.5–33)
MCHC RBC AUTO-ENTMCNC: 33.3 G/DL (ref 31.5–36.5)
MCV RBC AUTO: 94 FL (ref 78–100)
MONOCYTES # BLD AUTO: 0.6 10E9/L (ref 0–1.3)
MONOCYTES NFR BLD AUTO: 10.3 %
NEUTROPHILS # BLD AUTO: 3.1 10E9/L (ref 1.6–8.3)
NEUTROPHILS NFR BLD AUTO: 49.8 %
NRBC # BLD AUTO: 0 10*3/UL
NRBC BLD AUTO-RTO: 0 /100
NT-PROBNP SERPL-MCNC: 165 PG/ML (ref 0–125)
PLATELET # BLD AUTO: 321 10E9/L (ref 150–450)
POTASSIUM SERPL-SCNC: 4.9 MMOL/L (ref 3.4–5.3)
PROT SERPL-MCNC: 7.8 G/DL (ref 6.8–8.8)
RBC # BLD AUTO: 4.17 10E12/L (ref 3.8–5.2)
SODIUM SERPL-SCNC: 139 MMOL/L (ref 133–144)
SPECIMEN EXP DATE BLD: NORMAL
WBC # BLD AUTO: 6.2 10E9/L (ref 4–11)

## 2017-08-29 PROCEDURE — 86850 RBC ANTIBODY SCREEN: CPT | Performed by: OBSTETRICS & GYNECOLOGY

## 2017-08-29 PROCEDURE — 99214 OFFICE O/P EST MOD 30 MIN: CPT | Mod: ZP | Performed by: OBSTETRICS & GYNECOLOGY

## 2017-08-29 PROCEDURE — 80053 COMPREHEN METABOLIC PANEL: CPT | Performed by: OBSTETRICS & GYNECOLOGY

## 2017-08-29 PROCEDURE — 36415 COLL VENOUS BLD VENIPUNCTURE: CPT

## 2017-08-29 PROCEDURE — 85025 COMPLETE CBC W/AUTO DIFF WBC: CPT | Performed by: OBSTETRICS & GYNECOLOGY

## 2017-08-29 PROCEDURE — 86901 BLOOD TYPING SEROLOGIC RH(D): CPT | Performed by: OBSTETRICS & GYNECOLOGY

## 2017-08-29 PROCEDURE — 86900 BLOOD TYPING SEROLOGIC ABO: CPT | Performed by: OBSTETRICS & GYNECOLOGY

## 2017-08-29 PROCEDURE — 99212 OFFICE O/P EST SF 10 MIN: CPT | Mod: ZF

## 2017-08-29 PROCEDURE — 86304 IMMUNOASSAY TUMOR CA 125: CPT | Performed by: OBSTETRICS & GYNECOLOGY

## 2017-08-29 RX ORDER — LORAZEPAM 0.5 MG/1
TABLET ORAL
Qty: 1 TABLET | Refills: 0 | COMMUNITY
Start: 2017-08-29 | End: 2019-10-21

## 2017-08-29 RX ORDER — ESCITALOPRAM OXALATE 20 MG/1
20 TABLET ORAL DAILY
Qty: 1 TABLET | Refills: 0 | COMMUNITY
Start: 2017-08-29 | End: 2017-08-29

## 2017-08-29 RX ORDER — IPRATROPIUM BROMIDE AND ALBUTEROL SULFATE 2.5; .5 MG/3ML; MG/3ML
3 SOLUTION RESPIRATORY (INHALATION) ONCE
Status: CANCELLED | OUTPATIENT
Start: 2017-08-29 | End: 2017-08-29

## 2017-08-29 ASSESSMENT — ENCOUNTER SYMPTOMS: ORTHOPNEA: 0

## 2017-08-29 ASSESSMENT — LIFESTYLE VARIABLES: TOBACCO_USE: 1

## 2017-08-29 ASSESSMENT — COPD QUESTIONNAIRES
COPD: 1
CAT_SEVERITY: MODERATE

## 2017-08-29 ASSESSMENT — PAIN SCALES - GENERAL: PAINLEVEL: NO PAIN (0)

## 2017-08-29 NOTE — NURSING NOTE
Pre Op Nurse Teaching Template    Relevant Diagnosis: Bilateral Ovarian cyst     Teaching Topic: Laparoscopic removal of both fallopian tubes and both ovaries. Possible cancer staging and tumor debulking with hysterectomy, possible lymph dissection, laparotomy     Person(s) involved in teaching :  Patient  Alone     Motivation Level:  Asks Questions:    Yes      Eager to Learn:     Yes     Cooperative:          Yes    Receptive (willing. Able to accept information):    Yes      Patient and those who are listed above demonstrates understanding of the following:   Reason for the appointment, diagnosis and treatment plan:   Yes   Knowledge of proper use of medications and conditions for which they are ordered (with special attention to potential side effects or drug interactions): Yes   Which situations necessitate calling provider and whom to contact: Yes         Proper use and care of (medical equip, care aids, etc.):   N/A  Nutritional needs and diet plan:  Yes      Pain management techniques:     Yes, Pain Scale   Patient instructed on hand hygiene:  N/A  How and/when to access community resources:  Yes    Diet:   Yes, [facility] Diet Instructions      Teaching Concerns addressed: Yes      Infection Prevention:  Patient and those who are listed above demonstrates understanding of the following:  Surgical procedure site care taught:   Yes   Signs and symptoms of infection taught: Yes       Instructional Materials Used/Given:  [facility] [place] Before Your Surgery Booklet  [facility] Skin Prep  Hysterectomy Guidelines  Home Care after Major Abdominal or Vaginal Surgery  Map  Accommodations Brochure  Phone numbers for [facility] and [facility] Given to Patient      Comments:  NA       Time spent teaching with patient:  20 minutes  Umu Mitchell RN

## 2017-08-29 NOTE — NURSING NOTE
"Oncology Rooming Note    August 29, 2017 10:47 AM   Michela Cole is a 66 year old female who presents for:    Chief Complaint   Patient presents with     Oncology Clinic Visit     return patient visit for consent related to  Cyst of ovary, unspecified laterality      Initial Vitals: /73  Pulse 74  Temp 98  F (36.7  C) (Oral)  Resp 17  Ht 1.521 m (4' 11.9\")  Wt 57.3 kg (126 lb 6.4 oz)  SpO2 96%  BMI 24.77 kg/m2 Estimated body mass index is 24.77 kg/(m^2) as calculated from the following:    Height as of this encounter: 1.521 m (4' 11.9\").    Weight as of this encounter: 57.3 kg (126 lb 6.4 oz). Body surface area is 1.56 meters squared.  No Pain (0) Comment: Data Unavailable   No LMP recorded. Patient is postmenopausal.  Allergies reviewed: Yes  Medications reviewed: Yes    Medications: Medication refills not needed today.  Pharmacy name entered into Psychiatric:    [id number] - [place], [place] - [address] [id number] - [place], [place] - [address] [facility], [place] - [address]    Clinical concerns: no concerns dr. inman was notified.    5 minutes for nursing intake (face to face time)     Sergio Lawler CMA              "

## 2017-08-29 NOTE — H&P
Pre-Operative H & P     Date of Encounter: 8/29/2017  Primary Care Physician:  Frannie Allen    CC: Pelvic cyst that is increasing in size.    HPI:  Michela Cole is a 66 year old female who presents for pre-operative H & P in preparation for Laparoscopic Bilateral Salpingo-Oophorectomy, Possible Cancer Staging and Tumor Debulking with Hysterectomy, Lymphadenectomy, Possible Open on 9/11/17 with Dr. Melody Bolivar at [facility].     The patient was first evaluated by Dr. Bolivar on 12/29/15 in regards to an incidentally noted adnexal mass.  Tehe patient has continued to be followed with serial imaging, and on most recent ultrasound, it was noted that while the pelvic cyst remains simple in appearance, and the patient remains asymptomatic, the cyst continues to increase in size.  Dr. Bolivar recommends surgical removal for diagnosis and treatment.  Arrangements are now being made for the above procedures.  History is obtained from the patient and the medical record.    Past Medical History:  Past Medical History:   Diagnosis Date     CAD (coronary artery disease) 09/26/2014    Treated with multiple PCI     COPD (chronic obstructive pulmonary disease) (H)      Family history of sudden cardiac death (SCD)      GERD (gastroesophageal reflux disease) 2/4/2010     H/O idiopathic thrombocytopenic purpura 1978     H/O migraine 02/04/2010     History of blood transfusion 2005    x 2     History of Graves' disease 1978     Hyperlipidemia LDL goal <130 9/23/2009     Hypothyroidism 9/23/2009     Iron deficiency anemia      Major depression      PONV (postoperative nausea and vomiting)      Past Surgical History:  Past Surgical History:   Procedure Laterality Date     APPENDECTOMY       C/SECTION, LOW TRANSVERSE      x 4     CORONARY ANGIOGRAPHY ADULT ORDER      Total of 5 coronary angiograms     SPLENECTOMY  1978    ITP     TUBAL LIGATION      x2     Hx of Blood  transfusions/reactions: History of transfusions, no known reactions.     Hx of abnormal bleeding or anti-platelet use: ASA and Effient will need to be held in preparation for surgery.  Will contact the patient's Cardiologist regarding this.      Menstrual history: No LMP recorded. Patient is postmenopausal.    Steroid use in the last year: Denies.    Personal or FH of difficulty with anesthesia: PONV.    Prior to admission medications  Current Outpatient Prescriptions   Medication Sig Dispense Refill     LORazepam (ATIVAN) 0.5 MG tablet TAKE 1/2-1 TABLET BY MOUTH EVERY 8 HOURS AS NEEDED FOR ANXIETY. DO NOT OPERATE A VEHICLE AFTER TAKING THIS MEDICATION 1 tablet 0     buPROPion (WELLBUTRIN SR) 200 MG 12 hr tablet Take 1 tablet (200 mg) by mouth 2 times daily 60 tablet 0     pantoprazole (PROTONIX) 40 MG EC tablet TAKE 1 TABLET BY MOUTH 2 TIMES DAILY. TAKE BY MOUTH 30-60 MINUTES BEFORE A MEAL 180 tablet 2     levothyroxine (SYNTHROID/LEVOTHROID) 75 MCG tablet Take 1 tablet (75 mcg) by mouth daily (Patient taking differently: Take 75 mcg by mouth every morning ) 90 tablet 3     metoprolol (TOPROL-XL) 50 MG 24 hr tablet Take 1 tablet (50 mg) by mouth daily (Patient taking differently: Take 50 mg by mouth every morning ) 90 tablet 3     isosorbide mononitrate (IMDUR) 30 MG 24 hr tablet Take 2 tablets (60 mg) by mouth daily (Patient taking differently: Take 60 mg by mouth every morning ) 180 tablet 1     albuterol (PROAIR HFA/PROVENTIL HFA/VENTOLIN HFA) 108 (90 BASE) MCG/ACT Inhaler Inhale 2 puffs into the lungs every 6 hours as needed for shortness of breath / dyspnea 2 Inhaler 2     atorvastatin (LIPITOR) 40 MG tablet Take 40 mg by mouth At Bedtime        meclizine (ANTIVERT) 25 MG tablet Take 1 tablet (25 mg) by mouth every 6 hours as needed for dizziness 30 tablet 1     prasugrel (EFFIENT) 10 MG TABS Take 10 mg by mouth every morning  30 tablet      aspirin EC 81 MG tablet Take 81 mg by mouth every morning         "ipratropium - albuterol 0.5 mg/2.5 mg/3 mL (DUONEB) 0.5-2.5 (3) MG/3ML nebulization Take 3 mLs by nebulization every 6 hours as needed for shortness of breath / dyspnea 30 vial 2     nitroglycerin (NITROSTAT) 0.4 MG sublingual tablet Place 1 tablet (0.4 mg) under the tongue every 5 minutes as needed for chest pain 25 tablet 11     diclofenac (VOLTAREN) 1 % GEL topical gel Apply  2 grams to hands four times daily using enclosed dosing card. (Patient taking differently: Apply topically as needed Apply  2 grams to hands four times daily using enclosed dosing card.) 100 g 0     order for DME Equipment being ordered: incentive spirometry 1 Device 0     Allergies  Allergies   Allergen Reactions     Sulfa Drugs Rash and Hives     Plavix [Clopidogrel] Other (See Comments)     Other reaction(s): Other - Describe In Comment Field  Not effective at preventing clots  P2Y12 testing done at [facility] in 12/2014 indicates a non responder to Plavix     Sulfasalazine Rash     Social History  Social History     Social History     Marital status:      Spouse name: N/A     Number of children: N/A     Years of education: N/A     Occupational History     Not on file.     Social History Main Topics     Smoking status: Former Smoker     Packs/day: 0.25     Years: 42.00     Types: Cigarettes     Quit date: 6/24/2013     Smokeless tobacco: Never Used     Alcohol use 0.0 oz/week     0 Standard drinks or equivalent per week      Comment: A couple times a week, \"three beers max\"     Drug use: No     Sexual activity: Yes     Partners: Male     Birth control/ protection: Post-menopausal, Female Surgical     Other Topics Concern     Not on file     Social History Narrative     Family History  Family History   Problem Relation Age of Onset     Cervical Cancer Mother      Lymph nodes were also involved, no chemotherapy needed.       DIABETES Father      Hyperlipidemia Father      HEART DISEASE Maternal Grandfather      HEART DISEASE Paternal " "Grandmother      HEART DISEASE Paternal Grandfather      Glaucoma No family hx of      Macular Degeneration No family hx of      Hypertension No family hx of      CEREBROVASCULAR DISEASE No family hx of      Thyroid Disease No family hx of      Review of Systems  Functional status: Independent in ADL's.  4 METS.     The complete review of systems is negative other than noted in the HPI or here.   Constitutional: Denies recent changes fevers/chills.  Reports an approximately 17 pound unintentional weight loss since June, but states that her weight has since stabilized.  Reports poor sleep due to stress about her children.    Eyes: Glasses for vision correction.  No recent vision changes.  EENT: Denies recent changes in hearing, mouth pain, or difficulty swallowing.  Cardiovascular: Reports episodes of chest pain that occur 2-3 times/week.  They are unpredictable in onset.  Located mid-sternal no radiation, \"like someone \"grabbing me.\"  Episodes last several minutes.  Taking a deep breath makes the pain worse, NTG does not make it any better.  Unable to identify any other palliating factors.  States that these pains are DEFINITELY different than the episodes which led to coronary angiograms and intervention.  Reports MEYERS with diminished exercise tolerance, but patient states that this stable, and has been so for some time.  Denies PND or orthopnea, or palpitations.    Respiratory: Denies significant cough.    GI: Denies nausea/vomiting or diarrhea/constipation.    : Denies dysuria.    Musculoskeletal: Denies joint pain or swelling.    Skin: Denies rashes or wounds.    Hematologic: Denies bleeding issues, notes that she bruises easily due to the ASA and Effient.    Neurologic: Denies migraines, seizures, dizziness.  Reports numbness in the fingertips of both hands.  Psychiatric: Reports increased stress due to two of her children being addicted to methamphetamine.      /73  Pulse 74  Temp 98  F (36.7  C) " "(Oral)  Resp 17  Ht 1.521 m (4' 11.9\")  Wt 57.3 kg (126 lb 5.2 oz)  SpO2 96%  BMI 24.75 kg/m2    126 lbs 5.18 oz  4' 11.9\"   Body mass index is 24.75 kg/(m^2).    Physical Exam  Constitutional: Patient awake, seated upright in a chair, in no apparent distress.  Appears stated age.  Eyes: Pupils equal, round and reactive to light.  Extra ocular muscles intact. Sclera clear.  Conjunctiva normal.  HENT: Head normocephalic.  Oral pharynx intact with moist mucous membranes.  Dentition with an upper denture plate and lower partial.  No thyromegaly appreciated.   Respiratory: Lung sounds clear to auscultation bilaterally.  No rales, rhonchi, or wheezing noted.    Cardiovascular: S1, S2, regular rate and rhythm.  No murmurs, rubs, or gallops noted. Radial and pedal pulses palpable, bilaterally.  No edema noted.   GI: Bowel sounds present.  Abdomen rounded, soft, non-tender to light palpation.  No hepatosplenomegaly or masses palpated.   Genitourinary: Exam deferred.  Lymph/Hematologic: No cervical or supraclavicular lymphadenopathy noted.  No excessive bruising noted.    Skin: Color appropriate for race, warm, dry.  No rashes or wounds at anticipated surgical site.   Musculoskeletal: Slightly limited extension of the neck.  No redness, warmth, or swelling of the joints noted. Gross motor strength is normal.    Neurologic: Alert, oriented to name, place and time. Cranial nerves II-XII are grossly intact. Gait is normal.      Neuropsychiatric: Calm, cooperative. Normal affect.     Labs:  17: WBC:  6.2; Hgb: 13.1; Hct: 39.3; Plt: 321  17: Na: 139; K: 4.9; Cl: 104; Glu: 106; BUN: 18; Cr: 0.98; Ca: 9.4; Bili total: 1.6; Albumin: 4; Alk phos: 81; ALT: 25; AST: 20;     Imagin14 Coronary Angiogram:  DIAGNOSTIC - CORONARY   Right dominant coronary artery system   The previously deployed stent in the proximal LAD artery is widely patent   50% stenosis in the ostial Circumflex   50% stenosis in the mid " RCA  INTERVENTION   Intracoronary ultrasound was used for lesion assessment.  Successful angioplasty of the ostial circumflex.  Successful angioplasty of the ostial LAD.  CASE NOTES   Comments: IVUS showed that the LCX stent was patent without restenosis but the ostial stent struts were pushed towards each other, likely by the LAd stent that was hanging into the LMAIN. To treat this, would, and I did, require kissing balloons, one in the LAD stent and the other in the LCX stent.   RECOMMENDATIONS & PLAN   Medical Rx.  Switch to Effient.    10/12/16 Cardiac Stress MRI:  Final conclusions:  1.  Normal Lexiscan stress MRI. There is no evidence of ischemia.  2.  The gadolinium delayed enhancement showed no evidence of scar or fibrosis in the myocardium.    3.  The left ventricle size is normal with normal wall thickness.  The systolic function is normal with a calculated ejection fraction of 56.7%.  4.  The right ventricle is normal in size and wall thickness with normal systolic function, calculated ejection fraction 50.2%.  5.  Collectively these findings are consistent with a normal stress cardiac MRI.    6/6/17 Pelvic Ultrasound:  IMPRESSION:   1. No significant change in large right adnexal cyst measuring up to 10.9 cm.  2. Mild increase in size of a much smaller left ovarian cyst measuring up to 1.7 cm (previously 1.3 cm).  3. Unchanged myometrial fibroid measuring up to 2.2 cm.    8/29/17 EKG: Sinus bradycardia.      Lab results, EKG were personally reviewed by this provider.      Outside records from [facility] reviewed.    Assessment and Plan  Michela Cole is a 66 year old female scheduled to undergo Laparoscopic Bilateral Salpingo-Oophorectomy, Possible Cancer Staging and Tumor Debulking with Hysterectomy, Lymphadenectomy, Possible Open on 9/11/17 with Dr. Melody Bolivar.    She has the following specific operative considerations:   1.  History of postoperative nausea/vomiting: Recommend use of antiemetic  agents in the perioperative period.    2.  GERD: Patient instructed to take Protonix as prescribed.  Consider use of RSI techniques with advanced airway maneuvers.  3.  COPD: Patient instructed to use Duo-Neb and Albuterol inhaler as prescribed.  Orders entered for a Duo-Neb to be administered in Pre-Op, as well as an RT consult in PACU.  4.  Coronary artery disease with intermittent episodes of chest pain: Patient instructed to continue Toprol, Imdur, and Lipitor as prescribed.  Will contact the patient's Cardiologist, Dr. Duff, regarding holding ASA and Effient in preparation for surgery.  Would like for the patient to be evaluated by her Cardiologist prior to surgery.    5.  HTN: Patient instructed to continue Toprol as prescribed.  6.  Type & screen, CBC, CMP, BNP, EKG today.      ADDENDUM ON 9/5/17:  Reviewed 8/30/17 note from Dr. Duff with [facility] [place].  The patient does not require any further cardiac evaluation prior to the planned procedure.  The patient is to hold her Aspirin and [Effient] as instructed by Dr. Bolivar.      Revised Cardiac Risk Index: 0.9% risk of major adverse cardiac event.  VTE risk: 0.5%  FAWN risk: Intermediate.  PONV risk score= 3.  (If > 2, anti-emetic intervention is recommended.)  Anesthesia considerations: Refer to PAC assessment in the anesthesia records.    Patient was discussed with Dr. Gupta.    Helga Byers NP  Preoperative Assessment Center  [facility]  Phone: [phone]  Fax: [phone]

## 2017-08-29 NOTE — MR AVS SNAPSHOT
After Visit Summary   8/29/2017    Michela Cole    MRN: [id number]           Patient Information     Date Of Birth          1950        Visit Information        Provider Department      8/29/2017 10:30 AM Melody Bolivar MD [facility]        Today's Diagnoses     Ovarian cyst    -  1       Follow-ups after your visit        Your next 10 appointments already scheduled     Aug 29, 2017 11:30 AM CDT   (Arrive by 11:15 AM)   PAC EVALUATION with  Pac Denzel 3   [facility] Preoperative Assessment [place] ([facility])    [address] [place] [address]  4th [facility] [address]   [phone]            Aug 29, 2017 12:30 PM CDT   (Arrive by 12:15 PM)   PAC RN ASSESSMENT with  Pac Rn   [facility] Assessment [place] ([facility])    [address] [place] [id number] [phone]            Aug 29, 2017  1:00 PM CDT   (Arrive by 12:45 PM)   PAC Anesthesia Consult with  Pac Anesthesiologist   [facility] Assessment [place] ([facility])    [address] [place] [id number] [phone]            Aug 29, 2017  1:15 PM CDT   [facility] Lab Draw with  [facility] LAB DRAW   [facility] ([facility])    [address] [place] [id number] [phone]            Sep 11, 2017   Procedure with Melody Bolivar MD   [facility], [place], Same Day Surgery (--)    [address] [facility] [id number] [phone]              Who to contact     If you have questions or need follow up information about today's clinic visit or your schedule please contact [facility] directly at [phone].  Normal or non-critical lab and imaging results will be communicated to you by MyChart, letter or phone within 4 business days after the clinic has received the results. If you do not hear  "from us within 7 days, please contact the clinic through [employer] or phone. If you have a critical or abnormal lab result, we will notify you by phone as soon as possible.  Submit refill requests through [employer] or call your pharmacy and they will forward the refill request to us. Please allow 3 business days for your refill to be completed.          Additional Information About Your Visit        [facility] [employer] Information     [employer] lets you send messages to your doctor, view your test results, renew your prescriptions, schedule appointments and more. To sign up, go to [email or web address] . Click on \"Log in\" on the left side of the screen, which will take you to the Welcome page. Then click on \"Sign up Now\" on the right side of the page.     You will be asked to enter the access code listed below, as well as some personal information. Please follow the directions to create your username and password.     Your access code is: [email or web address]  Expires: 2017  6:30 AM     Your access code will  in 90 days. If you need help or a new code, please call your [place] clinic or [phone].        Care EveryWhere ID     This is your Care EveryWhere ID. This could be used by other organizations to access your [place] medical records  [id number]        Your Vitals Were     Pulse Temperature Respirations Height Pulse Oximetry BMI (Body Mass Index)    74 98  F (36.7  C) (Oral) 17 1.521 m (4' 11.9\") 96% 24.77 kg/m2       Blood Pressure from Last 3 Encounters:   17 112/73   17 102/45   17 136/74    Weight from Last 3 Encounters:   17 57.3 kg (126 lb 6.4 oz)   17 59.8 kg (131 lb 12.8 oz)   17 64.4 kg (142 lb)              We Performed the Following     ABO/Rh type and screen          CBC with platelets differential     Comprehensive metabolic panel          Today's Medication Changes          These changes are accurate as of: 17 11:09 AM.  If you have any questions, ask your " nurse or doctor.               These medicines have changed or have updated prescriptions.        Dose/Directions    diclofenac 1 % Gel topical gel   Commonly known as:  VOLTAREN   This may have changed:    - how to take this  - when to take this  - reasons to take this  - additional instructions   Used for:  Primary osteoarthritis of both hands        Apply  2 grams to hands four times daily using enclosed dosing card.   Quantity:  100 g   Refills:  0                Primary Care Provider Office Phone # Fax #    Frannie Sharon Allen [phone]       [address] [facility] [address]        Goals        General    Functional (pt-stated)     Notes - Note created  9/1/2015 11:20 AM by Janneth Orellana, RN    I will rinse my mouth out after using my inhaler to help keep the lining of my mouth healthy  As of today's date 9/1/2015 goal is met at 0 - 25%.   Goal Status:  Ongoing      Medication (pt-stated)     Notes - Note edited  9/1/2015  2:10 PM by Janneth Orellana, RN    I will use my inhalers as instructed  As of today's date 9/1/2015 goal is met at 0 - 25%.   Goal Status:  Activeedule  Appointment with ENT       Psychosocial (pt-stated)     Notes - Note edited  9/1/2015  2:10 PM by Janneth Orellana, RN    I will  Investigate counseling and/or support groups if and when I am ready to get formal support  As of today's date 9/1/2015 goal is met at 0 - 25%.   Goal Status:  Ongoing        Equal Access to Services     [facility] AH: Hadii marya lio Sojohn, waaxda luqadaha, qaybta kaalmada glenny, charline rose. So [facility] [phone].    ATENCIÓN: Si habla español, tiene a de la rosa disposición servicios gratuitos de asistencia lingüística. Llame al [phone].    We comply with applicable federal civil rights laws and [place] laws. We do not discriminate on the basis of race, color, national origin, age, disability sex, sexual orientation or gender identity.             Thank you!     Thank you for choosing [facility]  for your care. Our goal is always to provide you with excellent care. Hearing back from our patients is one way we can continue to improve our services. Please take a few minutes to complete the written survey that you may receive in the mail after your visit with us. Thank you!             Your Updated Medication List - Protect others around you: Learn how to safely use, store and throw away your medicines at www.disposemymeds.org.          This list is accurate as of: 8/29/17 11:09 AM.  Always use your most recent med list.                   Brand Name Dispense Instructions for use Diagnosis    albuterol 108 (90 BASE) MCG/ACT Inhaler    PROAIR HFA/PROVENTIL HFA/VENTOLIN HFA    2 Inhaler    Inhale 2 puffs into the lungs every 6 hours as needed for shortness of breath / dyspnea    COPD (chronic obstructive pulmonary disease) (H)       aspirin EC 81 MG EC tablet      Take 81 mg by mouth daily        atorvastatin 40 MG tablet    LIPITOR     Take 40 mg by mouth daily    CARDIOVASCULAR SCREENING; LDL GOAL LESS THAN 130       buPROPion 200 MG 12 hr tablet    WELLBUTRIN SR    60 tablet    Take 1 tablet (200 mg) by mouth 2 times daily    Anxiety       cyclobenzaprine 5 MG tablet    FLEXERIL    60 tablet    Take 1 tablet (5 mg) by mouth 3 times daily as needed for muscle spasms    Chest wall injury, subsequent encounter       diclofenac 1 % Gel topical gel    VOLTAREN    100 g    Apply  2 grams to hands four times daily using enclosed dosing card.    Primary osteoarthritis of both hands       EFFIENT 10 MG Tabs tablet   Generic drug:  prasugrel     30 tablet    Take by mouth daily        escitalopram 20 MG tablet    LEXAPRO    90 tablet    Take 1 tablet (20 mg) by mouth daily    Major depressive disorder, recurrent episode, moderate (H)       ipratropium - albuterol 0.5 mg/2.5 mg/3 mL 0.5-2.5 (3) MG/3ML neb solution    DUONEB    30 vial    Take 3 mLs by  nebulization every 6 hours as needed for shortness of breath / dyspnea    COPD (chronic obstructive pulmonary disease) (H)       isosorbide mononitrate 30 MG 24 hr tablet    IMDUR    180 tablet    Take 2 tablets (60 mg) by mouth daily    Coronary artery disease involving native coronary artery of native heart without angina pectoris       levothyroxine 75 MCG tablet    SYNTHROID/LEVOTHROID    90 tablet    Take 1 tablet (75 mcg) by mouth daily    Hypothyroidism, unspecified type       LORazepam 0.5 MG tablet    ATIVAN    30 tablet    TAKE 1/2-1 TABLET BY MOUTH EVERY 8 HOURS AS NEEDED FOR ANXIETY. DO NOT OPERATE A VEHICLE AFTER TAKING THIS MEDICATION    Anxiety       meclizine 25 MG tablet    ANTIVERT    30 tablet    Take 1 tablet (25 mg) by mouth every 6 hours as needed for dizziness    BPV (benign positional vertigo), left       metoprolol 50 MG 24 hr tablet    TOPROL-XL    90 tablet    Take 1 tablet (50 mg) by mouth daily    Coronary artery disease involving native coronary artery of native heart without angina pectoris       nitroGLYcerin 0.4 MG sublingual tablet    NITROSTAT    25 tablet    Place 1 tablet (0.4 mg) under the tongue every 5 minutes as needed for chest pain    Coronary artery disease involving native coronary artery of native heart without angina pectoris       order for DME     1 Device    Equipment being ordered: incentive spirometry    Pneumonia of right middle lobe due to infectious organism (H)       pantoprazole 40 MG EC tablet    PROTONIX    180 tablet    TAKE 1 TABLET BY MOUTH 2 TIMES DAILY. TAKE BY MOUTH 30-60 MINUTES BEFORE A MEAL    GERD (gastroesophageal reflux disease)       vitamin D 2000 UNITS tablet      Take 2,000 Units by mouth daily    Vitamin D deficiency

## 2017-08-29 NOTE — ANESTHESIA PREPROCEDURE EVALUATION
"  Anesthesia Evaluation     . Pt has had prior anesthetic. Type: General and MAC    History of anesthetic complications   - PONV        ROS/MED HX    ENT/Pulmonary: Comment: Uses albuterol and Duo-nebs once a day.  Insurance will not cover Spiriva any longer.    (+)FAWN risk factors snores loudly, hypertension, tobacco use, Past use moderate COPD, , . .   (-) recent URI   Neurologic:     (+)neuropathy - In the fingers of both hands.,     Cardiovascular: Comment: Reports episodes of chest pain that occur 2-3 times/week.  They are unpredictable in onset.  Located mid-sternal no radiation, \"like someone \"grabbing me\"  Episodes last several minutes.  Taking a deep breath makes the pain worse, NTG does not make it any better.  Unable to identify any other palliating factors.  States that these painsare  DEFINITELY different than the episodes which led to coronary angiograms and intervention.    Diminished exercise tolerance, patient states that this stable.      (+) Dyslipidemia, hypertension--CAD, angina-past MI,-stent,Drug Eluting Stent .. Taking blood thinners : . . MEYERS, . :. .      (-) CHF and orthopnea/PND   METS/Exercise Tolerance:  3 - Able to walk 1-2 blocks without stopping   Hematologic:     (+) History of Transfusion no previous transfusion reaction Other Hematologic Disorder-History of idiopathic thrombocytopenic purpura, treated with spleenectomy and radioactive thyroid therapy      Musculoskeletal:  - neg musculoskeletal ROS       GI/Hepatic:     (+) GERD Asymptomatic on medication,       Renal/Genitourinary:  - ROS Renal section negative       Endo:     (+) thyroid problem hypothyroidism, .      Psychiatric:     (+) psychiatric history anxiety and depression      Infectious Disease:  - neg infectious disease ROS       Malignancy:      - no malignancy   Other:    - neg other ROS                 Physical Exam      Airway   Mallampati: I  TM distance: <3 FB  Neck ROM: limited    Dental   (+) upper dentures and " partials    Cardiovascular   Rhythm and rate: regular and normal  (-) no peripheral edema and no murmur    Pulmonary    breath sounds clear to auscultation    Other findings: 8/29/17: WBC:  6.2; Hgb: 13.1; Hct: 39.3; Plt: 321  8/29/17: Na: 139; K: 4.9; Cl: 104; Glu: 106; BUN: 18; Cr: 0.98; Ca: 9.4; Bili total: 1.6; Albumin: 4; Alk phos: 81; ALT: 25; AST: 20;     12/24/14 Coronary Angiogram:  DIAGNOSTIC - CORONARY   Right dominant coronary artery system   The previously deployed stent in the proximal LAD artery is widely patent   50% stenosis in the ostial Circumflex   50% stenosis in the mid RCA  INTERVENTION   Intracoronary ultrasound was used for lesion assessment.  Successful angioplasty of the ostial circumflex.  Successful angioplasty of the ostial LAD.  CASE NOTES   Comments: IVUS showed that the LCX stent was patent without restenosis but the ostial stent struts were pushed towards each other, likely by the LAd stent that was hanging into the LMAIN. To treat this, would, and I did, require kissing balloons, one in the LAD stent and the other in the LCX stent.   RECOMMENDATIONS & PLAN   Medical Rx.  Switch to Effient.    10/12/16 Cardiac Stress MRI:  Final conclusions:  1.  Normal Lexiscan stress MRI. There is no evidence of ischemia.  2.  The gadolinium delayed enhancement showed no evidence of scar or fibrosis in the myocardium.    3.  The left ventricle size is normal with normal wall thickness.  The systolic function is normal with a calculated ejection fraction of 56.7%.  4.  The right ventricle is normal in size and wall thickness with normal systolic function, calculated ejection fraction 50.2%.  5.  Collectively these findings are consistent with a normal stress cardiac MRI.    5/7/17 EKG: Sinus rhythm    6/6/17 Pelvic Ultrasound:  IMPRESSION:   1. No significant change in large right adnexal cyst measuring up to 10.9 cm.  2. Mild increase in size of a much smaller left ovarian cyst measuring up  to 1.7 cm (previously 1.3 cm).  3. Unchanged myometrial fibroid measuring up to 2.2 cm.    8/29/17 EKG: Sinus bradycardia.               PAC Discussion and Assessment    ASA Classification: 3  Case is suitable for: [facility]  Anesthetic techniques and relevant risks discussed: GA  Invasive monitoring and risk discussed: Yes  Types:   Possibility and Risk of blood transfusion discussed: Yes  NPO instructions given:   Additional anesthetic preparation and risks discussed:   Needs early admission to pre-op area:   Other:     PAC Resident/NP Anesthesia Assessment:  Michela Cole is a 66 year old female scheduled to undergo Laparoscopic Bilateral Salpingo-Oophorectomy, Possible Cancer Staging and Tumor Debulking with Hysterectomy, Lymphadenectomy, Possible Open on 9/11/17 with Dr. Melody Bolivar.    She has the following specific operative considerations:   1.  History of postoperative nausea/vomiting: Recommend use of antiemetic agents in the perioperative period.    2.  GERD: Patient instructed to take Protonix as prescribed.  Consider use of RSI techniques with advanced airway maneuvers.  3.  COPD: Patient instructed to use Duo-Neb and Albuterol inhaler as prescribed.  Orders entered for a Duo-Neb to be administered in Pre-Op, as well as an RT consult in PACU.  4. Coronary artery disease with intermittent episodes of chest pain: Patient instructed to continue Toprol, Imdur, and Lipitor as prescribed.  Will contact the patient's Cardiologist, Dr. Duff, regarding holding ASA and Effient in preparation for surgery.  Would like for the patient to be evaluated by her Cardiologist prior to surgery.    5.  HTN: Patient instructed to continue Toprol as prescribed.  6.  Type & screen, CBC, CMP, BNP, EKG today.      Revised Cardiac Risk Index: 0.9% risk of major adverse cardiac event.  VTE risk: 0.5%  FAWN risk: Intermediate.  PONV risk score= 3.  (If > 2, anti-emetic intervention is recommended.)  Anesthesia  considerations: Refer to PAC assessment in the anesthesia records.        Reviewed and Signed by PAC Mid-Level Provider/Resident  Mid-Level Provider/Resident: Helga Byers CNP  Date: 8/29/17  Time: 1715    Attending Anesthesiologist Anesthesia Assessment:  I have examined the patient and reviewed the medical record.  I have discussed the patient with the SANJEEV and concur with her assessment.  The patient is scheduled for SOPHIE/BSO with possible tumor debulking for an ovarian mass which has been followed for 2 years.  The patient has a complicated medical history:    CVS :  Patient has 3 MARQUES placed 8/2014 (LAD,D1, Circ) with subsequent early restenosis on plavix.  Now on ASA and Effient.  Followed by cardiology.  Last seen 9/2016 with atypical CP.  Stress test negative with normal EF.  COnitnued on medical management (toprol and Imdur) with stable atypical CP.  Functional activity limited to 2-3 METs by COPD.    Pulm:  COPD secondary to tobacco use, now stable on Combivent and albuterol, no O2 or steroids  Heme:  S/p splenectomy for thrombocytopenia, most recent platelet count 306 4/2017  Renal:  History of ARF in 2015 with sepsis.  Most recent labs 4/2017 GFR 46.  GI:  GERD controlled with medication    PE:  Pleasant female in NAD.  MPC 2 with full upper denture.  3 FBTMD  Lungs clear but with diminished BS  CVS  RRR with no murmur    Will have patient see her cardiologist prior to surgery given restenosis of MARQUES while on DAPT and ongoing chest pain.  Seek recommendation on management of effient.  Repeat BMP and CBC.  Order duoneb therapy day of surgery  Final plan per attending anesthesiologist the day of surgery.  Note history of CRI and splenectomy      Reviewed and Signed by PAC Anesthesiologist  Anesthesiologist: Deondre Gupta MD  Date: 08/29/2017  Time:   Pass/Fail:   Disposition:     PAC Pharmacist Assessment:        Pharmacist:   Date:   Time:                           .

## 2017-08-29 NOTE — PROGRESS NOTES
Consult Notes on Referred Patient    Date: 8/29/2017     The patient returns today for follow-up.     Her history is as follows:  Patient originally referred for evaluation of an incidentally noted adnexal mass.  She was admitted to [facility] from 10/22 to 10/28/15 with sepsis from UTI. During this hospitalization, she was apparantly noted to have an adnexal mass. She had a pelvic US on 10/25/15 which showed uterus at 11.9 x 2.7 x 4.4cm, ES 4mm, right ovary and left ovary not seen. Posterior to uterus a large cystic lesion measuring 9.0 x 5.3 x 7.1cm. MRI done for follow-up showed no evidence of nodularity or enhancement. CT CAP on the same day showed an 8cm cyst likely arising from right ovary.  6. Patient referred for further evaluation.      Medical history notable for graves disease, COPD (no oxygen), ITP, Cataracts, CAD(LAD stents). Her last stent was placed in Sept 2014. She had an MI in Dec 2014 which was felt to be secondary to Plavix. . Surgical history notable for tubal ligation, Splenectomy, appendectomy, c/sxn x 4. She went through menopause in her 50s, no HRT, no PMB. She is not sexually active. Lives with boyfriend of 18 yrs. Works as .      12-29-15:  Seen for initial consultation.  Recommended repeat pelvic US and  given length of time from first imaging.      1-4-16:  Pelvic US with uterus at 9.1 x 3.0 x 4.3cm, ES 3mm.  Left ovary 1.9cm, large cyst deep in pelvis measuring 8.7 x 5.4 x 7.6 with debris and possible projections.  No wall thickening.       2-8-16:  Uterus 7.0 x 4.7, ES 2.3mm, left ovary normal, pelvic cyst measuring 5.3 x 7.8 x 9.2cm      5-10-16:  Uterus 8.6 x 5cm, ES 3mm, pelvic cyst 9.5 x 7.5 x 5.1, stable      11-8-16:  Uterus 9.6 x 2.9 x 4.8, ES 4mm.  Right ovary 10 x 5.3 x 7.7.  Stable small left ovarian cyst 1.3cm. Discussed proceeding with surgery versus ongoing observation. Patient elected to proceed with  observation.    6/6/17:  Uterus 7.2 x 2.9 x 4.4, ES 3mm.  Right ovary 10.9 x 5.3 x 8.3, large simple cyst relatively stable.  Simple left ovarian cyst, slightly increased to 1.7cm. Patient decided to move forward with treatment.    The patient returns today for follow-up.  No changes in ROS since last visit.  No new complaints, no abdominal pain, no fevers, no chills, no nausea or vomiting.  Appetite and energy good, weight stable.  No chest pain or shortness or breath.  No new pain symptoms.  No abdominal pain, no difficulty with bowel or bladder function, no constipation or diarrhea, no urinary leakage/urgency/pain.  No vaginal bleeding, is not sexually active.  No lower extremity pain or swelling.          Past Medical History:    Past Medical History:   Diagnosis Date     CAD (coronary artery disease) 09/26/2014    Treated with multiple PCI     COPD (chronic obstructive pulmonary disease) (H)      GERD (gastroesophageal reflux disease) 2/4/2010     H/O idiopathic thrombocytopenic purpura 1978     History of blood transfusion 2005    x 2     History of Graves' disease 1978     Hyperlipidemia LDL goal <130 9/23/2009     Hypothyroidism 9/23/2009     Iron deficiency anemia      Major depression      Migraines 2/4/2010         Past Surgical History:    Past Surgical History:   Procedure Laterality Date     APPENDECTOMY       C/SECTION, LOW TRANSVERSE      x 4     SPLENECTOMY  1978    ITP     TUBAL LIGATION      x2         Health Maintenance:  Health Maintenance Due   Topic Date Due     EYE EXAM Q1 YEAR  08/06/2016     ADVANCE DIRECTIVE PLANNING Q5 YRS  09/19/2016     COPD ACTION PLAN Q1 YR  07/07/2017     DEPRESSION ACTION PLAN Q1 YR  07/07/2017       Current Medications:     has a current medication list which includes the following prescription(s): bupropion, pantoprazole, vitamin d, levothyroxine, escitalopram, metoprolol, isosorbide mononitrate, nitroglycerin, lorazepam, diclofenac, albuterol, atorvastatin,  "meclizine, cyclobenzaprine, order for dme, prasugrel, aspirin ec, and ipratropium - albuterol 0.5 mg/2.5 mg/3 ml.       Allergies:     [unfilled]        Social History:     Social History   Substance Use Topics     Smoking status: Former Smoker     Packs/day: 0.25     Years: 42.00     Types: Cigarettes     Quit date: 6/24/2013     Smokeless tobacco: Never Used     Alcohol use 0.0 oz/week     0 Standard drinks or equivalent per week      Comment: a few drinks weekly       History   Drug Use No           Family History:     The patient's family history is notable for :    Family History   Problem Relation Age of Onset     CANCER Mother      lymph nodes, no chemo, cervical cancer     DIABETES Father      Lipids Father      HEART DISEASE Maternal Grandfather      HEART DISEASE Paternal Grandmother      HEART DISEASE Paternal Grandfather      Glaucoma No family hx of      Macular Degeneration No family hx of      Hypertension No family hx of      CEREBROVASCULAR DISEASE No family hx of      Thyroid Disease No family hx of          Physical Exam:     /73  Pulse 74  Temp 98  F (36.7  C) (Oral)  Resp 17  Ht 1.521 m (4' 11.9\")  Wt 57.3 kg (126 lb 6.4 oz)  SpO2 96%  BMI 24.77 kg/m2  Body mass index is 24.77 kg/(m^2).    General Appearance: healthy and alert, no distress     Assessment:    Michela Cole is a 66 year old woman with an incidental ovarian cyst    A total of 30 minutes was spent with the patient, 25 minutes of which were spent in counseling the patient and/or treatment planning.          Plan:       1.)        Incidental pelvic cyst:  Cyst remains simple in appearance, but continues to slightly increase in size.  Patient remains asymptomatic. She has elected to proceed with surgery.  Would attempt laparoscopic removal but discussed possibility of need for conversion to laparotomy in the event of adhesive disease.  Discussed surgical approach, hospital stay, restrictions after " surgery.    Recommend laparoscopic removal of both fallopian tubes and both ovaries.  Possible cancer staging and tumor debulking with hysterectomy, lymphadenectomy, laparotomy.  Risks, benefits and alternatives to proceed discussed in detail with the patient. Risks include but are not limited to bleeding, infection, possible injury to surrounding organs including bowel, bladder, ureter, need for second procedure/surgery related to complications from first procedure, postoperative medical complications such as cardiopulmonary events, lymphedema, lymphocyst, thromboembolic events. Also discussed specific risks related to the procedure including conversion to laparotomy due to adhesive disease, need for additional treatment. Consent for surgery, blood transfusion signed.  Will arrange appropriate preoperative blood work, PAC visit. Patient also advised on need for postoperative surveillance and/or adjuvant therapy. Questions answered, patient scheduled for 9-11-17.      Her partner will be with her on the day of surgery, her son is her medical decision maker in the event that she is unable.    Melody Bolivar MD  Gynecologic Oncology  [facility]    CC  Patient Care Team:  Frannie Allen MD as PCP - General (Internal Medicine)  PRESLEY HORNER

## 2017-08-29 NOTE — MR AVS SNAPSHOT
After Visit Summary   2017    Michela Cole    MRN: [id number]           Patient Information     Date Of Birth          1950        Visit Information        Provider Department      2017 12:30 PM Rn, [facility]        Care Instructions    Preparing for Your Surgery      Name:  Michela Cole   MRN:  [id number]   :  1950   Today's Date:  2017     Arriving for surgery:  Surgery date:  17  Surgery time:  7:30 am  Arrival time:  5:30 am  Please come to:       [facility] [address]  [address] [place]  [address]    -   parking is available in front of the hospital from 5:15 am to 8:00 pm    -  Stop at the Information Desk in the lobby    -   Inform the information person that you are here for surgery. An escort to 3c will be provided. If you would not like an escort, please proceed to 3C on the 3rd floor. [phone]     What can I eat or drink?  -  You may have solid food or milk products until 8 hours prior to your surgery. (11:30 pm 9/10/17)  -  You may have water, apple juice or 7up/Sprite until 2 hours prior to your surgery. (5:30 am)    Which medicines can I take?       -  Follow Aspirin and Effient instructions per cardiologist.        -  Please take these medications the day of surgery: all other prescription morning medications including inhaler (bring inhaler to hospital)    How do I prepare myself?  -  Take two showers: one the night before surgery; and one the morning of surgery.         Use Scrubcare or Hibiclens to wash from neck down.  You may use your own shampoo and conditioner. No other hair products.   -  Do NOT use lotion, powder, deodorant, or antiperspirant the day of your surgery.  -  Do NOT wear any makeup, fingernail polish or jewelry.  -  Do not bring your own medications to the hospital, except for inhalers and eye drops.  -  Bring your ID and insurance  card.    Questions or Concerns:  If you have questions or concerns, please call the  Preoperative Assessment Center, Monday-Friday 7AM-7PM:  [phone]              AFTER YOUR SURGERY  Breathing exercises   Breathing exercises help you recover faster. Take deep breaths and let the air out slowly. This will:     Help you wake up after surgery.    Help prevent complications like pneumonia.  Preventing complications will help you go home sooner.   We may give you a breathing device (incentive spirometer) to encourage you to breathe deeply.   Nausea and vomiting   You may feel sick to your stomach after surgery; if so, let your nurse know.    Pain control:  After surgery, you may have pain. Our goal is to help you manage your pain. Pain medicine will help you feel comfortable enough to do activities that will help you heal.  These activities may include breathing exercises, walking and physical therapy.   To help your health care team treat your pain we will ask: 1) If you have pain  2) where it is located 3) describe your pain in your words  Methods of pain control include medications given by mouth, vein or by nerve block for some surgeries.  We may give you a pain control pump that will:  1) Deliver the medicine through a tube placed in your vein  2) Control the amount of medicine you receive  3) Allow you to push a button to deliver a dose of pain medicine  Sequential Compression Device (SCD) or Pneumo Boots:  You may need to wear SCD S on your legs or feet. These are wraps connected to a machine that pumps in air and releases it. The repeated pumping helps prevent blood clots from forming.             Follow-ups after your visit        Your next 10 appointments already scheduled     Aug 29, 2017  1:00 PM CDT   (Arrive by 12:45 PM)   PAC Anesthesia Consult with  Pac Anesthesiologist   [facility] ([facility])    [address]  [address]   [phone]            Aug 29, 2017  1:15 PM CDT   [facility] Lab Draw with  [facility] LAB DRAW   [facility] ([facility])    [address]  [address] [facility] [address] [phone]            Sep 11, 2017   Procedure with Melody Bolivar MD   [facility] [place], Same Day Surgery (--)    [address]  [place] [address]   [phone]            Oct 17, 2017  1:00 PM CDT   (Arrive by 12:45 PM)   Return Visit with Melody Bolivar MD   [facility] ([facility])    [address]  2nd Floor  [facility] [address] [phone]              Future tests that were ordered for you today     Open Future Orders        Priority Expected Expires Ordered    ABO/Rh type and screen Routine 8/29/2017 9/28/2017 8/29/2017    CBC with platelets Routine 8/29/2017 9/28/2017 8/29/2017    Comprehensive metabolic panel Routine 8/29/2017 9/28/2017 8/29/2017    EKG 12-lead complete w/read - Clinics Routine 8/29/2017 9/28/2017 8/29/2017            Who to contact     Please call your clinic at [phone] to:    Ask questions about your health    Make or cancel appointments    Discuss your medicines    Learn about your test results    Speak to your doctor   If you have compliments or concerns about an experience at your clinic, or if you wish to file a complaint, please contact [facility] Patient Relations at [phone] or email us at [email or web address]         Additional Information About Your Visit        [employer] Information     [employer] is an electronic gateway that provides easy, online access to your medical records. With [employer], you can request a clinic appointment, read your test results, renew a prescription or communicate with your care team.     To sign up for [employer] visit the website at [email or web address]   You will be asked to enter the access code listed  below, as well as some personal information. Please follow the directions to create your username and password.     Your access code is: [email or web address]  Expires: 2017  6:30 AM     Your access code will  in 90 days. If you need help or a new code, please contact your [facility] or call [phone] for assistance.        Care EveryWhere ID     This is your Care EveryWhere ID. This could be used by other organizations to access your [facility] medical records  [id number]         Blood Pressure from Last 3 Encounters:   17 112/73   17 112/73   17 102/45    Weight from Last 3 Encounters:   17 57.3 kg (126 lb 5.2 oz)   17 57.3 kg (126 lb 6.4 oz)   17 59.8 kg (131 lb 12.8 oz)              Today, you had the following     No orders found for display         Today's Medication Changes          These changes are accurate as of: 17 12:51 PM.  If you have any questions, ask your nurse or doctor.               These medicines have changed or have updated prescriptions.        Dose/Directions    diclofenac 1 % Gel topical gel   Commonly known as:  VOLTAREN   This may have changed:    - how to take this  - when to take this  - reasons to take this  - additional instructions   Used for:  Primary osteoarthritis of both hands        Apply  2 grams to hands four times daily using enclosed dosing card.   Quantity:  100 g   Refills:  0       isosorbide mononitrate 30 MG 24 hr tablet   Commonly known as:  IMDUR   This may have changed:  when to take this   Used for:  Coronary artery disease involving native coronary artery of native heart without angina pectoris        Dose:  60 mg   Take 2 tablets (60 mg) by mouth daily   Quantity:  180 tablet   Refills:  1       levothyroxine 75 MCG tablet   Commonly known as:  SYNTHROID/LEVOTHROID   This may have changed:  when to take this   Used for:  Hypothyroidism, unspecified type        Dose:  75 mcg   Take 1  tablet (75 mcg) by mouth daily   Quantity:  90 tablet   Refills:  3       metoprolol 50 MG 24 hr tablet   Commonly known as:  TOPROL-XL   This may have changed:  when to take this   Used for:  Coronary artery disease involving native coronary artery of native heart without angina pectoris        Dose:  50 mg   Take 1 tablet (50 mg) by mouth daily   Quantity:  90 tablet   Refills:  3         Stop taking these medicines if you haven't already. Please contact your care team if you have questions.     escitalopram 20 MG tablet   Commonly known as:  LEXAPRO   Stopped by:  3,  Pac Denzel                    Primary Care Provider Office Phone # Fax #    Frannie Sharon Allen [phone]       [address] [facility] [address]        Goals        General    Functional (pt-stated)     Notes - Note created  9/1/2015 11:20 AM by Janneth Orellana RN    I will rinse my mouth out after using my inhaler to help keep the lining of my mouth healthy  As of today's date 9/1/2015 goal is met at 0 - 25%.   Goal Status:  Ongoing      Medication (pt-stated)     Notes - Note edited  9/1/2015  2:10 PM by Janneth Orellana RN    I will use my inhalers as instructed  As of today's date 9/1/2015 goal is met at 0 - 25%.   Goal Status:  Activeedule  Appointment with ENT       Psychosocial (pt-stated)     Notes - Note edited  9/1/2015  2:10 PM by Janneth Orellana, RN    I will  Investigate counseling and/or support groups if and when I am ready to get formal support  As of today's date 9/1/2015 goal is met at 0 - 25%.   Goal Status:  Ongoing        Equal Access to Services     [facility]: Hadii aad luz hadasho Sochanelali, waaxda luqadaha, qaybta kaalmada charline murrieta . So [facility] [phone].    ATENCIÓN: Si habla español, tiene a de la rosa disposición servicios gratuitos de asistencia lingüística. Llame al [phone].    We comply with applicable federal civil rights laws and [place] laws. We  do not discriminate on the basis of race, color, national origin, age, disability sex, sexual orientation or gender identity.            Thank you!     Thank you for choosing [facility]  for your care. Our goal is always to provide you with excellent care. Hearing back from our patients is one way we can continue to improve our services. Please take a few minutes to complete the written survey that you may receive in the mail after your visit with us. Thank you!             Your Updated Medication List - Protect others around you: Learn how to safely use, store and throw away your medicines at www.disposemymeds.org.          This list is accurate as of: 8/29/17 12:51 PM.  Always use your most recent med list.                   Brand Name Dispense Instructions for use Diagnosis    albuterol 108 (90 BASE) MCG/ACT Inhaler    PROAIR HFA/PROVENTIL HFA/VENTOLIN HFA    2 Inhaler    Inhale 2 puffs into the lungs every 6 hours as needed for shortness of breath / dyspnea    COPD (chronic obstructive pulmonary disease) (H)       aspirin EC 81 MG EC tablet      Take 81 mg by mouth every morning        atorvastatin 40 MG tablet    LIPITOR     Take 40 mg by mouth At Bedtime    CARDIOVASCULAR SCREENING; LDL GOAL LESS THAN 130       buPROPion 200 MG 12 hr tablet    WELLBUTRIN SR    60 tablet    Take 1 tablet (200 mg) by mouth 2 times daily    Anxiety       diclofenac 1 % Gel topical gel    VOLTAREN    100 g    Apply  2 grams to hands four times daily using enclosed dosing card.    Primary osteoarthritis of both hands       EFFIENT 10 MG Tabs tablet   Generic drug:  prasugrel     30 tablet    Take 10 mg by mouth every morning        ipratropium - albuterol 0.5 mg/2.5 mg/3 mL 0.5-2.5 (3) MG/3ML neb solution    DUONEB    30 vial    Take 3 mLs by nebulization every 6 hours as needed for shortness of breath / dyspnea    COPD (chronic obstructive pulmonary disease) (H)       isosorbide mononitrate 30 MG 24 hr  tablet    IMDUR    180 tablet    Take 2 tablets (60 mg) by mouth daily    Coronary artery disease involving native coronary artery of native heart without angina pectoris       levothyroxine 75 MCG tablet    SYNTHROID/LEVOTHROID    90 tablet    Take 1 tablet (75 mcg) by mouth daily    Hypothyroidism, unspecified type       LORazepam 0.5 MG tablet    ATIVAN    1 tablet    TAKE 1/2-1 TABLET BY MOUTH EVERY 8 HOURS AS NEEDED FOR ANXIETY. DO NOT OPERATE A VEHICLE AFTER TAKING THIS MEDICATION    Anxiety       meclizine 25 MG tablet    ANTIVERT    30 tablet    Take 1 tablet (25 mg) by mouth every 6 hours as needed for dizziness    BPV (benign positional vertigo), left       metoprolol 50 MG 24 hr tablet    TOPROL-XL    90 tablet    Take 1 tablet (50 mg) by mouth daily    Coronary artery disease involving native coronary artery of native heart without angina pectoris       nitroGLYcerin 0.4 MG sublingual tablet    NITROSTAT    25 tablet    Place 1 tablet (0.4 mg) under the tongue every 5 minutes as needed for chest pain    Coronary artery disease involving native coronary artery of native heart without angina pectoris       order for DME     1 Device    Equipment being ordered: incentive spirometry    Pneumonia of right middle lobe due to infectious organism (H)       pantoprazole 40 MG EC tablet    PROTONIX    180 tablet    TAKE 1 TABLET BY MOUTH 2 TIMES DAILY. TAKE BY MOUTH 30-60 MINUTES BEFORE A MEAL    GERD (gastroesophageal reflux disease)

## 2017-08-29 NOTE — LETTER
8/29/2017       RE: Michela Cole  [address] [facility]  [place] [address]     Dear Colleague,    Thank you for referring your patient, Michela Cole, to the [facility]. Please see a copy of my visit note below.                            Consult Notes on Referred Patient    Date: 8/29/2017     The patient returns today for follow-up.     Her history is as follows:  Patient originally referred for evaluation of an incidentally noted adnexal mass.  She was admitted to [facility] from 10/22 to 10/28/15 with sepsis from UTI. During this hospitalization, she was apparantly noted to have an adnexal mass. She had a pelvic US on 10/25/15 which showed uterus at 11.9 x 2.7 x 4.4cm, ES 4mm, right ovary and left ovary not seen. Posterior to uterus a large cystic lesion measuring 9.0 x 5.3 x 7.1cm. MRI done for follow-up showed no evidence of nodularity or enhancement. CT CAP on the same day showed an 8cm cyst likely arising from right ovary.  6. Patient referred for further evaluation.      Medical history notable for graves disease, COPD (no oxygen), ITP, Cataracts, CAD(LAD stents). Her last stent was placed in Sept 2014. She had an MI in Dec 2014 which was felt to be secondary to Plavix. . Surgical history notable for tubal ligation, Splenectomy, appendectomy, c/sxn x 4. She went through menopause in her 50s, no HRT, no PMB. She is not sexually active. Lives with boyfriend of 18 yrs. Works as .      12-29-15:  Seen for initial consultation.  Recommended repeat pelvic US and  given length of time from first imaging.      1-4-16:  Pelvic US with uterus at 9.1 x 3.0 x 4.3cm, ES 3mm.  Left ovary 1.9cm, large cyst deep in pelvis measuring 8.7 x 5.4 x 7.6 with debris and possible projections.  No wall thickening.       2-8-16:  Uterus 7.0 x 4.7, ES 2.3mm, left ovary normal, pelvic cyst measuring 5.3 x 7.8 x 9.2cm      5-10-16:  Uterus 8.6 x 5cm, ES 3mm, pelvic cyst 9.5  x 7.5 x 5.1, stable      11-8-16:  Uterus 9.6 x 2.9 x 4.8, ES 4mm.  Right ovary 10 x 5.3 x 7.7.  Stable small left ovarian cyst 1.3cm. Discussed proceeding with surgery versus ongoing observation. Patient elected to proceed with observation.    6/6/17:  Uterus 7.2 x 2.9 x 4.4, ES 3mm.  Right ovary 10.9 x 5.3 x 8.3, large simple cyst relatively stable.  Simple left ovarian cyst, slightly increased to 1.7cm. Patient decided to move forward with treatment.    The patient returns today for follow-up.  No changes in ROS since last visit.  No new complaints, no abdominal pain, no fevers, no chills, no nausea or vomiting.  Appetite and energy good, weight stable.  No chest pain or shortness or breath.  No new pain symptoms.  No abdominal pain, no difficulty with bowel or bladder function, no constipation or diarrhea, no urinary leakage/urgency/pain.  No vaginal bleeding, is not sexually active.  No lower extremity pain or swelling.          Past Medical History:    Past Medical History:   Diagnosis Date     CAD (coronary artery disease) 09/26/2014    Treated with multiple PCI     COPD (chronic obstructive pulmonary disease) (H)      GERD (gastroesophageal reflux disease) 2/4/2010     H/O idiopathic thrombocytopenic purpura 1978     History of blood transfusion 2005    x 2     History of Graves' disease 1978     Hyperlipidemia LDL goal <130 9/23/2009     Hypothyroidism 9/23/2009     Iron deficiency anemia      Major depression      Migraines 2/4/2010         Past Surgical History:    Past Surgical History:   Procedure Laterality Date     APPENDECTOMY       C/SECTION, LOW TRANSVERSE      x 4     SPLENECTOMY  1978    ITP     TUBAL LIGATION      x2         Health Maintenance:  Health Maintenance Due   Topic Date Due     EYE EXAM Q1 YEAR  08/06/2016     ADVANCE DIRECTIVE PLANNING Q5 YRS  09/19/2016     COPD ACTION PLAN Q1 YR  07/07/2017     DEPRESSION ACTION PLAN Q1 YR  07/07/2017       Current Medications:     has a current  "medication list which includes the following prescription(s): bupropion, pantoprazole, vitamin d, levothyroxine, escitalopram, metoprolol, isosorbide mononitrate, nitroglycerin, lorazepam, diclofenac, albuterol, atorvastatin, meclizine, cyclobenzaprine, order for dme, prasugrel, aspirin ec, and ipratropium - albuterol 0.5 mg/2.5 mg/3 ml.       Allergies:     [unfilled]        Social History:     Social History   Substance Use Topics     Smoking status: Former Smoker     Packs/day: 0.25     Years: 42.00     Types: Cigarettes     Quit date: 6/24/2013     Smokeless tobacco: Never Used     Alcohol use 0.0 oz/week     0 Standard drinks or equivalent per week      Comment: a few drinks weekly       History   Drug Use No           Family History:     The patient's family history is notable for :    Family History   Problem Relation Age of Onset     CANCER Mother      lymph nodes, no chemo, cervical cancer     DIABETES Father      Lipids Father      HEART DISEASE Maternal Grandfather      HEART DISEASE Paternal Grandmother      HEART DISEASE Paternal Grandfather      Glaucoma No family hx of      Macular Degeneration No family hx of      Hypertension No family hx of      CEREBROVASCULAR DISEASE No family hx of      Thyroid Disease No family hx of          Physical Exam:     /73  Pulse 74  Temp 98  F (36.7  C) (Oral)  Resp 17  Ht 1.521 m (4' 11.9\")  Wt 57.3 kg (126 lb 6.4 oz)  SpO2 96%  BMI 24.77 kg/m2  Body mass index is 24.77 kg/(m^2).    General Appearance: healthy and alert, no distress     Assessment:    Michela Cole is a 66 year old woman with an incidental ovarian cyst    A total of 30 minutes was spent with the patient, 25 minutes of which were spent in counseling the patient and/or treatment planning.          Plan:       1.)        Incidental pelvic cyst:   Cyst remains simple in appearance, but continues to slightly increase in size.  Patient remains asymptomatic. She has elected to proceed " with surgery.  Would attempt laparoscopic removal but discussed possibility of need for conversion to laparotomy in the event of adhesive disease.  Discussed surgical approach, hospital stay, restrictions after surgery.    Recommend laparoscopic removal of both fallopian tubes and both ovaries.  Possible cancer staging and tumor debulking with hysterectomy, lymphadenectomy, laparotomy.  Risks, benefits and alternatives to proceed discussed in detail with the patient. Risks include but are not limited to bleeding, infection, possible injury to surrounding organs including bowel, bladder, ureter, need for second procedure/surgery related to complications from first procedure, postoperative medical complications such as cardiopulmonary events, lymphedema, lymphocyst, thromboembolic events. Also discussed specific risks related to the procedure including conversion to laparotomy due to adhesive disease, need for additional treatment. Consent for surgery, blood transfusion signed.  Will arrange appropriate preoperative blood work, PAC visit. Patient also advised on need for postoperative surveillance and/or adjuvant therapy. Questions answered, patient scheduled for 9-11-17.      Her partner will be with her on the day of surgery, her son is her medical decision maker in the event that she is unable.    Melody Bolivar MD  Gynecologic Oncology  [facility]    CC  Patient Care Team:  Frannie Allen MD as PCP - General (Internal Medicine)  PRESLEY HORNER

## 2017-08-29 NOTE — PATIENT INSTRUCTIONS
Preparing for Your Surgery      Name:  Michela Cole   MRN:  [id number]   :  1950   Today's Date:  2017     Arriving for surgery:  Surgery date:  17  Surgery time:  7:30 am  Arrival time:  5:30 am  Please come to:       [facility] [address]  [address] [place]  [address]    -   parking is available in front of the hospital from 5:15 am to 8:00 pm    -  Stop at the Information Desk in the lobby    -   Inform the information person that you are here for surgery. An escort to 3c will be provided. If you would not like an escort, please proceed to 3C on the 3rd floor. [phone]     What can I eat or drink?  -  You may have solid food or milk products until 8 hours prior to your surgery. (11:30 pm 9/10/17)  -  You may have water, apple juice or 7up/Sprite until 2 hours prior to your surgery. (5:30 am)    Which medicines can I take?       -  Follow Aspirin and Effient instructions per cardiologist.        -  Please take these medications the day of surgery: all other prescription morning medications including inhaler (bring inhaler to hospital)    How do I prepare myself?  -  Take two showers: one the night before surgery; and one the morning of surgery.         Use Scrubcare or Hibiclens to wash from neck down.  You may use your own shampoo and conditioner. No other hair products.   -  Do NOT use lotion, powder, deodorant, or antiperspirant the day of your surgery.  -  Do NOT wear any makeup, fingernail polish or jewelry.  -  Do not bring your own medications to the hospital, except for inhalers and eye drops.  -  Bring your ID and insurance card.    Questions or Concerns:  If you have questions or concerns, please call the  Preoperative Assessment Center, Monday-Friday 7AM-7PM:  [phone]              AFTER YOUR SURGERY  Breathing exercises   Breathing exercises help you recover faster. Take deep breaths and let the air out slowly. This  will:     Help you wake up after surgery.    Help prevent complications like pneumonia.  Preventing complications will help you go home sooner.   We may give you a breathing device (incentive spirometer) to encourage you to breathe deeply.   Nausea and vomiting   You may feel sick to your stomach after surgery; if so, let your nurse know.    Pain control:  After surgery, you may have pain. Our goal is to help you manage your pain. Pain medicine will help you feel comfortable enough to do activities that will help you heal.  These activities may include breathing exercises, walking and physical therapy.   To help your health care team treat your pain we will ask: 1) If you have pain  2) where it is located 3) describe your pain in your words  Methods of pain control include medications given by mouth, vein or by nerve block for some surgeries.  We may give you a pain control pump that will:  1) Deliver the medicine through a tube placed in your vein  2) Control the amount of medicine you receive  3) Allow you to push a button to deliver a dose of pain medicine  Sequential Compression Device (SCD) or Pneumo Boots:  You may need to wear SCD S on your legs or feet. These are wraps connected to a machine that pumps in air and releases it. The repeated pumping helps prevent blood clots from forming.

## 2017-08-29 NOTE — NURSING NOTE
Chief Complaint   Patient presents with     Blood Draw     Labs collected from venipuncture by RN.     Labs collected from venipuncture by RN.    Lashay Holm RN

## 2017-08-30 ENCOUNTER — TELEPHONE (OUTPATIENT)
Dept: ONCOLOGY | Facility: CLINIC | Age: 67
End: 2017-08-30

## 2017-08-30 NOTE — TELEPHONE ENCOUNTER
----- Message from Melody Bolivar MD sent at 8/29/2017  1:49 PM CDT -----  I told Helga 7 days    Melody Bolivar MD  Gynecologic Oncology  [facility]    ----- Message -----     From: Helga Byers NP     Sent: 8/29/2017   1:18 PM       To: Melody Bolivar MD    Hello Dr. Bolivar!    I saw Michela Cole in the [facility] in preparation for Laparoscopic Bilateral SOPHIE-BSO, Possible Cancer Staging and Tumor Debulking, on 9/11/17.  The patient is on both Aspirin and Effient due to her history of significant coronary artery disease with stent placement.      Before I contact her Cardiologist regarding holding these medications, what is the minimum amount of time you will need the Aspirin and Effient to be held prior to surgery?    Thank you!  JOSE Moya, CNP  [facility]

## 2017-08-30 NOTE — TELEPHONE ENCOUNTER
RN called patient to follow up on instructions for Aspirin and Effient prior to surgery. Patient unavailable. Voicemail with call back number left.     Umu Mitchell RN

## 2017-08-31 ENCOUNTER — TRANSFERRED RECORDS (OUTPATIENT)
Dept: HEALTH INFORMATION MANAGEMENT | Facility: CLINIC | Age: 67
End: 2017-08-31

## 2017-08-31 DIAGNOSIS — F41.9 ANXIETY: ICD-10-CM

## 2017-09-01 NOTE — TELEPHONE ENCOUNTER
Routing refill request to provider for review/approval because:  Melanie given x1 and patient did not follow up, please advise      Yecenia Weaver RN - BC

## 2017-09-01 NOTE — TELEPHONE ENCOUNTER
MA - please call patient and set up appointment then reroute back to rn refill pool.    Yecenia Weaver RN - BC

## 2017-09-01 NOTE — TELEPHONE ENCOUNTER
buPROPion (WELLBUTRIN SR) 200 MG 12 hr tablet       Last Written Prescription Date: 8/4/17  Last Fill Quantity: 60; # refills: 0  Last Office Visit with [facility], [facility] or [facility] prescribing provider:  4/3/17        Last PHQ-9 score on record=   PHQ-9 SCORE 8/4/2017   Total Score -   Total Score 7       Lab Results   Component Value Date    AST 20 08/29/2017     Lab Results   Component Value Date    ALT 25 08/29/2017

## 2017-09-06 ENCOUNTER — TELEPHONE (OUTPATIENT)
Dept: ONCOLOGY | Facility: CLINIC | Age: 67
End: 2017-09-06

## 2017-09-06 NOTE — TELEPHONE ENCOUNTER
RN attempted to reach patient to discuss medication usage before surgery. RN unable to reach patient. Voicemail and call back number left.     Umu Mitchell RN

## 2017-09-06 NOTE — TELEPHONE ENCOUNTER
Left detailed message on patients cell number per patients chart in regards to needing appointment before getting refills as we received another refill request for this med from the pharmacy. Advised patient to call back.    Maureen Jaramillo CMA

## 2017-09-06 NOTE — TELEPHONE ENCOUNTER
Called patient and left detailed VM to set up appointment.  Umu CAMPOVERDE CMA ([facility])

## 2017-09-07 ENCOUNTER — TELEPHONE (OUTPATIENT)
Dept: ONCOLOGY | Facility: CLINIC | Age: 67
End: 2017-09-07

## 2017-09-07 NOTE — TELEPHONE ENCOUNTER
Reason for Call:  Other prescription    Detailed comments:  Patient calling. She made an appointment to be seen. She is having an issue with her insurance coverage. Can she get a one month refill until then. Please call and advise.     Phone Number Patient can be reached at: Cell number on file:    Telephone Information:   Mobile [phone]       Best Time:  Any     Can we leave a detailed message on this number? YES    Call taken on 9/7/2017 at 3:23 PM by Elida Vallecillo

## 2017-09-07 NOTE — TELEPHONE ENCOUNTER
Patient called clinic and stated that she has lost her insurance. She will have insurance that is re-instated. However, they will only cover 80% and she will have to cover 20%. Patient states that she cant afford this and she is very sad. She was tearful and stated that she called around for supplemental insurance coverage but that will be more money than she can pay monthly.     RN gave the financial counselor number to the patient. Patient will call them to discuss options before canceling surgery.     Patient will call RN once she has spoken with financial counselors.    Umu Mitchell RN

## 2017-09-07 NOTE — TELEPHONE ENCOUNTER
Patient left RN a voicemail. Patient stated she has stopped the aspirin and Effient as instructed.     Patient appreciative of the reminder.     Umu Mitchell RN

## 2017-09-08 RX ORDER — BUPROPION HYDROCHLORIDE 200 MG/1
TABLET, EXTENDED RELEASE ORAL
Qty: 60 TABLET | Refills: 0 | Status: SHIPPED | OUTPATIENT
Start: 2017-09-08 | End: 2017-10-06

## 2017-09-08 NOTE — TELEPHONE ENCOUNTER
Patient called RN and stated that she did speak with financial counseling. She stated at this time she needs to cancel surgery until she is able to get insurance worked out.     RN relayed message to surgery scheduler and MD. Umu Mitchell RN

## 2017-09-22 ENCOUNTER — TELEPHONE (OUTPATIENT)
Dept: ONCOLOGY | Facility: CLINIC | Age: 67
End: 2017-09-22

## 2017-09-22 NOTE — TELEPHONE ENCOUNTER
RN called patient to follow up on cancelled surgery and insurance issues. Patient unavailable. Voicemail and call back number left.    Umu Mitchell RN

## 2017-10-06 DIAGNOSIS — F41.9 ANXIETY: ICD-10-CM

## 2017-10-06 RX ORDER — BUPROPION HYDROCHLORIDE 200 MG/1
TABLET, EXTENDED RELEASE ORAL
Qty: 60 TABLET | Refills: 0 | Status: CANCELLED | OUTPATIENT
Start: 2017-10-06

## 2017-10-06 NOTE — TELEPHONE ENCOUNTER
MA - call patient and schedule a follow up appointment. Once schedule reroute to RN refill pool.    Yecenia Weaver RN - BC

## 2017-10-06 NOTE — TELEPHONE ENCOUNTER
buPROPion (WELLBUTRIN SR) 200 MG 12 hr tablet     Last Written Prescription Date: 9-8-17  Last Fill Quantity: 60; # refills: 0  Last Office Visit with [facility], P or [facility] prescribing provider:  4-3-17        Last PHQ-9 score on record=   PHQ-9 SCORE 8/4/2017   Total Score -   Total Score 7       Lab Results   Component Value Date    AST 20 08/29/2017     Lab Results   Component Value Date    ALT 25 08/29/2017

## 2017-10-06 NOTE — TELEPHONE ENCOUNTER
buPROPion (WELLBUTRIN SR) 200 MG 12 hr tablet      Last Written Prescription Date: 09/08/2017  Last Fill Quantity: 60; # refills: 0  Last Office Visit with [facility], [facility] or [facility] prescribing provider:  09/08/2017        Last PHQ-9 score on record=   PHQ-9 SCORE 8/4/2017   Total Score -   Total Score 7       Lab Results   Component Value Date    AST 20 08/29/2017     Lab Results   Component Value Date    ALT 25 08/29/2017     Mari Su MA

## 2017-10-06 NOTE — TELEPHONE ENCOUNTER
Routing refill request to provider for review/approval because:  Patient needs to be seen because:  Was given a 1x refill and advised to f/u with PCP but was a no show at last appt (9/8/2017)    Jeanie Borges RN

## 2017-10-06 NOTE — TELEPHONE ENCOUNTER
Routing refill request to provider for review/approval because:  Patient needs to be seen because:  Patient was given a 1x refill and advised to f/u but was a no show on last appt. (9/8/2017)    Jeanie Borges RN

## 2017-10-09 RX ORDER — BUPROPION HYDROCHLORIDE 200 MG/1
TABLET, EXTENDED RELEASE ORAL
Qty: 60 TABLET | Refills: 0 | Status: SHIPPED | OUTPATIENT
Start: 2017-10-09 | End: 2017-11-13

## 2017-10-09 NOTE — TELEPHONE ENCOUNTER
Spoke to patient and informed her of below message. Patient states she knows she is due for appointment, but she needs to wait to make an appointment until her Supplemental insurance kicks in so she doesn't have to pay for 20 percent of the visit cost. She will make an appointment as soon as the insurance goes through.  Umu CAMPOVERDE CMA ([facility])

## 2017-10-23 DIAGNOSIS — F41.9 ANXIETY: ICD-10-CM

## 2017-10-25 RX ORDER — BUPROPION HYDROCHLORIDE 200 MG/1
TABLET, EXTENDED RELEASE ORAL
Qty: 60 TABLET | Refills: 0 | OUTPATIENT
Start: 2017-10-25

## 2017-10-25 NOTE — TELEPHONE ENCOUNTER
Spoke to patient and informed her of below message. Patient states she still is waiting for her insurance supplement to kick in and does not want to make an appointment yet. She stated she has been trying to get this taken care of. Her father injured himself and needs around the clock care during his recovery. She will be gone for two weeks and just wants to make sure she has her medication.   Umu CAMPOVERDE CMA ([facility])

## 2017-10-25 NOTE — TELEPHONE ENCOUNTER
Pt needs appt for further refills. Please schedule.  PHQ-9 SCORE 7/21/2016 1/2/2017 8/4/2017   Total Score - - -   Total Score 3 4 7

## 2017-11-13 ENCOUNTER — OFFICE VISIT (OUTPATIENT)
Dept: FAMILY MEDICINE | Facility: CLINIC | Age: 67
End: 2017-11-13
Payer: COMMERCIAL

## 2017-11-13 VITALS
SYSTOLIC BLOOD PRESSURE: 118 MMHG | OXYGEN SATURATION: 97 % | DIASTOLIC BLOOD PRESSURE: 64 MMHG | BODY MASS INDEX: 24.89 KG/M2 | HEART RATE: 83 BPM | WEIGHT: 127 LBS | TEMPERATURE: 98.1 F

## 2017-11-13 DIAGNOSIS — F33.1 MODERATE EPISODE OF RECURRENT MAJOR DEPRESSIVE DISORDER (H): ICD-10-CM

## 2017-11-13 DIAGNOSIS — E78.5 HYPERLIPIDEMIA LDL GOAL <100: ICD-10-CM

## 2017-11-13 DIAGNOSIS — J44.9 CHRONIC OBSTRUCTIVE PULMONARY DISEASE, UNSPECIFIED COPD TYPE (H): ICD-10-CM

## 2017-11-13 DIAGNOSIS — I25.10 CORONARY ARTERY DISEASE INVOLVING NATIVE CORONARY ARTERY OF NATIVE HEART WITHOUT ANGINA PECTORIS: Primary | ICD-10-CM

## 2017-11-13 DIAGNOSIS — Z23 NEED FOR PROPHYLACTIC VACCINATION AGAINST STREPTOCOCCUS PNEUMONIAE (PNEUMOCOCCUS): ICD-10-CM

## 2017-11-13 DIAGNOSIS — Z91.81 AT RISK FOR FALLING: ICD-10-CM

## 2017-11-13 PROCEDURE — 90670 PCV13 VACCINE IM: CPT | Performed by: NURSE PRACTITIONER

## 2017-11-13 PROCEDURE — 99214 OFFICE O/P EST MOD 30 MIN: CPT | Mod: 25 | Performed by: NURSE PRACTITIONER

## 2017-11-13 PROCEDURE — G0009 ADMIN PNEUMOCOCCAL VACCINE: HCPCS | Performed by: NURSE PRACTITIONER

## 2017-11-13 RX ORDER — TICAGRELOR 60 MG/1
60 TABLET ORAL DAILY
Refills: 11 | COMMUNITY
Start: 2017-10-26 | End: 2018-11-09

## 2017-11-13 RX ORDER — LORAZEPAM 0.5 MG/1
TABLET ORAL
Qty: 1 TABLET | Refills: 0 | Status: CANCELLED | OUTPATIENT
Start: 2017-11-13

## 2017-11-13 RX ORDER — ATORVASTATIN CALCIUM 40 MG/1
40 TABLET, FILM COATED ORAL AT BEDTIME
Qty: 90 TABLET | Refills: 1 | Status: SHIPPED | OUTPATIENT
Start: 2017-11-13 | End: 2018-03-13

## 2017-11-13 RX ORDER — MECLIZINE HYDROCHLORIDE 25 MG/1
25 TABLET ORAL EVERY 6 HOURS PRN
Qty: 30 TABLET | Refills: 1 | Status: CANCELLED | OUTPATIENT
Start: 2017-11-13

## 2017-11-13 RX ORDER — ESCITALOPRAM OXALATE 10 MG/1
10 TABLET ORAL DAILY
Qty: 30 TABLET | Refills: 1 | Status: SHIPPED | OUTPATIENT
Start: 2017-11-13 | End: 2018-02-13

## 2017-11-13 RX ORDER — BUPROPION HYDROCHLORIDE 200 MG/1
TABLET, EXTENDED RELEASE ORAL
Qty: 180 TABLET | Refills: 1 | Status: SHIPPED | OUTPATIENT
Start: 2017-11-13 | End: 2018-05-31

## 2017-11-13 RX ORDER — IPRATROPIUM BROMIDE AND ALBUTEROL SULFATE 2.5; .5 MG/3ML; MG/3ML
3 SOLUTION RESPIRATORY (INHALATION) EVERY 6 HOURS PRN
Qty: 30 VIAL | Refills: 2 | Status: SHIPPED | OUTPATIENT
Start: 2017-11-13 | End: 2017-12-13

## 2017-11-13 RX ORDER — ALBUTEROL SULFATE 90 UG/1
2 AEROSOL, METERED RESPIRATORY (INHALATION) EVERY 6 HOURS PRN
Qty: 2 INHALER | Refills: 2 | Status: SHIPPED | OUTPATIENT
Start: 2017-11-13 | End: 2019-07-25

## 2017-11-13 RX ORDER — ISOSORBIDE MONONITRATE 30 MG/1
60 TABLET, EXTENDED RELEASE ORAL DAILY
Qty: 180 TABLET | Refills: 1 | Status: SHIPPED | OUTPATIENT
Start: 2017-11-13 | End: 2018-03-13

## 2017-11-13 ASSESSMENT — PAIN SCALES - GENERAL: PAINLEVEL: SEVERE PAIN (6)

## 2017-11-13 ASSESSMENT — ANXIETY QUESTIONNAIRES
7. FEELING AFRAID AS IF SOMETHING AWFUL MIGHT HAPPEN: MORE THAN HALF THE DAYS
6. BECOMING EASILY ANNOYED OR IRRITABLE: NEARLY EVERY DAY
3. WORRYING TOO MUCH ABOUT DIFFERENT THINGS: NEARLY EVERY DAY
IF YOU CHECKED OFF ANY PROBLEMS ON THIS QUESTIONNAIRE, HOW DIFFICULT HAVE THESE PROBLEMS MADE IT FOR YOU TO DO YOUR WORK, TAKE CARE OF THINGS AT HOME, OR GET ALONG WITH OTHER PEOPLE: EXTREMELY DIFFICULT
2. NOT BEING ABLE TO STOP OR CONTROL WORRYING: NEARLY EVERY DAY
GAD7 TOTAL SCORE: 19
1. FEELING NERVOUS, ANXIOUS, OR ON EDGE: NEARLY EVERY DAY
5. BEING SO RESTLESS THAT IT IS HARD TO SIT STILL: MORE THAN HALF THE DAYS

## 2017-11-13 ASSESSMENT — PATIENT HEALTH QUESTIONNAIRE - PHQ9
SUM OF ALL RESPONSES TO PHQ QUESTIONS 1-9: 24
5. POOR APPETITE OR OVEREATING: NEARLY EVERY DAY

## 2017-11-13 NOTE — NURSING NOTE
"Chief Complaint   Patient presents with     Depression     Refill on meds. Due for DAP and Fall Risk.       Initial /64 (BP Location: Left arm, Cuff Size: Adult Regular)  Pulse 83  Temp 98.1  F (36.7  C) (Oral)  Wt 127 lb (57.6 kg)  SpO2 97%  Breastfeeding? No  BMI 24.89 kg/m2 Estimated body mass index is 24.89 kg/(m^2) as calculated from the following:    Height as of 8/29/17: 4' 11.9\" (1.521 m).    Weight as of this encounter: 127 lb (57.6 kg).  Medication Reconciliation: complete       Maureen Jaramillo CMA      "

## 2017-11-13 NOTE — PROGRESS NOTES
SUBJECTIVE:   Michela Cole is a 67 year old female who presents to clinic today for the following health issues:      Medication Followup of Wellbutrin    Taking Medication as prescribed: yes    Side Effects:  None    Medication Helping Symptoms:  NO     Patient notes a lot of recent stress with insurance issues and health issues with her father.  She ran out of her wellbutrin 200 mg BID several days.  She notes that she has been struggling for some time with depression.  Patient has thoughts that it would be easier to not wake up sometimes, but denies suicidal plan.  Patient also notes she has a son who is an addict and he lives with her.  She has attended counseling in the past, but does not feel it helped.  She feels safe living with her son, but it causes her a lot of anxiety and depression.  She was previously on prozac, but stopped it due to sexual side effects.  She is having difficulty concentrating due to depressed mood and complains of anhedonia as well.    Hyperlipidemia Follow-Up      Rate your low fat/cholesterol diet?: good    Taking statin?  Yes, no muscle aches from statin    Other lipid medications/supplements?:  none    Patient feels her COPD has worsened slightly.  She wants to start trying nebulizer treatments again with duo nebs to see if helps.  She was previously on spiriva, but cannot afford medication.    Patient denies any chest pain and continues on imdur.  She had recent stable follow-up with cardiology.      Problem list and histories reviewed & adjusted, as indicated.  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     Stress     Restless legs syndrome (RLS)     CAD  "(coronary artery disease)     Moderate episode of recurrent major depressive disorder (H)     Past Surgical History:   Procedure Laterality Date     APPENDECTOMY       C/SECTION, LOW TRANSVERSE      x 4     CORONARY ANGIOGRAPHY ADULT ORDER      Total of 5 coronary angiograms     SPLENECTOMY  1978    ITP     TUBAL LIGATION      x2       Social History   Substance Use Topics     Smoking status: Former Smoker     Packs/day: 0.25     Years: 42.00     Types: Cigarettes     Quit date: 6/24/2013     Smokeless tobacco: Never Used     Alcohol use 0.0 oz/week     0 Standard drinks or equivalent per week      Comment: A couple times a week, \"three beers max\"     Family History   Problem Relation Age of Onset     Cervical Cancer Mother      Lymph nodes were also involved, no chemotherapy needed.       DIABETES Father      Hyperlipidemia Father      HEART DISEASE Maternal Grandfather      HEART DISEASE Paternal Grandmother      HEART DISEASE Paternal Grandfather      Glaucoma No family hx of      Macular Degeneration No family hx of      Hypertension No family hx of      CEREBROVASCULAR DISEASE No family hx of      Thyroid Disease No family hx of          Current Outpatient Prescriptions   Medication Sig Dispense Refill     BRILINTA 60 MG tablet Take 60 mg by mouth daily  11     buPROPion (WELLBUTRIN SR) 200 MG 12 hr tablet TAKE ONE TABLET BY MOUTH TWICE DAILY. 180 tablet 1     isosorbide mononitrate (IMDUR) 30 MG 24 hr tablet Take 2 tablets (60 mg) by mouth daily 180 tablet 1     albuterol (PROAIR HFA/PROVENTIL HFA/VENTOLIN HFA) 108 (90 BASE) MCG/ACT Inhaler Inhale 2 puffs into the lungs every 6 hours as needed for shortness of breath / dyspnea 2 Inhaler 2     atorvastatin (LIPITOR) 40 MG tablet Take 1 tablet (40 mg) by mouth At Bedtime 90 tablet 1     ipratropium - albuterol 0.5 mg/2.5 mg/3 mL (DUONEB) 0.5-2.5 (3) MG/3ML neb solution Take 1 vial (3 mLs) by nebulization every 6 hours as needed for shortness of breath / dyspnea " 30 vial 2     escitalopram (LEXAPRO) 10 MG tablet Take 1 tablet (10 mg) by mouth daily 30 tablet 1     LORazepam (ATIVAN) 0.5 MG tablet TAKE 1/2-1 TABLET BY MOUTH EVERY 8 HOURS AS NEEDED FOR ANXIETY. DO NOT OPERATE A VEHICLE AFTER TAKING THIS MEDICATION 1 tablet 0     pantoprazole (PROTONIX) 40 MG EC tablet TAKE 1 TABLET BY MOUTH 2 TIMES DAILY. TAKE BY MOUTH 30-60 MINUTES BEFORE A MEAL 180 tablet 2     levothyroxine (SYNTHROID/LEVOTHROID) 75 MCG tablet Take 1 tablet (75 mcg) by mouth daily (Patient taking differently: Take 75 mcg by mouth every morning ) 90 tablet 3     metoprolol (TOPROL-XL) 50 MG 24 hr tablet Take 1 tablet (50 mg) by mouth daily (Patient taking differently: Take 50 mg by mouth every morning ) 90 tablet 3     nitroglycerin (NITROSTAT) 0.4 MG sublingual tablet Place 1 tablet (0.4 mg) under the tongue every 5 minutes as needed for chest pain 25 tablet 11     meclizine (ANTIVERT) 25 MG tablet Take 1 tablet (25 mg) by mouth every 6 hours as needed for dizziness 30 tablet 1     order for DME Equipment being ordered: incentive spirometry 1 Device 0     aspirin EC 81 MG tablet Take 81 mg by mouth every morning        [DISCONTINUED] buPROPion (WELLBUTRIN SR) 200 MG 12 hr tablet TAKE ONE TABLET BY MOUTH TWICE DAILY. PT DUE FOR OFFICE VISIT FOR FURTHER REFILLS 60 tablet 0     [DISCONTINUED] isosorbide mononitrate (IMDUR) 30 MG 24 hr tablet Take 2 tablets (60 mg) by mouth daily (Patient taking differently: Take 60 mg by mouth every morning ) 180 tablet 1     diclofenac (VOLTAREN) 1 % GEL topical gel Apply  2 grams to hands four times daily using enclosed dosing card. (Patient not taking: Reported on 11/13/2017) 100 g 0     [DISCONTINUED] albuterol (PROAIR HFA/PROVENTIL HFA/VENTOLIN HFA) 108 (90 BASE) MCG/ACT Inhaler Inhale 2 puffs into the lungs every 6 hours as needed for shortness of breath / dyspnea 2 Inhaler 2     [DISCONTINUED] atorvastatin (LIPITOR) 40 MG tablet Take 40 mg by mouth At Bedtime         [DISCONTINUED] ipratropium - albuterol 0.5 mg/2.5 mg/3 mL (DUONEB) 0.5-2.5 (3) MG/3ML nebulization Take 3 mLs by nebulization every 6 hours as needed for shortness of breath / dyspnea 30 vial 2     Allergies   Allergen Reactions     Sulfa Drugs Rash and Hives     Plavix [Clopidogrel] Other (See Comments)     Other reaction(s): Other - Describe In Comment Field  Not effective at preventing clots  P2Y12 testing done at [facility] in 12/2014 indicates a non responder to Plavix     Sulfasalazine Rash     BP Readings from Last 3 Encounters:   11/13/17 118/64   08/29/17 112/73   08/29/17 112/73    Wt Readings from Last 3 Encounters:   11/13/17 127 lb (57.6 kg)   08/29/17 126 lb 5.2 oz (57.3 kg)   08/29/17 126 lb 6.4 oz (57.3 kg)                  Labs reviewed in EPIC        Reviewed and updated as needed this visit by clinical staff     Reviewed and updated as needed this visit by Provider         ROS:  Constitutional, HEENT, cardiovascular, pulmonary, gi and gu systems are negative, except as otherwise noted.      OBJECTIVE:   /64 (BP Location: Left arm, Cuff Size: Adult Regular)  Pulse 83  Temp 98.1  F (36.7  C) (Oral)  Wt 127 lb (57.6 kg)  SpO2 97%  Breastfeeding? No  BMI 24.89 kg/m2  Body mass index is 24.89 kg/(m^2).  GENERAL: healthy, alert and no distress  RESP: lungs clear to auscultation - no rales, rhonchi or wheezes  CV: regular rate and rhythm, normal S1 S2, no S3 or S4, no murmur, click or rub, no peripheral edema and peripheral pulses strong  MS: no gross musculoskeletal defects noted, no edema    Diagnostic Test Results:  none     ASSESSMENT/PLAN:     1. Coronary artery disease involving native coronary artery of native heart without angina pectoris  Stable.  Continue current treatment plan and medications.    - isosorbide mononitrate (IMDUR) 30 MG 24 hr tablet; Take 2 tablets (60 mg) by mouth daily  Dispense: 180 tablet; Refill: 1    2. Chronic obstructive pulmonary disease, unspecified COPD type  (H)  Patient to resume duonebs.  If not improving, consider addition of LABA  - albuterol (PROAIR HFA/PROVENTIL HFA/VENTOLIN HFA) 108 (90 BASE) MCG/ACT Inhaler; Inhale 2 puffs into the lungs every 6 hours as needed for shortness of breath / dyspnea  Dispense: 2 Inhaler; Refill: 2  - ipratropium - albuterol 0.5 mg/2.5 mg/3 mL (DUONEB) 0.5-2.5 (3) MG/3ML neb solution; Take 1 vial (3 mLs) by nebulization every 6 hours as needed for shortness of breath / dyspnea  Dispense: 30 vial; Refill: 2    3. Moderate episode of recurrent major depressive disorder (H)  Patient to add low dose lexapro to see if this might improve her symptoms.  - buPROPion (WELLBUTRIN SR) 200 MG 12 hr tablet; TAKE ONE TABLET BY MOUTH TWICE DAILY.  Dispense: 180 tablet; Refill: 1  - escitalopram (LEXAPRO) 10 MG tablet; Take 1 tablet (10 mg) by mouth daily  Dispense: 30 tablet; Refill: 1    4. At risk for falling      5. Hyperlipidemia LDL goal <100  Stable.  Continue current treatment plan and medications.    - atorvastatin (LIPITOR) 40 MG tablet; Take 1 tablet (40 mg) by mouth At Bedtime  Dispense: 90 tablet; Refill: 1    6. Need for prophylactic vaccination against Streptococcus pneumoniae (pneumococcus)    - Pneumococcal vaccine 13 valent PCV13 IM (Prevnar) [34153]  - ADMIN: Vaccine, Initial (42692)    FUTURE APPOINTMENTS:       - Follow-up visit in 2-4 weeks.    JOSE Ramsey [facility]

## 2017-11-13 NOTE — MR AVS SNAPSHOT
After Visit Summary   11/13/2017    Michela Cole    MRN: [id number]           Patient Information     Date Of Birth          1950        Visit Information        Provider Department      11/13/2017 1:40 PM Swapna Edwards APRN [facility]        Today's Diagnoses     Coronary artery disease involving native coronary artery of native heart without angina pectoris    -  1    Chronic obstructive pulmonary disease, unspecified COPD type (H)        Moderate episode of recurrent major depressive disorder (H)        At risk for falling        Hyperlipidemia LDL goal <100        Need for prophylactic vaccination against Streptococcus pneumoniae (pneumococcus)          Care Instructions    Start lexapro 10 mg daily.  Follow-up in 2-4 weeks.    [facility]    If you have any questions regarding to your visit please contact your care team:     Team Pink:   Clinic Hours Telephone Number   Internal Medicine:  Dr. Frannie Edwards, NP       7am-7pm  Monday - Thursday   7am-5pm  Fridays  [phone]  (Appointment scheduling available 24/7)    Questions about your visit?  Team Line  [phone]   Urgent Care - [place] [facility] - 11am-9pm Monday-Friday Saturday-Sunday- 9am-5pm   [place] - 5pm-9pm Monday-Friday Saturday-Sunday- 9am-5pm  [phone] - [place]   [phone] - [place]       What options do I have for visits at the clinic other than the traditional office visit?  To expand how we care for you, many of our providers are utilizing electronic visits (e-visits) and telephone visits, when medically appropriate, for interactions with their patients rather than a visit in the clinic.   We also offer nurse visits for many medical concerns. Just like any other service, we will bill your insurance company for this type of visit based on time spent on the phone with your provider. Not all insurance  "companies cover these visits. Please check with your medical insurance if this type of visit is covered. You will be responsible for any charges that are not paid by your insurance.      E-visits via [employer]:  generally incur a $35.00 fee.  Telephone visits:  Time spent on the phone: *charged based on time that is spent on the phone in increments of 10 minutes. Estimated cost:   5-10 mins $30.00   11-20 mins. $59.00   21-30 mins. $85.00   Use [employer] [facility] [facility] (secure email communication and access to your chart) to send your primary care provider a message or make an appointment. Ask someone on your Team how to sign up for [employer] [facility].    For a Price Quote for your services, please call our [employer] Line at [phone].    As always, Thank you for trusting us with your health care needs!    Maureen Jaramillo, KATHIA          Follow-ups after your visit        Who to contact     If you have questions or need follow up information about today's clinic visit or your schedule please contact [facility] directly at [phone].  Normal or non-critical lab and imaging results will be communicated to you by [employer], letter or phone within 4 business days after the clinic has received the results. If you do not hear from us within 7 days, please contact the clinic through [employer] or phone. If you have a critical or abnormal lab result, we will notify you by phone as soon as possible.  Submit refill requests through [facility] or call your pharmacy and they will forward the refill request to us. Please allow 3 business days for your refill to be completed.          Additional Information About Your Visit        [facility] Information     [facility] lets you send messages to your doctor, view your test results, renew your prescriptions, schedule appointments and more. To sign up, go to [email or web address] . Click on \"Log in\" on the left side of the screen, which will take you to the Welcome page. Then click on \"Sign up Now\" on the " right side of the page.     You will be asked to enter the access code listed below, as well as some personal information. Please follow the directions to create your username and password.     Your access code is: [email or web address]  Expires: 2018  2:16 PM     Your access code will  in 90 days. If you need help or a new code, please call your [facility] clinic or [phone].        Care EveryWhere ID     This is your Care EveryWhere ID. This could be used by other organizations to access your [facility] medical records  [id number]        Your Vitals Were     Pulse Temperature Pulse Oximetry Breastfeeding? BMI (Body Mass Index)       83 98.1  F (36.7  C) (Oral) 97% No 24.89 kg/m2        Blood Pressure from Last 3 Encounters:   17 118/64   17 112/73   17 112/73    Weight from Last 3 Encounters:   17 127 lb (57.6 kg)   17 126 lb 5.2 oz (57.3 kg)   17 126 lb 6.4 oz (57.3 kg)              We Performed the Following     ADMIN: Vaccine, Initial (75860)     Pneumococcal vaccine 13 valent PCV13 IM (Prevnar) [13824]          Today's Medication Changes          These changes are accurate as of: 17  2:16 PM.  If you have any questions, ask your nurse or doctor.               Start taking these medicines.        Dose/Directions    escitalopram 10 MG tablet   Commonly known as:  LEXAPRO   Used for:  Moderate episode of recurrent major depressive disorder (H)   Started by:  Swapna Edwards APRN CNP        Dose:  10 mg   Take 1 tablet (10 mg) by mouth daily   Quantity:  30 tablet   Refills:  1         These medicines have changed or have updated prescriptions.        Dose/Directions    buPROPion 200 MG 12 hr tablet   Commonly known as:  WELLBUTRIN SR   This may have changed:  See the new instructions.   Used for:  Coronary artery disease involving native coronary artery of native heart without angina pectoris   Changed by:  Swapna Edwards APRN CNP        TAKE ONE  TABLET BY MOUTH TWICE DAILY.   Quantity:  180 tablet   Refills:  1       isosorbide mononitrate 30 MG 24 hr tablet   Commonly known as:  IMDUR   This may have changed:  when to take this   Used for:  Coronary artery disease involving native coronary artery of native heart without angina pectoris        Dose:  60 mg   Take 2 tablets (60 mg) by mouth daily   Quantity:  180 tablet   Refills:  1       levothyroxine 75 MCG tablet   Commonly known as:  SYNTHROID/LEVOTHROID   This may have changed:  when to take this   Used for:  Hypothyroidism, unspecified type        Dose:  75 mcg   Take 1 tablet (75 mcg) by mouth daily   Quantity:  90 tablet   Refills:  3       metoprolol 50 MG 24 hr tablet   Commonly known as:  TOPROL-XL   This may have changed:  when to take this   Used for:  Coronary artery disease involving native coronary artery of native heart without angina pectoris        Dose:  50 mg   Take 1 tablet (50 mg) by mouth daily   Quantity:  90 tablet   Refills:  3            Where to get your medicines      These medications were sent to [id number] - [place], [place] - [address]  [address] [place] [address]     Phone:  [phone]     albuterol 108 (90 BASE) MCG/ACT Inhaler    atorvastatin 40 MG tablet    buPROPion 200 MG 12 hr tablet    escitalopram 10 MG tablet    ipratropium - albuterol 0.5 mg/2.5 mg/3 mL 0.5-2.5 (3) MG/3ML neb solution    isosorbide mononitrate 30 MG 24 hr tablet                Primary Care Provider Office Phone # Fax #    Frannie Allen [phone]       [address] [address]        Goals        General    Functional (pt-stated)     Notes - Note created  9/1/2015 11:20 AM by Janneth Orellana RN    I will rinse my mouth out after using my inhaler to help keep the lining of my mouth healthy  As of today's date 9/1/2015 goal is met at 0 - 25%.   Goal Status:  Ongoing      Medication (pt-stated)     Notes - Note edited  9/1/2015  2:10 PM  by Janneth Orellana RN    I will use my inhalers as instructed  As of today's date 9/1/2015 goal is met at 0 - 25%.   Goal Status:  Activeedule  Appointment with ENT       Psychosocial (pt-stated)     Notes - Note edited  9/1/2015  2:10 PM by Janneth Orellana RN    I will  Investigate counseling and/or support groups if and when I am ready to get formal support  As of today's date 9/1/2015 goal is met at 0 - 25%.   Goal Status:  Ongoing        Equal Access to Services     ALEXANDER RODRIGUEZ : Hadii aad ku hadasho Soomaali, waaxda luqadaha, qaybta kaalmada adeegyada, waxay dennisin elena leblanc . So [facility] [phone].    ATENCIÓN: Si habla español, tiene a de la rosa disposición servicios gratuitos de asistencia lingüística. Llame al [phone].    We comply with applicable federal civil rights laws and [place] laws. We do not discriminate on the basis of race, color, national origin, age, disability, sex, sexual orientation, or gender identity.            Thank you!     Thank you for choosing [facility] [place]  for your care. Our goal is always to provide you with excellent care. Hearing back from our patients is one way we can continue to improve our services. Please take a few minutes to complete the written survey that you may receive in the mail after your visit with us. Thank you!             Your Updated Medication List - Protect others around you: Learn how to safely use, store and throw away your medicines at www.disposemymeds.org.          This list is accurate as of: 11/13/17  2:16 PM.  Always use your most recent med list.                   Brand Name Dispense Instructions for use Diagnosis    albuterol 108 (90 BASE) MCG/ACT Inhaler    PROAIR HFA/PROVENTIL HFA/VENTOLIN HFA    2 Inhaler    Inhale 2 puffs into the lungs every 6 hours as needed for shortness of breath / dyspnea    Chronic obstructive pulmonary disease, unspecified COPD type (H)       aspirin EC 81 MG EC tablet      Take 81 mg by  mouth every morning        atorvastatin 40 MG tablet    LIPITOR    90 tablet    Take 1 tablet (40 mg) by mouth At Bedtime    Hyperlipidemia LDL goal <100       BRILINTA 60 MG tablet   Generic drug:  ticagrelor      Take 60 mg by mouth daily        buPROPion 200 MG 12 hr tablet    WELLBUTRIN SR    180 tablet    TAKE ONE TABLET BY MOUTH TWICE DAILY.    Coronary artery disease involving native coronary artery of native heart without angina pectoris       diclofenac 1 % Gel topical gel    VOLTAREN    100 g    Apply  2 grams to hands four times daily using enclosed dosing card.    Primary osteoarthritis of both hands       escitalopram 10 MG tablet    LEXAPRO    30 tablet    Take 1 tablet (10 mg) by mouth daily    Moderate episode of recurrent major depressive disorder (H)       ipratropium - albuterol 0.5 mg/2.5 mg/3 mL 0.5-2.5 (3) MG/3ML neb solution    DUONEB    30 vial    Take 1 vial (3 mLs) by nebulization every 6 hours as needed for shortness of breath / dyspnea    Chronic obstructive pulmonary disease, unspecified COPD type (H)       isosorbide mononitrate 30 MG 24 hr tablet    IMDUR    180 tablet    Take 2 tablets (60 mg) by mouth daily    Coronary artery disease involving native coronary artery of native heart without angina pectoris       levothyroxine 75 MCG tablet    SYNTHROID/LEVOTHROID    90 tablet    Take 1 tablet (75 mcg) by mouth daily    Hypothyroidism, unspecified type       LORazepam 0.5 MG tablet    ATIVAN    1 tablet    TAKE 1/2-1 TABLET BY MOUTH EVERY 8 HOURS AS NEEDED FOR ANXIETY. DO NOT OPERATE A VEHICLE AFTER TAKING THIS MEDICATION    Anxiety       meclizine 25 MG tablet    ANTIVERT    30 tablet    Take 1 tablet (25 mg) by mouth every 6 hours as needed for dizziness    BPV (benign positional vertigo), left       metoprolol 50 MG 24 hr tablet    TOPROL-XL    90 tablet    Take 1 tablet (50 mg) by mouth daily    Coronary artery disease involving native coronary artery of native heart without angina  pectoris       nitroGLYcerin 0.4 MG sublingual tablet    NITROSTAT    25 tablet    Place 1 tablet (0.4 mg) under the tongue every 5 minutes as needed for chest pain    Coronary artery disease involving native coronary artery of native heart without angina pectoris       order for DME     1 Device    Equipment being ordered: incentive spirometry    Pneumonia of right middle lobe due to infectious organism (H)       pantoprazole 40 MG EC tablet    PROTONIX    180 tablet    TAKE 1 TABLET BY MOUTH 2 TIMES DAILY. TAKE BY MOUTH 30-60 MINUTES BEFORE A MEAL    GERD (gastroesophageal reflux disease)

## 2017-11-13 NOTE — PATIENT INSTRUCTIONS
Start lexapro 10 mg daily.  Follow-up in 2-4 weeks.    [facility]    If you have any questions regarding to your visit please contact your care team:     Team Pink:   Clinic Hours Telephone Number   Internal Medicine:  Dr. Frannie Edwards, NP       7am-7pm  Monday - Thursday   7am-5pm  Fridays  [phone]  (Appointment scheduling available 24/7)    Questions about your visit?  Team Line  [phone]   Urgent Care - [place] and [place] - 11am-9pm Monday-Friday Saturday-Sunday- 9am-5pm   [place] - 5pm-9pm Monday-Friday Saturday-Sunday- 9am-5pm  [phone] - [place]   [phone] - [place]       What options do I have for visits at the clinic other than the traditional office visit?  To expand how we care for you, many of our providers are utilizing electronic visits (e-visits) and telephone visits, when medically appropriate, for interactions with their patients rather than a visit in the clinic.   We also offer nurse visits for many medical concerns. Just like any other service, we will bill your insurance company for this type of visit based on time spent on the phone with your provider. Not all insurance companies cover these visits. Please check with your medical insurance if this type of visit is covered. You will be responsible for any charges that are not paid by your insurance.      E-visits via [employer]:  generally incur a $35.00 fee.  Telephone visits:  Time spent on the phone: *charged based on time that is spent on the phone in increments of 10 minutes. Estimated cost:   5-10 mins $30.00   11-20 mins. $59.00   21-30 mins. $85.00   Use [employer] (secure email communication and access to your chart) to send your primary care provider a message or make an appointment. Ask someone on your Team how to sign up for [employer].    For a Price Quote for your services, please call our Consumer Price Line at [phone].    As always, Thank  you for trusting us with your health care needs!    Maureen Jaramillo, CMA

## 2017-11-13 NOTE — LETTER
My Depression Action Plan  Name: Michela Hopkinsiortimothy   Date of Birth 1950  Date: 11/13/2017    My doctor: Frannie Allen   My clinic: [address]  [place] [address]  [phone]          GREEN    ZONE   Good Control    What it looks like:     Things are going generally well. You have normal up s and down s. You may even feel depressed from time to time, but bad moods usually last less than a day.   What you need to do:  1. Continue to care for yourself (see self care plan)  2. Check your depression survival kit and update it as needed  3. Follow your physician s recommendations including any medication.  4. Do not stop taking medication unless you consult with your physician first.           YELLOW         ZONE Getting Worse    What it looks like:     Depression is starting to interfere with your life.     It may be hard to get out of bed; you may be starting to isolate yourself from others.    Symptoms of depression are starting to last most all day and this has happened for several days.     You may have suicidal thoughts but they are not constant.   What you need to do:     1. Call your care team, your response to treatment will improve if you keep your care team informed of your progress. Yellow periods are signs an adjustment may need to be made.     2. Continue your self-care, even if you have to fake it!    3. Talk to someone in your support network    4. Open up your depression survival kit           RED    ZONE Medical Alert - Get Help    What it looks like:     Depression is seriously interfering with your life.     You may experience these or other symptoms: You can t get out of bed most days, can t work or engage in other necessary activities, you have trouble taking care of basic hygiene, or basic responsibilities, thoughts of suicide or death that will not go away, self-injurious behavior.     What you need to do:  1. Call your care team and  request a same-day appointment. If they are not available (weekends or after hours) call your local crisis line, emergency room or 911.      Electronically signed by: Swapna Edwards, November 13, 2017    Depression Self Care Plan / Survival Kit    Self-Care for Depression  Here s the deal. Your body and mind are really not as separate as most people think.  What you do and think affects how you feel and how you feel influences what you do and think. This means if you do things that people who feel good do, it will help you feel better.  Sometimes this is all it takes.  There is also a place for medication and therapy depending on how severe your depression is, so be sure to consult with your medical provider and/ or Behavioral Health Consultant if your symptoms are worsening or not improving.     In order to better manage my stress, I will:    Exercise  Get some form of exercise, every day. This will help reduce pain and release endorphins, the  feel good  chemicals in your brain. This is almost as good as taking antidepressants!  This is not the same as joining a gym and then never going! (they count on that by the way ) It can be as simple as just going for a walk or doing some gardening, anything that will get you moving.      Hygiene   Maintain good hygiene (Get out of bed in the morning, Make your bed, Brush your teeth, Take a shower, and Get dressed like you were going to work, even if you are unemployed).  If your clothes don't fit try to get ones that do.    Diet  I will strive to eat foods that are good for me, drink plenty of water, and avoid excessive sugar, caffeine, alcohol, and other mood-altering substances.  Some foods that are helpful in depression are: complex carbohydrates, B vitamins, flaxseed, fish or fish oil, fresh fruits and vegetables.    Psychotherapy  I agree to participate in Individual Therapy (if recommended).    Medication  If prescribed medications, I agree to take them.  Missing  doses can result in serious side effects.  I understand that drinking alcohol, or other illicit drug use, may cause potential side effects.  I will not stop my medication abruptly without first discussing it with my provider.    Staying Connected With Others  I will stay in touch with my friends, family members, and my primary care provider/team.    Use your imagination  Be creative.  We all have a creative side; it doesn t matter if it s oil painting, sand castles, or mud pies! This will also kick up the endorphins.    Witness Beauty  (AKA stop and smell the roses) Take a look outside, even in mid-winter. Notice colors, textures. Watch the squirrels and birds.     Service to others  Be of service to others.  There is always someone else in need.  By helping others we can  get out of ourselves  and remember the really important things.  This also provides opportunities for practicing all the other parts of the program.    Humor  Laugh and be silly!  Adjust your TV habits for less news and crime-drama and more comedy.    Control your stress  Try breathing deep, massage therapy, biofeedback, and meditation. Find time to relax each day.     My support system    Clinic Contact:  Phone number:    Contact 1:  Phone number:    Contact 2:  Phone number:    Jehovah's witness/:  Phone number:    Therapist:  Phone number:    Local crisis center:    Phone number:    Other community support:  Phone number:

## 2017-11-14 ASSESSMENT — ANXIETY QUESTIONNAIRES: GAD7 TOTAL SCORE: 19

## 2017-12-13 ENCOUNTER — OFFICE VISIT (OUTPATIENT)
Dept: INTERNAL MEDICINE | Facility: CLINIC | Age: 67
End: 2017-12-13
Payer: COMMERCIAL

## 2017-12-13 ENCOUNTER — RADIANT APPOINTMENT (OUTPATIENT)
Dept: GENERAL RADIOLOGY | Facility: CLINIC | Age: 67
End: 2017-12-13
Attending: INTERNAL MEDICINE
Payer: COMMERCIAL

## 2017-12-13 VITALS
OXYGEN SATURATION: 95 % | SYSTOLIC BLOOD PRESSURE: 124 MMHG | DIASTOLIC BLOOD PRESSURE: 76 MMHG | HEART RATE: 85 BPM | TEMPERATURE: 97.6 F | BODY MASS INDEX: 25.12 KG/M2 | WEIGHT: 128.2 LBS

## 2017-12-13 DIAGNOSIS — R07.89 CHEST PRESSURE: ICD-10-CM

## 2017-12-13 DIAGNOSIS — F33.1 MODERATE EPISODE OF RECURRENT MAJOR DEPRESSIVE DISORDER (H): ICD-10-CM

## 2017-12-13 DIAGNOSIS — R07.0 THROAT PAIN: Primary | ICD-10-CM

## 2017-12-13 DIAGNOSIS — J44.9 CHRONIC OBSTRUCTIVE PULMONARY DISEASE, UNSPECIFIED COPD TYPE (H): ICD-10-CM

## 2017-12-13 DIAGNOSIS — F32.1 MODERATE MAJOR DEPRESSION (H): ICD-10-CM

## 2017-12-13 LAB
DEPRECATED S PYO AG THROAT QL EIA: NORMAL
SPECIMEN SOURCE: NORMAL

## 2017-12-13 PROCEDURE — 71020 XR CHEST 2 VW: CPT

## 2017-12-13 PROCEDURE — 87880 STREP A ASSAY W/OPTIC: CPT | Performed by: INTERNAL MEDICINE

## 2017-12-13 PROCEDURE — 87081 CULTURE SCREEN ONLY: CPT | Performed by: INTERNAL MEDICINE

## 2017-12-13 PROCEDURE — 99214 OFFICE O/P EST MOD 30 MIN: CPT | Performed by: INTERNAL MEDICINE

## 2017-12-13 NOTE — LETTER
[place] [id number] [place] [address]    December 14, 2017    Michela MERCADO [place]  [address] [place] [address]          Dear Rose,    The strep culture is normal/negative. Take care    Enclosed is a copy of your results.     Results for orders placed or performed in visit on 12/13/17   XR Chest 2 Views    Narrative    CHEST TWO VIEWS  12/13/2017 11:05 AM     HISTORY: 67-year-old patient with history of chest pressure.       Impression    IMPRESSION: Since October 23, 2016, heart size remains normal. No  pleural effusion, pneumothorax, or abnormal area of consolidation.    BARRON GILBERT MD   Strep, Rapid Screen   Result Value Ref Range    Specimen Description Throat     Rapid Strep A Screen       NEGATIVE: No Group A streptococcal antigen detected by immunoassay, await culture report.   Beta strep group A culture   Result Value Ref Range    Specimen Description Throat     Culture Micro No beta hemolytic Streptococcus Group A isolated        If you have any questions or concerns, please call myself or my nurse at [phone].      Sincerely,        Frannie Allen MD/nena

## 2017-12-13 NOTE — NURSING NOTE
"Chief Complaint   Patient presents with     Pharyngitis     horse voice and headaches x 1 week.       Initial /76  Pulse 85  Temp 97.6  F (36.4  C) (Oral)  Wt 128 lb 3.2 oz (58.2 kg)  SpO2 95%  BMI 25.12 kg/m2 Estimated body mass index is 25.12 kg/(m^2) as calculated from the following:    Height as of 8/29/17: 4' 11.9\" (1.521 m).    Weight as of this encounter: 128 lb 3.2 oz (58.2 kg).  Medication Reconciliation: complete    "

## 2017-12-13 NOTE — MR AVS SNAPSHOT
After Visit Summary   12/13/2017    Michela Cole    MRN: [id number]           Patient Information     Date Of Birth          1950        Visit Information        Provider Department      12/13/2017 10:15 AM Frannie Allen MD [facility]        Today's Diagnoses     Throat pain    -  1    Moderate major depression (H)        Moderate episode of recurrent major depressive disorder (H)        Chronic obstructive pulmonary disease, unspecified COPD type (H)        Chest pressure          Care Instructions    Start using Anoro inhaler daily.  Follow up in 1 month for your COPD.  Continue to use the albuterol as needed.  Use humidity, salt water gargles, rody's vapor rub as ideas for over the counter treatment.   [facility]    If you have any questions regarding to your visit please contact your care team:     Team Pink:   Clinic Hours Telephone Number   Internal Medicine:  Dr. Frannie Edwards, NP       7am-7pm  Monday - Thursday   7am-5pm  Fridays  [phone]  (Appointment scheduling available 24/7)    Questions about your visit?  Team Line  [phone]   [facility] [place] [facility] - 11am-9pm Monday-Friday Saturday-Sunday- 9am-5pm   [place] - 5pm-9pm Monday-Friday Saturday-Sunday- 9am-5pm  [phone] - [place]   [phone] - [place]       What options do I have for visits at the clinic other than the traditional office visit?  To expand how we care for you, many of our providers are utilizing electronic visits (e-visits) and telephone visits, when medically appropriate, for interactions with their patients rather than a visit in the clinic.   We also offer nurse visits for many medical concerns. Just like any other service, we will bill your insurance company for this type of visit based on time spent on the phone with your provider. Not all insurance companies cover these visits. Please  "check with your medical insurance if this type of visit is covered. You will be responsible for any charges that are not paid by your insurance.      E-visits via Admifyhart:  generally incur a $35.00 fee.  Telephone visits:  Time spent on the phone: *charged based on time that is spent on the phone in increments of 10 minutes. Estimated cost:   5-10 mins $30.00   11-20 mins. $59.00   21-30 mins. $85.00   Use Guided Interventionst (secure email communication and access to your chart) to send your primary care provider a message or make an appointment. Ask someone on your Team how to sign up for [employer].    For a Price Quote for your services, please call our hint Line at [phone].    As always, Thank you for trusting us with your health care needs!    Clare Kc MA                Follow-ups after your visit        Who to contact     If you have questions or need follow up information about today's clinic visit or your schedule please contact [facility] directly at [phone].  Normal or non-critical lab and imaging results will be communicated to you by Admifyhart, letter or phone within 4 business days after the clinic has received the results. If you do not hear from us within 7 days, please contact the clinic through Guided Interventionst or phone. If you have a critical or abnormal lab result, we will notify you by phone as soon as possible.  Submit refill requests through [employer] On [employer] or call your pharmacy and they will forward the refill request to us. Please allow 3 business days for your refill to be completed.          Additional Information About Your Visit        [employer] Information     [employer] lets you send messages to your doctor, view your test results, renew your prescriptions, schedule appointments and more. To sign up, go to [email or web address] . Click on \"Log in\" on the left side of the screen, which will take you to the Welcome page. Then click on \"Sign up Now\" on the right side of the page. "     You will be asked to enter the access code listed below, as well as some personal information. Please follow the directions to create your username and password.     Your access code is: [email or web address]  Expires: 2018  2:16 PM     Your access code will  in 90 days. If you need help or a new code, please call your [facility] clinic or [phone].        Care EveryWhere ID     This is your Care EveryWhere ID. This could be used by other organizations to access your [facility] medical records  [id number]        Your Vitals Were     Pulse Temperature Pulse Oximetry BMI (Body Mass Index)          85 97.6  F (36.4  C) (Oral) 95% 25.12 kg/m2         Blood Pressure from Last 3 Encounters:   17 124/76   17 118/64   17 112/73    Weight from Last 3 Encounters:   17 128 lb 3.2 oz (58.2 kg)   17 127 lb (57.6 kg)   17 126 lb 5.2 oz (57.3 kg)              We Performed the Following     Beta strep group A culture     Strep, Rapid Screen     XR Chest 2 Views          Today's Medication Changes          These changes are accurate as of: 17 10:48 AM.  If you have any questions, ask your nurse or doctor.               Start taking these medicines.        Dose/Directions    umeclidinium-vilanterol 62.5-25 MCG/INH oral inhaler   Commonly known as:  ANORO ELLIPTA   Used for:  Chronic obstructive pulmonary disease, unspecified COPD type (H)   Started by:  Frannie Allen MD        Dose:  1 puff   Inhale 1 puff into the lungs daily   Quantity:  1 Inhaler   Refills:  0         These medicines have changed or have updated prescriptions.        Dose/Directions    levothyroxine 75 MCG tablet   Commonly known as:  SYNTHROID/LEVOTHROID   This may have changed:  when to take this   Used for:  Hypothyroidism, unspecified type        Dose:  75 mcg   Take 1 tablet (75 mcg) by mouth daily   Quantity:  90 tablet   Refills:  3       metoprolol 50 MG 24 hr tablet   Commonly known as:  TOPROL-XL    This may have changed:  when to take this   Used for:  Coronary artery disease involving native coronary artery of native heart without angina pectoris        Dose:  50 mg   Take 1 tablet (50 mg) by mouth daily   Quantity:  90 tablet   Refills:  3         Stop taking these medicines if you haven't already. Please contact your care team if you have questions.     ipratropium - albuterol 0.5 mg/2.5 mg/3 mL 0.5-2.5 (3) MG/3ML neb solution   Commonly known as:  DUONEB   Stopped by:  Frannie Allen MD                Where to get your medicines      These medications were sent to [id number] - [place], [place] - [address]  [address] [facility] [address]     Phone:  [phone]     umeclidinium-vilanterol 62.5-25 MCG/INH oral inhaler                Primary Care Provider Office Phone # Fax #    Frannie Allen [phone] [address] [address]        Goals        General    Functional (pt-stated)     Notes - Note created  9/1/2015 11:20 AM by Janneth Orellana RN    I will rinse my mouth out after using my inhaler to help keep the lining of my mouth healthy  As of today's date 9/1/2015 goal is met at 0 - 25%.   Goal Status:  Ongoing      Medication (pt-stated)     Notes - Note edited  9/1/2015  2:10 PM by Janneth Orellana, RN    I will use my inhalers as instructed  As of today's date 9/1/2015 goal is met at 0 - 25%.   Goal Status:  Activeedule  Appointment with ENT       Psychosocial (pt-stated)     Notes - Note edited  9/1/2015  2:10 PM by Janneth Orellana, RN    I will  Investigate counseling and/or support groups if and when I am ready to get formal support  As of today's date 9/1/2015 goal is met at 0 - 25%.   Goal Status:  Ongoing        Equal Access to Services     [facility] JENNIFER AH: Zak Quispe, irina feliz, latricia murrieta, charline rose. [facility] [phone].    ATENCIÓN: Si rbice sandoval,  tiene a de la rosa disposición servicios gratuitos de asistencia lingüística. Madhavi trevino [phone].    We comply with applicable federal civil rights laws and [place] laws. We do not discriminate on the basis of race, color, national origin, age, disability, sex, sexual orientation, or gender identity.            Thank you!     Thank you for choosing [facility]  for your care. Our goal is always to provide you with excellent care. Hearing back from our patients is one way we can continue to improve our services. Please take a few minutes to complete the written survey that you may receive in the mail after your visit with us. Thank you!             Your Updated Medication List - Protect others around you: Learn how to safely use, store and throw away your medicines at www.disposemymeds.org.          This list is accurate as of: 12/13/17 10:48 AM.  Always use your most recent med list.                   Brand Name Dispense Instructions for use Diagnosis    albuterol 108 (90 BASE) MCG/ACT Inhaler    PROAIR HFA/PROVENTIL HFA/VENTOLIN HFA    2 Inhaler    Inhale 2 puffs into the lungs every 6 hours as needed for shortness of breath / dyspnea    Chronic obstructive pulmonary disease, unspecified COPD type (H)       aspirin EC 81 MG EC tablet      Take 81 mg by mouth every morning        atorvastatin 40 MG tablet    LIPITOR    90 tablet    Take 1 tablet (40 mg) by mouth At Bedtime    Hyperlipidemia LDL goal <100       BRILINTA 60 MG tablet   Generic drug:  ticagrelor      Take 60 mg by mouth daily        buPROPion 200 MG 12 hr tablet    WELLBUTRIN SR    180 tablet    TAKE ONE TABLET BY MOUTH TWICE DAILY.    Moderate episode of recurrent major depressive disorder (H)       diclofenac 1 % Gel topical gel    VOLTAREN    100 g    Apply  2 grams to hands four times daily using enclosed dosing card.    Primary osteoarthritis of both hands       escitalopram 10 MG tablet    LEXAPRO    30 tablet    Take 1 tablet (10 mg)  by mouth daily    Moderate episode of recurrent major depressive disorder (H)       isosorbide mononitrate 30 MG 24 hr tablet    IMDUR    180 tablet    Take 2 tablets (60 mg) by mouth daily    Coronary artery disease involving native coronary artery of native heart without angina pectoris       levothyroxine 75 MCG tablet    SYNTHROID/LEVOTHROID    90 tablet    Take 1 tablet (75 mcg) by mouth daily    Hypothyroidism, unspecified type       LORazepam 0.5 MG tablet    ATIVAN    1 tablet    TAKE 1/2-1 TABLET BY MOUTH EVERY 8 HOURS AS NEEDED FOR ANXIETY. DO NOT OPERATE A VEHICLE AFTER TAKING THIS MEDICATION    Anxiety       meclizine 25 MG tablet    ANTIVERT    30 tablet    Take 1 tablet (25 mg) by mouth every 6 hours as needed for dizziness    BPV (benign positional vertigo), left       metoprolol 50 MG 24 hr tablet    TOPROL-XL    90 tablet    Take 1 tablet (50 mg) by mouth daily    Coronary artery disease involving native coronary artery of native heart without angina pectoris       nitroGLYcerin 0.4 MG sublingual tablet    NITROSTAT    25 tablet    Place 1 tablet (0.4 mg) under the tongue every 5 minutes as needed for chest pain    Coronary artery disease involving native coronary artery of native heart without angina pectoris       order for DME     1 Device    Equipment being ordered: incentive spirometry    Pneumonia of right middle lobe due to infectious organism (H)       pantoprazole 40 MG EC tablet    PROTONIX    180 tablet    TAKE 1 TABLET BY MOUTH 2 TIMES DAILY. TAKE BY MOUTH 30-60 MINUTES BEFORE A MEAL    GERD (gastroesophageal reflux disease)       umeclidinium-vilanterol 62.5-25 MCG/INH oral inhaler    ANORO ELLIPTA    1 Inhaler    Inhale 1 puff into the lungs daily    Chronic obstructive pulmonary disease, unspecified COPD type (H)

## 2017-12-13 NOTE — PROGRESS NOTES
INTERNAL MEDICINE  SUBJECTIVE:   Michela Cole is a 67 year old female who presents to clinic today for the following health issues:    Patient presents to clinic today for sore throat and hoarseness.        Throat pain - She has had a sore throat for the last week. Her voice is hoarse. She does not feel her sore throat is improving. Patient is unable to cough despite trying. Her throat was not red over the weekend. She has felt chilled. Denies fever, ear pain, nausea, vomiting, diarrhea, abdominal pain. Patient has cramping but has not had her ovarian cyst removed yet.    She has chest pressure and feels short of breath with activity, more so in the last week. Her insurance would not cover the other inhaler they recommended. She is not smoking and has not had a desire to since she quit in 2013.     Depression - She saw Swapna Edwards on 11/13/17 and was started on Lexapro 10 mg to help with her depression. She is tolerating the medication and it has stopped her from crying. She is having sexual side effects of either Lexapro or Wellbutrin, which she can deal with until her mood improves. She is still dealing with her sons drug addiction and he is living with her. She feels safe at home.        Problem list and histories reviewed & adjusted, as indicated.  Additional history: as documented    Labs reviewed in EPIC  Reviewed and updated as needed this visit by clinical staff  Tobacco  Allergies  Meds       Reviewed and updated as needed this visit by Provider         ROS:  C: NEGATIVE for fever, chills, change in weight  E/M: NEGATIVE for ear, mouth and throat problems  R: NEGATIVE for significant cough or SOB  CV: NEGATIVE for chest pain, palpitations or peripheral edema  N: NEGATIVE for weakness, dizziness or paresthesias  P: NEGATIVE for changes in mood or affect    This document serves as a record of the services and decisions personally performed and made by Frannie Allen MD. It was created on his/her  behalf by Isatu Stout, trained medical scribe. The creation of this document is based the provider's statements to the medical scribes.    J Luisibalmaz Stout 10:32 AM, December 13, 2017  OBJECTIVE:     /76  Pulse 85  Temp 97.6  F (36.4  C) (Oral)  Wt 58.2 kg (128 lb 3.2 oz)  SpO2 95%  BMI 25.12 kg/m2  Body mass index is 25.12 kg/(m^2).  GENERAL: healthy, alert and no distress  HENT: ear canals and TM's normal, nose and mouth without ulcers or lesions, mild erythema in the posterior pharynx   NECK: no adenopathy, no asymmetry, masses, or scars and thyroid normal to palpation  RESP: lungs clear to auscultation - no rales, rhonchi or wheezes  CV: regular rate and rhythm, normal S1 S2, no S3 or S4, no murmur, click or rub, no peripheral edema and peripheral pulses strong  NEURO: Normal strength and tone, mentation intact and speech normal  PSYCH: mentation appears normal, affect normal/bright    Diagnostic Test Results:  Results for orders placed or performed in visit on 12/13/17 (from the past 24 hour(s))   Strep, Rapid Screen   Result Value Ref Range    Specimen Description Throat     Rapid Strep A Screen       NEGATIVE: No Group A streptococcal antigen detected by immunoassay, await culture report.   XR Chest 2 Views    Narrative    CHEST TWO VIEWS  12/13/2017 11:05 AM     HISTORY: 67-year-old patient with history of chest pressure.       Impression    IMPRESSION: Since October 23, 2016, heart size remains normal. No  pleural effusion, pneumothorax, or abnormal area of consolidation.    BARRON GILBERT MD      ASSESSMENT/PLAN:   1. Throat pain  x1 week. Rapid strep negative. Suggested she use a humidifier, try salt water gargles, Luis's Vapor rub, and other OTC treatments.   - Strep, Rapid Screen  - Beta strep group A culture    2. Moderate major depression (H)  Stable. She is tolerating Lexapro 10 mg well and is no longer having crying episodes. Continues on Wellbutrin 200 mg and Lexapro 10 mg  daily.    3. Chronic obstructive pulmonary disease, unspecified COPD type (H)  She is experiencing increased SOB with activity. Pt quit smoking in 2013. She will start using Anoro Ellipta inhaler daily and continue to use her albuterol inhaler as needed. Follow up in one month.  Consider adding on steroid inhaler/beta agonist at follow up such as dulera.  - umeclidinium-vilanterol (ANORO ELLIPTA) 62.5-25 MCG/INH oral inhaler; Inhale 1 puff into the lungs daily  Dispense: 1 Inhaler; Refill: 0    5. Chest pressure  Will check chest XR today. Hopeful she will see improvement with Anoro inhaler.  She feels it is related to her URI and history would support this.  - XR Chest 2 Views      Patient Instructions     Start using Anoro inhaler daily.  Follow up in 1 month for your COPD.  Continue to use the albuterol as needed.  Use humidity, salt water gargles, rody's vapor rub as ideas for over the counter treatment.   [facility]    If you have any questions regarding to your visit please contact your care team:     Team Pink:   Clinic Hours Telephone Number   Internal Medicine:  Dr. Frannie Edwards, NP       7am-7pm  Monday - Thursday   7am-5pm  Fridays  [phone]  (Appointment scheduling available 24/7)    Questions about your visit?  Team Line  [phone]   Urgent Care - [place] - 11am-9pm Monday-Friday Saturday-Sunday- 9am-5pm   [place] - 5pm-9pm Monday-Friday Saturday-Sunday- 9am-5pm  [phone] - [place]   [phone] - [place]       What options do I have for visits at the clinic other than the traditional office visit?  To expand how we care for you, many of our providers are utilizing electronic visits (e-visits) and telephone visits, when medically appropriate, for interactions with their patients rather than a visit in the clinic.   We also offer nurse visits for many medical concerns. Just like any other service, we  will bill your insurance company for this type of visit based on time spent on the phone with your provider. Not all insurance companies cover these visits. Please check with your medical insurance if this type of visit is covered. You will be responsible for any charges that are not paid by your insurance.      E-visits via [email or web address]:  generally incur a $35.00 fee.  Telephone visits:  Time spent on the phone: *charged based on time that is spent on the phone in increments of 10 minutes. Estimated cost:   5-10 mins $30.00   11-20 mins. $59.00   21-30 mins. $85.00   Use GoFormzt (secure email communication and access to your chart) to send your primary care provider a message or make an appointment. Ask someone on your Team how to sign up for [employer].    For a Price Quote for your services, please call our Consumer Price Line at [phone].    As always, Thank you for trusting us with your health care needs!    Clare Kc MA          I spent 14 minutes of time with the patient and >50% of it was in education and counseling regarding sore throat.    The information in this document, created by the medical scribe for me, accurately reflects the services I personally performed and the decisions made by me. I have reviewed and approved this document for accuracy prior to leaving the patient care area.  Frannie Allen MD  10:32 AM, 12/13/17    Frannie Allen MD  [facility]    Start 10:32 AM  End 10:46 AM

## 2017-12-13 NOTE — PATIENT INSTRUCTIONS
Start using Anoro inhaler daily.  Follow up in 1 month for your COPD.  Continue to use the albuterol as needed.  Use humidity, salt water gargles, rody's vapor rub as ideas for over the counter treatment.   [facility]    If you have any questions regarding to your visit please contact your care team:     Team Pink:   Clinic Hours Telephone Number   Internal Medicine:  Dr. Frannie Edwards, NP       7am-7pm  Monday - Thursday   7am-5pm  Fridays  [phone]  (Appointment scheduling available 24/7)    Questions about your visit?  Team Line  [phone]   [place] [place] [facility] [place] - 11am-9pm Monday-Friday Saturday-Sunday- 9am-5pm   [place] - 5pm-9pm Monday-Friday Saturday-Sunday- 9am-5pm  [phone] - [place]   [phone] - [place]       What options do I have for visits at the clinic other than the traditional office visit?  To expand how we care for you, many of our providers are utilizing electronic visits (e-visits) and telephone visits, when medically appropriate, for interactions with their patients rather than a visit in the clinic.   We also offer nurse visits for many medical concerns. Just like any other service, we will bill your insurance company for this type of visit based on time spent on the phone with your provider. Not all insurance companies cover these visits. Please check with your medical insurance if this type of visit is covered. You will be responsible for any charges that are not paid by your insurance.      E-visits via Cubicl:  generally incur a $35.00 fee.  Telephone visits:  Time spent on the phone: *charged based on time that is spent on the phone in increments of 10 minutes. Estimated cost:   5-10 mins $30.00   11-20 mins. $59.00   21-30 mins. $85.00   Use Cubicl (secure email communication and access to your chart) to send your primary care provider a message or make an appointment. Ask someone on  your Team how to sign up for [employer].    For a Price Quote for your services, please call our Consumer Price Line at [phone].    As always, Thank you for trusting us with your health care needs!    Clare Kc MA

## 2017-12-13 NOTE — LETTER
[place] [id number] [place] [address]    December 14, 2017    Michela I [place]  [address] [id number]          Dear Rose,    Normal chest xray    Enclosed is a copy of your results.     Results for orders placed or performed in visit on 12/13/17   XR Chest 2 Views    Narrative    CHEST TWO VIEWS  12/13/2017 11:05 AM     HISTORY: 67-year-old patient with history of chest pressure.       Impression    IMPRESSION: Since October 23, 2016, heart size remains normal. No  pleural effusion, pneumothorax, or abnormal area of consolidation.    BARRON GILBERT MD   Strep, Rapid Screen   Result Value Ref Range    Specimen Description Throat     Rapid Strep A Screen       NEGATIVE: No Group A streptococcal antigen detected by immunoassay, await culture report.   Beta strep group A culture   Result Value Ref Range    Specimen Description Throat     Culture Micro No beta hemolytic Streptococcus Group A isolated        If you have any questions or concerns, please call myself or my nurse at [phone].      Sincerely,        Frannie Allen MD/nena

## 2017-12-14 LAB
BACTERIA SPEC CULT: NORMAL
SPECIMEN SOURCE: NORMAL

## 2018-03-13 ENCOUNTER — OFFICE VISIT (OUTPATIENT)
Dept: FAMILY MEDICINE | Facility: CLINIC | Age: 68
End: 2018-03-13
Payer: COMMERCIAL

## 2018-03-13 VITALS
DIASTOLIC BLOOD PRESSURE: 70 MMHG | RESPIRATION RATE: 16 BRPM | SYSTOLIC BLOOD PRESSURE: 128 MMHG | OXYGEN SATURATION: 98 % | BODY MASS INDEX: 25.67 KG/M2 | TEMPERATURE: 98 F | HEART RATE: 85 BPM | WEIGHT: 131 LBS

## 2018-03-13 DIAGNOSIS — E78.5 HYPERLIPIDEMIA LDL GOAL <100: ICD-10-CM

## 2018-03-13 DIAGNOSIS — F33.1 MODERATE EPISODE OF RECURRENT MAJOR DEPRESSIVE DISORDER (H): ICD-10-CM

## 2018-03-13 DIAGNOSIS — J44.9 CHRONIC OBSTRUCTIVE PULMONARY DISEASE, UNSPECIFIED COPD TYPE (H): ICD-10-CM

## 2018-03-13 DIAGNOSIS — N83.202 CYST OF LEFT OVARY: ICD-10-CM

## 2018-03-13 DIAGNOSIS — E03.9 HYPOTHYROIDISM, UNSPECIFIED TYPE: ICD-10-CM

## 2018-03-13 DIAGNOSIS — Z91.81 AT RISK FOR FALLING: ICD-10-CM

## 2018-03-13 DIAGNOSIS — I73.9 CLAUDICATION (H): Primary | ICD-10-CM

## 2018-03-13 DIAGNOSIS — I25.10 CORONARY ARTERY DISEASE INVOLVING NATIVE CORONARY ARTERY OF NATIVE HEART WITHOUT ANGINA PECTORIS: ICD-10-CM

## 2018-03-13 LAB
LDLC SERPL DIRECT ASSAY-MCNC: 96 MG/DL
T4 FREE SERPL-MCNC: 1.11 NG/DL (ref 0.76–1.46)
TSH SERPL DL<=0.005 MIU/L-ACNC: 0.32 MU/L (ref 0.4–4)

## 2018-03-13 PROCEDURE — 83721 ASSAY OF BLOOD LIPOPROTEIN: CPT | Mod: 59 | Performed by: NURSE PRACTITIONER

## 2018-03-13 PROCEDURE — 36415 COLL VENOUS BLD VENIPUNCTURE: CPT | Performed by: NURSE PRACTITIONER

## 2018-03-13 PROCEDURE — 84439 ASSAY OF FREE THYROXINE: CPT | Performed by: NURSE PRACTITIONER

## 2018-03-13 PROCEDURE — 99214 OFFICE O/P EST MOD 30 MIN: CPT | Performed by: NURSE PRACTITIONER

## 2018-03-13 PROCEDURE — 84443 ASSAY THYROID STIM HORMONE: CPT | Performed by: NURSE PRACTITIONER

## 2018-03-13 RX ORDER — LEVOTHYROXINE SODIUM 75 UG/1
75 TABLET ORAL DAILY
Qty: 90 TABLET | Refills: 3 | Status: SHIPPED | OUTPATIENT
Start: 2018-03-13 | End: 2018-03-14

## 2018-03-13 RX ORDER — ESCITALOPRAM OXALATE 10 MG/1
10 TABLET ORAL DAILY
Qty: 90 TABLET | Refills: 1 | Status: SHIPPED | OUTPATIENT
Start: 2018-03-13 | End: 2018-08-01

## 2018-03-13 RX ORDER — ISOSORBIDE MONONITRATE 30 MG/1
60 TABLET, EXTENDED RELEASE ORAL DAILY
Qty: 180 TABLET | Refills: 1 | Status: SHIPPED | OUTPATIENT
Start: 2018-03-13

## 2018-03-13 RX ORDER — FLUTICASONE PROPIONATE 44 UG/1
2 AEROSOL, METERED RESPIRATORY (INHALATION) 2 TIMES DAILY
Qty: 1 INHALER | Refills: 0 | Status: SHIPPED | OUTPATIENT
Start: 2018-03-13 | End: 2018-03-16

## 2018-03-13 RX ORDER — LORAZEPAM 0.5 MG/1
TABLET ORAL
Qty: 1 TABLET | Refills: 0 | Status: CANCELLED | OUTPATIENT
Start: 2018-03-13

## 2018-03-13 RX ORDER — ATORVASTATIN CALCIUM 40 MG/1
40 TABLET, FILM COATED ORAL AT BEDTIME
Qty: 90 TABLET | Refills: 1 | Status: SHIPPED | OUTPATIENT
Start: 2018-03-13 | End: 2018-10-18

## 2018-03-13 ASSESSMENT — ANXIETY QUESTIONNAIRES
3. WORRYING TOO MUCH ABOUT DIFFERENT THINGS: NEARLY EVERY DAY
5. BEING SO RESTLESS THAT IT IS HARD TO SIT STILL: MORE THAN HALF THE DAYS
7. FEELING AFRAID AS IF SOMETHING AWFUL MIGHT HAPPEN: NEARLY EVERY DAY
IF YOU CHECKED OFF ANY PROBLEMS ON THIS QUESTIONNAIRE, HOW DIFFICULT HAVE THESE PROBLEMS MADE IT FOR YOU TO DO YOUR WORK, TAKE CARE OF THINGS AT HOME, OR GET ALONG WITH OTHER PEOPLE: SOMEWHAT DIFFICULT
6. BECOMING EASILY ANNOYED OR IRRITABLE: SEVERAL DAYS
1. FEELING NERVOUS, ANXIOUS, OR ON EDGE: MORE THAN HALF THE DAYS
2. NOT BEING ABLE TO STOP OR CONTROL WORRYING: NEARLY EVERY DAY
GAD7 TOTAL SCORE: 17

## 2018-03-13 ASSESSMENT — PAIN SCALES - GENERAL: PAINLEVEL: NO PAIN (0)

## 2018-03-13 ASSESSMENT — PATIENT HEALTH QUESTIONNAIRE - PHQ9: 5. POOR APPETITE OR OVEREATING: NEARLY EVERY DAY

## 2018-03-13 NOTE — PROGRESS NOTES
SUBJECTIVE:   Michela Cole is a 67 year old female who presents to clinic today for the following health issues:      Medication Followup of Depression    Taking Medication as prescribed: yes    Side Effects:  None    Medication Helping Symptoms:  yes     Patient feels her mood is improved with lexapro.  She is not crying all the time.  She does note sexual side effects and does not want to increase medication further.    Vascular Disease Follow-up:  Coronary Artery Disease (CAD)      Chest pain or pressure, left side neck or arm pain: No    Shortness of breath/increased sweats/nausea with exertion: Yes chronic and due to COPD    Pain in calves walking 1-2 blocks: Yes     Worsened or new symptoms since last visit: No    Nitroglycerin use: occasionally    Daily aspirin use: Yes    COPD Follow-Up    Symptoms are currently: stable    Current fatigue or dyspnea with ambulation: stable     Shortness of breath: stable    Increased or change in Cough/Sputum: No    Fever(s): No    Baseline ambulation without stopping to rest:  1-2 blocks. Able to walk up 0 flights of stairs without stopping to rest.  Any ER/UC or hospital admissions since your last visit? No     Patient does not note much improvement with switching to anoro.  She continues to have dyspnea with exertion, shortness of breath.    History   Smoking Status     Former Smoker     Packs/day: 0.25     Years: 42.00     Types: Cigarettes     Quit date: 6/24/2013   Smokeless Tobacco     Never Used     Lab Results   Component Value Date    FEV1 90 09/26/2014    VCP9OCG 86 09/26/2014       Hypothyroidism Follow-up      Since last visit, patient describes the following symptoms: Weight stable, no hair loss, no skin changes, no constipation, no loose stools      Problem list and histories reviewed & adjusted, as indicated.  Additional history: as documented    Reviewed and updated as needed this visit by clinical staff       Reviewed and updated as needed this  visit by Provider         ROS:  Constitutional, HEENT, cardiovascular, pulmonary, gi and gu systems are negative, except as otherwise noted.    OBJECTIVE:     /70  Pulse 85  Temp 98  F (36.7  C) (Oral)  Resp 16  Wt 131 lb (59.4 kg)  SpO2 98%  BMI 25.67 kg/m2  Body mass index is 25.67 kg/(m^2).  GENERAL: healthy, alert and no distress  RESP: lungs clear to auscultation - no rales, rhonchi or wheezes  CV: regular rates and rhythm, normal S1 S2, no S3 or S4, no murmur, click or rub, decreased pulse bilateral dorsalis pedis, posterior tibialis and no peripheral edema  MS: no gross musculoskeletal defects noted, no edema  PSYCH: mentation appears normal, affect normal/bright    Diagnostic Test Results:  pending    ASSESSMENT/PLAN:     1. Claudication (H)  Rule out PAD as contributing to cramping, pain in legs with walking.  - US ESTER Doppler No Exercise; Future    2. Moderate episode of recurrent major depressive disorder (H)  Stable.  Continue current treatment plan and medications.    - escitalopram (LEXAPRO) 10 MG tablet; Take 1 tablet (10 mg) by mouth daily due for office visit for further refills.  Dispense: 90 tablet; Refill: 1    3. Chronic obstructive pulmonary disease, unspecified COPD type (H)  Patient to try adding flovent to see if symptoms improve.  - umeclidinium-vilanterol (ANORO ELLIPTA) 62.5-25 MCG/INH oral inhaler; Inhale 1 puff into the lungs daily  Dispense: 1 Inhaler; Refill: 5  - fluticasone (FLOVENT HFA) 44 MCG/ACT Inhaler; Inhale 2 puffs into the lungs 2 times daily  Dispense: 1 Inhaler; Refill: 0    4. Coronary artery disease involving native coronary artery of native heart without angina pectoris  Stable.  Continue current treatment plan and medications.    - isosorbide mononitrate (IMDUR) 30 MG 24 hr tablet; Take 2 tablets (60 mg) by mouth daily  Dispense: 180 tablet; Refill: 1    5. Hypothyroidism, unspecified type  Stable.  Continue current treatment plan and medications.    -  levothyroxine (SYNTHROID/LEVOTHROID) 75 MCG tablet; Take 1 tablet (75 mcg) by mouth daily  Dispense: 90 tablet; Refill: 3  - TSH WITH FREE T4 REFLEX    6. Hyperlipidemia LDL goal <100  Stable.  Continue current treatment plan and medications.    - atorvastatin (LIPITOR) 40 MG tablet; Take 1 tablet (40 mg) by mouth At Bedtime  Dispense: 90 tablet; Refill: 1  - LDL cholesterol direct    7. At risk for falling      8. Cyst of left ovary  Patient requests repeat ultrasound prior to follow-up with OB/Gyn provider to discuss surgery.  This was supposed to be done in 9/17.  - US Pelvic Complete w Transvaginal; Future    FUTURE APPOINTMENTS:       - Follow-up visit in 1 month for recheck of COPD.    JOSE [facility]

## 2018-03-13 NOTE — PATIENT INSTRUCTIONS
Schedule ESTER, pelvic ultrasound.  Schedule follow-up with Ob/Gyn provider.  Start flovent inhaler 2 puffs twice daily, rinse your mouth out afterwards.  Follow-up in 1 month with Frannie Allen MD or myself.  .[facility]    If you have any questions regarding to your visit please contact your care team:     Team Pink:   Clinic Hours Telephone Number   Internal Medicine:  Dr. Frannie Edwards, NP       7am-7pm  Monday - Thursday   7am-5pm  Fridays  [phone]  (Appointment scheduling available 24/7)    Questions about your visit?  Team Line  [phone]   Urgent Care - [place] [facility] - 11am-9pm Monday-Friday Saturday-Sunday- 9am-5pm   [place] - 5pm-9pm Monday-Friday Saturday-Sunday- 9am-5pm  [phone] - [place]   [phone] - [place]       What options do I have for visits at the clinic other than the traditional office visit?  To expand how we care for you, many of our providers are utilizing electronic visits (e-visits) and telephone visits, when medically appropriate, for interactions with their patients rather than a visit in the clinic.   We also offer nurse visits for many medical concerns. Just like any other service, we will bill your insurance company for this type of visit based on time spent on the phone with your provider. Not all insurance companies cover these visits. Please check with your medical insurance if this type of visit is covered. You will be responsible for any charges that are not paid by your insurance.      E-visits via [employer]:  generally incur a $35.00 fee.  Telephone visits:  Time spent on the phone: *charged based on time that is spent on the phone in increments of 10 minutes. Estimated cost:   5-10 mins $30.00   11-20 mins. $59.00   21-30 mins. $85.00   Use [employer] (secure email communication and access to your chart) to send your primary care provider a message or make an appointment. Ask someone on  your Team how to sign up for Exerg[employer]t.    For a Price Quote for your services, please call our Consumer Price Line at [phone].    As always, Thank you for trusting us with your health care needs!    Valentine Phillips CMA

## 2018-03-13 NOTE — MR AVS SNAPSHOT
After Visit Summary   3/13/2018    Michela Cole    MRN: [id number]           Patient Information     Date Of Birth          1950        Visit Information        Provider Department      3/13/2018 2:00 PM Swapna Edwards APRN [facility]        Today's Diagnoses     Claudication (H)    -  1    Moderate episode of recurrent major depressive disorder (H)        Chronic obstructive pulmonary disease, unspecified COPD type (H)        Coronary artery disease involving native coronary artery of native heart without angina pectoris        Hypothyroidism, unspecified type        Hyperlipidemia LDL goal <100        At risk for falling        Cyst of left ovary          Care Instructions    Schedule ESTER, pelvic ultrasound.  Schedule follow-up with Ob/Gyn provider.  Start flovent inhaler 2 puffs twice daily, rinse your mouth out afterwards.  Follow-up in 1 month with Frannie Allen MD or myself.  .[facility]    If you have any questions regarding to your visit please contact your care team:     Team Pink:   Clinic Hours Telephone Number   Internal Medicine:  Dr. Frannie Edwards, NP       7am-7pm  Monday - Thursday   7am-5pm  Fridays  [phone]  (Appointment scheduling available 24/7)    Questions about your visit?  Team Line  [phone]   Urgent Care - [place] - 11am-9pm Monday-Friday Saturday-Sunday- 9am-5pm   [place] - 5pm-9pm Monday-Friday Saturday-Sunday- 9am-5pm  [phone] - [place]   [phone] - [place]       What options do I have for visits at the clinic other than the traditional office visit?  To expand how we care for you, many of our providers are utilizing electronic visits (e-visits) and telephone visits, when medically appropriate, for interactions with their patients rather than a visit in the clinic.   We also offer nurse visits for many medical concerns. Just like  any other service, we will bill your insurance company for this type of visit based on time spent on the phone with your provider. Not all insurance companies cover these visits. Please check with your medical insurance if this type of visit is covered. You will be responsible for any charges that are not paid by your insurance.      E-visits via [employer]:  generally incur a $35.00 fee.  Telephone visits:  Time spent on the phone: *charged based on time that is spent on the phone in increments of 10 minutes. Estimated cost:   5-10 mins $30.00   11-20 mins. $59.00   21-30 mins. $85.00   Use Lead[employer]D (secure email communication and access to your chart) to send your primary care provider a message or make an appointment. Ask someone on your Team how to sign up for LeadiD.    For a Price Quote for your services, please call our [facility] Line at [phone].    As always, Thank you for trusting us with your health care needs!    Valentine Phillips [facility]             Follow-ups after your visit        Future tests that were ordered for you today     Open Future Orders        Priority Expected Expires Ordered    US Pelvic Complete w Transvaginal Routine  3/13/2019 3/13/2018    US ESTER Doppler No Exercise Routine  3/13/2019 3/13/2018            Who to contact     If you have questions or need follow up information about today's clinic visit or your schedule please contact [facility] directly at [phone].  Normal or non-critical lab and imaging results will be communicated to you by [employer], letter or phone within 4 business days after the clinic has received the results. If you do not hear from us within 7 days, please contact the clinic through [employer] or phone. If you have a critical or abnormal lab result, we will notify you by phone as soon as possible.  Submit refill requests through LeadiD or call your pharmacy and they will forward the refill request to us. Please allow 3 business days for your  "refill to be completed.          Additional Information About Your Visit        Eloquii Information     Eloquii lets you send messages to your doctor, view your test results, renew your prescriptions, schedule appointments and more. To sign up, go to [email or web address] . Click on \"Log in\" on the left side of the screen, which will take you to the Welcome page. Then click on \"Sign up Now\" on the right side of the page.     You will be asked to enter the access code listed below, as well as some personal information. Please follow the directions to create your username and password.     Your access code is: [email or web address]  Expires: 2018  2:42 PM     Your access code will  in 90 days. If you need help or a new code, please call your [facility] clinic or [phone].        Care EveryWhere ID     This is your Care EveryWhere ID. This could be used by other organizations to access your [facility] medical records  [id number]        Your Vitals Were     Pulse Temperature Respirations Pulse Oximetry BMI (Body Mass Index)       85 98  F (36.7  C) (Oral) 16 98% 25.67 kg/m2        Blood Pressure from Last 3 Encounters:   18 128/70   17 124/76   17 118/64    Weight from Last 3 Encounters:   18 131 lb (59.4 kg)   17 128 lb 3.2 oz (58.2 kg)   17 127 lb (57.6 kg)              We Performed the Following     LDL cholesterol direct     TSH WITH FREE T4 REFLEX          Today's Medication Changes          These changes are accurate as of 3/13/18  2:42 PM.  If you have any questions, ask your nurse or doctor.               Start taking these medicines.        Dose/Directions    fluticasone 44 MCG/ACT Inhaler   Commonly known as:  FLOVENT HFA   Used for:  Chronic obstructive pulmonary disease, unspecified COPD type (H)   Started by:  Swapna Edwards APRN CNP        Dose:  2 puff   Inhale 2 puffs into the lungs 2 times daily   Quantity:  1 Inhaler   Refills:  0         These " medicines have changed or have updated prescriptions.        Dose/Directions    levothyroxine 75 MCG tablet   Commonly known as:  SYNTHROID/LEVOTHROID   This may have changed:  when to take this   Used for:  Hypothyroidism, unspecified type        Dose:  75 mcg   Take 1 tablet (75 mcg) by mouth daily   Quantity:  90 tablet   Refills:  3       metoprolol succinate 50 MG 24 hr tablet   Commonly known as:  TOPROL-XL   This may have changed:  when to take this   Used for:  Coronary artery disease involving native coronary artery of native heart without angina pectoris        Dose:  50 mg   Take 1 tablet (50 mg) by mouth daily   Quantity:  90 tablet   Refills:  3            Where to get your medicines      These medications were sent to [id number] - [place], [place] - [address]  [address], [place] [address]     Phone:  [phone]     atorvastatin 40 MG tablet    escitalopram 10 MG tablet    fluticasone 44 MCG/ACT Inhaler    isosorbide mononitrate 30 MG 24 hr tablet    levothyroxine 75 MCG tablet    umeclidinium-vilanterol 62.5-25 MCG/INH oral inhaler                Primary Care Provider Office Phone # Fax #    Frannie Allen [phone]       [address] [address]        Goals        General    Functional (pt-stated)     Notes - Note created  9/1/2015 11:20 AM by Janneth Orellana RN    I will rinse my mouth out after using my inhaler to help keep the lining of my mouth healthy  As of today's date 9/1/2015 goal is met at 0 - 25%.   Goal Status:  Ongoing      Medication (pt-stated)     Notes - Note edited  9/1/2015  2:10 PM by Janneth Orellana RN    I will use my inhalers as instructed  As of today's date 9/1/2015 goal is met at 0 - 25%.   Goal Status:  Activeedule  Appointment with ENT       Psychosocial (pt-stated)     Notes - Note edited  9/1/2015  2:10 PM by Janneth Orellana RN    I will  Investigate counseling and/or support groups if and when I am ready  to get formal support  As of today's date 9/1/2015 goal is met at 0 - 25%.   Goal Status:  Ongoing        Equal Access to Services     ALEXANDER RODRIGUEZ : Hadii aad ku hadbecky Chenali, irina christianebrooke, latricia murrieta, charline parker triciaurmila suresh laChristentobin roseGerard Carlos [facility] [phone].    ATENCIÓN: Si habla español, tiene a de la rosa disposición servicios gratuitos de asistencia lingüística. Llame al [phone].    We comply with applicable federal civil rights laws and [place] laws. We do not discriminate on the basis of race, color, national origin, age, disability, sex, sexual orientation, or gender identity.            Thank you!     Thank you for choosing [facility]  for your care. Our goal is always to provide you with excellent care. Hearing back from our patients is one way we can continue to improve our services. Please take a few minutes to complete the written survey that you may receive in the mail after your visit with us. Thank you!             Your Updated Medication List - Protect others around you: Learn how to safely use, store and throw away your medicines at www.disposemymeds.org.          This list is accurate as of 3/13/18  2:42 PM.  Always use your most recent med list.                   Brand Name Dispense Instructions for use Diagnosis    albuterol 108 (90 BASE) MCG/ACT Inhaler    PROAIR HFA/PROVENTIL HFA/VENTOLIN HFA    2 Inhaler    Inhale 2 puffs into the lungs every 6 hours as needed for shortness of breath / dyspnea    Chronic obstructive pulmonary disease, unspecified COPD type (H)       aspirin EC 81 MG EC tablet      Take 81 mg by mouth every morning        atorvastatin 40 MG tablet    LIPITOR    90 tablet    Take 1 tablet (40 mg) by mouth At Bedtime    Hyperlipidemia LDL goal <100       BRILINTA 60 MG tablet   Generic drug:  ticagrelor      Take 60 mg by mouth daily        buPROPion 200 MG 12 hr tablet    WELLBUTRIN SR    180 tablet    TAKE ONE TABLET BY MOUTH TWICE  DAILY.    Moderate episode of recurrent major depressive disorder (H)       diclofenac 1 % Gel topical gel    VOLTAREN    100 g    Apply  2 grams to hands four times daily using enclosed dosing card.    Primary osteoarthritis of both hands       escitalopram 10 MG tablet    LEXAPRO    90 tablet    Take 1 tablet (10 mg) by mouth daily due for office visit for further refills.    Moderate episode of recurrent major depressive disorder (H)       fluticasone 44 MCG/ACT Inhaler    FLOVENT HFA    1 Inhaler    Inhale 2 puffs into the lungs 2 times daily    Chronic obstructive pulmonary disease, unspecified COPD type (H)       isosorbide mononitrate 30 MG 24 hr tablet    IMDUR    180 tablet    Take 2 tablets (60 mg) by mouth daily    Coronary artery disease involving native coronary artery of native heart without angina pectoris       levothyroxine 75 MCG tablet    SYNTHROID/LEVOTHROID    90 tablet    Take 1 tablet (75 mcg) by mouth daily    Hypothyroidism, unspecified type       LORazepam 0.5 MG tablet    ATIVAN    1 tablet    TAKE 1/2-1 TABLET BY MOUTH EVERY 8 HOURS AS NEEDED FOR ANXIETY. DO NOT OPERATE A VEHICLE AFTER TAKING THIS MEDICATION    Anxiety       meclizine 25 MG tablet    ANTIVERT    30 tablet    Take 1 tablet (25 mg) by mouth every 6 hours as needed for dizziness    BPV (benign positional vertigo), left       metoprolol succinate 50 MG 24 hr tablet    TOPROL-XL    90 tablet    Take 1 tablet (50 mg) by mouth daily    Coronary artery disease involving native coronary artery of native heart without angina pectoris       nitroGLYcerin 0.4 MG sublingual tablet    NITROSTAT    25 tablet    Place 1 tablet (0.4 mg) under the tongue every 5 minutes as needed for chest pain    Coronary artery disease involving native coronary artery of native heart without angina pectoris       order for DME     1 Device    Equipment being ordered: incentive spirometry    Pneumonia of right middle lobe due to infectious organism (H)        pantoprazole 40 MG EC tablet    PROTONIX    180 tablet    TAKE 1 TABLET BY MOUTH 2 TIMES DAILY. TAKE BY MOUTH 30-60 MINUTES BEFORE A MEAL    GERD (gastroesophageal reflux disease)       umeclidinium-vilanterol 62.5-25 MCG/INH oral inhaler    ANORO ELLIPTA    1 Inhaler    Inhale 1 puff into the lungs daily    Chronic obstructive pulmonary disease, unspecified COPD type (H)

## 2018-03-14 RX ORDER — LEVOTHYROXINE SODIUM 50 UG/1
50 TABLET ORAL DAILY
Qty: 90 TABLET | Refills: 0 | Status: SHIPPED | OUTPATIENT
Start: 2018-03-14 | End: 2018-07-09

## 2018-03-14 ASSESSMENT — ANXIETY QUESTIONNAIRES: GAD7 TOTAL SCORE: 17

## 2018-03-14 ASSESSMENT — PATIENT HEALTH QUESTIONNAIRE - PHQ9: SUM OF ALL RESPONSES TO PHQ QUESTIONS 1-9: 16

## 2018-03-15 ENCOUNTER — RADIANT APPOINTMENT (OUTPATIENT)
Dept: ULTRASOUND IMAGING | Facility: CLINIC | Age: 68
End: 2018-03-15
Attending: NURSE PRACTITIONER
Payer: COMMERCIAL

## 2018-03-15 ENCOUNTER — TELEPHONE (OUTPATIENT)
Dept: FAMILY MEDICINE | Facility: CLINIC | Age: 68
End: 2018-03-15

## 2018-03-15 DIAGNOSIS — N83.202 CYST OF LEFT OVARY: ICD-10-CM

## 2018-03-15 DIAGNOSIS — J44.9 CHRONIC OBSTRUCTIVE PULMONARY DISEASE, UNSPECIFIED COPD TYPE (H): ICD-10-CM

## 2018-03-15 DIAGNOSIS — I73.9 CLAUDICATION (H): ICD-10-CM

## 2018-03-15 PROCEDURE — 93922 UPR/L XTREMITY ART 2 LEVELS: CPT

## 2018-03-15 PROCEDURE — 76830 TRANSVAGINAL US NON-OB: CPT

## 2018-03-15 PROCEDURE — 76856 US EXAM PELVIC COMPLETE: CPT

## 2018-03-15 NOTE — TELEPHONE ENCOUNTER
Per pharmacy, fluticasone (FLOVENT HFA) 44 MCG/ACT Inhaler is not covered by patients insurance. Pulmicort is the preferred Rx. Please change or send back to pool to start AIDEN CAMPOVERDE CMA ([facility])

## 2018-03-15 NOTE — LETTER
[facility]  [address] [place] [address]    March 19, 2018    Michela MERCADO [place]  [address] [address]    Dear Michela,    No significant peripheral vascular disease noted.       If you have any questions please feel free to contact [phone] or myself via [employer]elt[employer]t  Enclosed is a copy of your results.     Results for orders placed or performed in visit on 03/15/18   US ESTER Doppler No Exercise    Narrative    ULTRASOUND ANKLE-BRACHIAL INDEX DOPPLER NO EXERCISE, ONE-TWO LEVELS,  QUESTION BILATERAL  3/15/2018  2:53 PM     HISTORY: Claudication (H)    COMPARISON: None.    FINDINGS:  Right ESTER: 1.05.  Left ESTER: 0.93.      Impression    IMPRESSION:   1. Normal right ESTER without evidence of arterial insufficiency.  2. Left ESTER consistent with mild arterial insufficiency.    ESTER CRITERIA:  >0.95 Normal  0.90 - 0.94 Mild  0.5 - 0.89 Moderate  0.2 - 0.49 Severe  <0.2 Critical    SARIKA ARCE, DO       If you have any questions or concerns, please call myself or my nurse at [phone].    Sincerely,  Swapna Edwards, GOLDIE/barrett

## 2018-03-16 NOTE — TELEPHONE ENCOUNTER
Patient notified of Provider's message  Patient verbalized understanding.    Prescription sent    Coral Milner RN

## 2018-03-19 ENCOUNTER — TELEPHONE (OUTPATIENT)
Dept: INTERNAL MEDICINE | Facility: CLINIC | Age: 68
End: 2018-03-19

## 2018-03-19 NOTE — TELEPHONE ENCOUNTER
Notes Recorded by Swapna Edwards APRN CNP on 3/19/2018 at 2:37 PM  Please call patient-    Her cysts have grown since last exam.  Please encourage her to follow-up with her Ob/Gyn provider.    Thanks,  Swapna Edwards CNP    Voice mail left for patient to call RN hotline at [phone].    Ann Shafer RN

## 2018-03-19 NOTE — PROGRESS NOTES
Dear Michela,    Your recent test results are attached.      No significant peripheral vascular disease noted.      If you have any questions please feel free to contact [phone] or myself via Mindfr[employer]et.    Sincerely,  Swapna Edwards, CNP

## 2018-03-20 NOTE — TELEPHONE ENCOUNTER
Patient notified of providers message as written.  Patient verbalized understanding, no further questions or concerns.    Ann Shafer RN

## 2018-03-26 ENCOUNTER — TELEPHONE (OUTPATIENT)
Dept: INTERNAL MEDICINE | Facility: CLINIC | Age: 68
End: 2018-03-26

## 2018-03-26 NOTE — TELEPHONE ENCOUNTER
Reason for call:  Results   Name of test or procedure: Ultrasound results ovary    Additional comments: Please fax to patients Oncologist Dr. Huggins Fax [phone]    Phone number to reach patient:  Home number on file [phone] (home)    Best Time:  any    Can we leave a detailed message on this number?  YES   Maria Del Rosario Melendez,

## 2018-04-16 ENCOUNTER — TELEPHONE (OUTPATIENT)
Dept: ONCOLOGY | Facility: CLINIC | Age: 68
End: 2018-04-16

## 2018-04-16 DIAGNOSIS — Z01.818 PREOPERATIVE EXAMINATION: Primary | ICD-10-CM

## 2018-04-16 NOTE — TELEPHONE ENCOUNTER
Called patient per Khushi's request to ask her what surgery date she preferred. We decided on 4/30. Khushi will be calling to get her set up w/ her PAC appt the week beforehand.

## 2018-04-19 ENCOUNTER — TELEPHONE (OUTPATIENT)
Dept: INTERNAL MEDICINE | Facility: CLINIC | Age: 68
End: 2018-04-19

## 2018-04-19 NOTE — TELEPHONE ENCOUNTER
Left messages on both phone numbers for patient to call back in regards to completing PHQ9 over the phone as she is on our fail list.      Maureen Jaramillo, CMA

## 2018-04-25 NOTE — TELEPHONE ENCOUNTER
Contacted patient completed PHQ-9 score was 7 patient declined a office visit, patient states she is very satisfied with the medication and dosage she is taking and she states she is feeling a lot better than last time.   Thank you   Lashay.

## 2018-04-26 ENCOUNTER — ALLIED HEALTH/NURSE VISIT (OUTPATIENT)
Dept: SURGERY | Facility: CLINIC | Age: 68
End: 2018-04-26
Payer: COMMERCIAL

## 2018-04-26 ENCOUNTER — OFFICE VISIT (OUTPATIENT)
Dept: SURGERY | Facility: CLINIC | Age: 68
End: 2018-04-26
Payer: COMMERCIAL

## 2018-04-26 ENCOUNTER — ANESTHESIA EVENT (OUTPATIENT)
Dept: SURGERY | Facility: CLINIC | Age: 68
DRG: 742 | End: 2018-04-26
Payer: MEDICARE

## 2018-04-26 ENCOUNTER — APPOINTMENT (OUTPATIENT)
Dept: SURGERY | Facility: CLINIC | Age: 68
End: 2018-04-26
Payer: COMMERCIAL

## 2018-04-26 VITALS
DIASTOLIC BLOOD PRESSURE: 62 MMHG | WEIGHT: 134.3 LBS | BODY MASS INDEX: 26.37 KG/M2 | TEMPERATURE: 97.5 F | HEART RATE: 77 BPM | OXYGEN SATURATION: 97 % | HEIGHT: 60 IN | SYSTOLIC BLOOD PRESSURE: 107 MMHG

## 2018-04-26 DIAGNOSIS — Z01.818 PREOP EXAMINATION: Primary | ICD-10-CM

## 2018-04-26 DIAGNOSIS — N83.209 CYST OF OVARY, UNSPECIFIED LATERALITY: ICD-10-CM

## 2018-04-26 LAB
CREAT SERPL-MCNC: 1.01 MG/DL (ref 0.52–1.04)
GFR SERPL CREATININE-BSD FRML MDRD: 55 ML/MIN/1.7M2
HGB BLD-MCNC: 13.1 G/DL (ref 11.7–15.7)
POTASSIUM SERPL-SCNC: 4.3 MMOL/L (ref 3.4–5.3)

## 2018-04-26 PROCEDURE — 86900 BLOOD TYPING SEROLOGIC ABO: CPT | Performed by: PHYSICIAN ASSISTANT

## 2018-04-26 PROCEDURE — 84132 ASSAY OF SERUM POTASSIUM: CPT | Performed by: PHYSICIAN ASSISTANT

## 2018-04-26 PROCEDURE — 82565 ASSAY OF CREATININE: CPT | Performed by: PHYSICIAN ASSISTANT

## 2018-04-26 PROCEDURE — 86901 BLOOD TYPING SEROLOGIC RH(D): CPT | Performed by: PHYSICIAN ASSISTANT

## 2018-04-26 PROCEDURE — 86850 RBC ANTIBODY SCREEN: CPT | Performed by: PHYSICIAN ASSISTANT

## 2018-04-26 PROCEDURE — 85018 HEMOGLOBIN: CPT | Performed by: PHYSICIAN ASSISTANT

## 2018-04-26 ASSESSMENT — COPD QUESTIONNAIRES
CAT_SEVERITY: MODERATE
COPD: 1

## 2018-04-26 ASSESSMENT — PATIENT HEALTH QUESTIONNAIRE - PHQ9: SUM OF ALL RESPONSES TO PHQ QUESTIONS 1-9: 7

## 2018-04-26 ASSESSMENT — LIFESTYLE VARIABLES: TOBACCO_USE: 1

## 2018-04-26 NOTE — ANESTHESIA PREPROCEDURE EVALUATION
"  Anesthesia Evaluation     . Pt has had prior anesthetic. Type: General and MAC    History of anesthetic complications   - PONV        ROS/MED HX    ENT/Pulmonary:     (+)FAWN risk factors snores loudly, hypertension, tobacco use, Past use moderate COPD, , . .    Neurologic:     (+)neuropathy - In the fingers of both hands.,     Cardiovascular: Comment: Reports episodes of chest pain that occur 2-3 times/week for the last few years.  They are unpredictable in onset.  Located mid-sternal no radiation, \"like someone \"grabbing me\"  Episodes last several minutes.  Taking a deep breath makes the pain worse, NTG does not make it any better.  Unable to identify any other palliating factors.  States that these painsare  DEFINITELY different than the episodes which led to coronary angiograms and intervention.    Same exercise tolerance since stress test and is able to climb 2 flights of stairs    (+) Dyslipidemia, hypertension--CAD, angina-past MI,-stent,2014  4 Drug Eluting Stent .. Taking blood thinners Pt has not received instructions: . . MEYERS, . :. . Previous cardiac testing date:results:Stress Testdate:2016 results:ECG reviewed date:8/2017 results:Sinus Bradycardia date: results:          METS/Exercise Tolerance:  4 - Raking leaves, gardening   Hematologic:     (+) History of Transfusion no previous transfusion reaction Other Hematologic Disorder-History of idiopathic thrombocytopenic purpura, treated with spleenectomy and radioactive thyroid therapy      Musculoskeletal:  - neg musculoskeletal ROS       GI/Hepatic:     (+) GERD Asymptomatic on medication,       Renal/Genitourinary:  - ROS Renal section negative       Endo:     (+) thyroid problem hypothyroidism, .      Psychiatric:     (+) psychiatric history anxiety and depression      Infectious Disease:  - neg infectious disease ROS       Malignancy:      - no malignancy   Other:    (+) no H/O Chronic Pain,  - neg other ROS                 Physical Exam  Normal " systems: pulmonary    Airway   Mallampati: I  TM distance: >3 FB  Neck ROM: full    Dental   (+) upper dentures and partials    Cardiovascular   Rhythm and rate: regular and normal      Pulmonary    breath sounds clear to auscultation               PAC Discussion and Assessment    ASA Classification: 3  Case is suitable for: [place]  Anesthetic techniques and relevant risks discussed: GA  Invasive monitoring and risk discussed:   Types:   Possibility and Risk of blood transfusion discussed:   NPO instructions given:   Additional anesthetic preparation and risks discussed:   Needs early admission to pre-op area:   Other:     PAC Resident/NP Anesthesia Assessment:  Michela Cole is a 66 year old female who presents for pre-operative H & P in preparation for Laparoscopic Bilateral Salpingo-Oophorectomy, Possible Cancer Staging and Tumor Debulking with Hysterectomy, Lymphadenectomy, Possible Open on 4/30/18 with Dr. Melody Bolivar for an ovarian cyst at [facility].    PAC referral for risk assessment and optimization for anesthesia with comorbid conditions of:    Pre-operative considerations:    1.  Cardiac:  Functional status METS =3    Risk of Major Adverse Cardiac event: 6.6%  - Coronary artery disease with intermittent episodes of chest pain(Has had for a few years); History of MI s/p 4-MARQUES and PCI to a circumflex coronary artery 2014   *continue Imdur DOS  -HTN well controlled with metoprolol(cont DOS); HLD on statin(cont DOS)      *Cardiac Stress MRI 2016:  1.  Normal Lexiscan stress MRI. There is no evidence of ischemia.  2.  The gadolinium delayed enhancement showed no evidence of scar or fibrosis in the myocardium.  3.  The left ventricle size is normal with normal wall thickness.  The systolic function is normal with a calculated ejection fraction of 56.7%.    4.  The right ventricle is normal in size and wall thickness with normal systolic function,  calculated ejection fraction 50.2%.    5.  Collectively these findings are consistent with a normal stress cardiac MRI.    *CATH 2014:  Right dominant coronary artery system.  The previously deployed stent in the proximal LAD artery is widely patent.  50% stenosis in the ostial Circumflex   50% stenosis in the mid RCA.  INTERVENTION:  Intracoronary ultrasound was used for lesion assessment.  Successful angioplasty of the ostial circumflex.  Successful angioplasty of the ostial LAD.    *EKG 8/2017:  Sinus Bradycardia  2.  Pulm:   FAWN risk:  Intermediate  -COPD controlled with ANORO ELLIPTA qd, and FLOVENT HFA bid   3.  GI:  PONV  4.  Meds:  -antiPLTs:  Brilinta, patient started holding 4/24/18, asa 81 mg, patient started holding 4/24/18    Reviewed 8/30/17 note from Dr. Duff with [facility] [place].  The patient does not require any further cardiac evaluation prior to the planned procedure.  The patient is to hold her Aspirin and (Effient) as instructed by Dr. Bolivar.      Discussed the above A/P with Dr. Sinclair.  Patient is optimized and is an acceptable candidate for the proposed procedure.  No further diagnostic evaluation is needed.    Olivia WOO-MILAGROS  04/26/18 2:53 PM        Mid-Level Provider/Resident:   Date:   Time:     Attending Anesthesiologist Anesthesia Assessment:  I saw the patient and discussed with the SANJEEV as above. In short, this is a 67 year old year-old female ASA: 3 presenting for pre-operative evaluation prior to Lap hysterectomy/BSO, tumor debulking with possible lymphadenectomy with Dr. Bolivar due to complex ovarian cyst.    Today we discussed her complex past cardiac hx including CAD with MARQUES x4 with PCI to Cx in 2014. She has since been stable on Brillinta and ASA with stable functional status. She is also a past smoker, carrying a dx of COPD, well controlled on daily Anoro Ellipta.  AIRWAY: feasible, upper dentures with lower partials, MP1 with good  mouth opening and F/E of c-spine.  ENDO: pt carries dx of Graves s/p radiation. Now on synthroid.    Otherwise, I believe the patient to be medically optimized for the above procedure.     Final anesthetic plan and recommendations to be made by the attending anesthesiologist on the day of surgery.     Thank you for including me in the care for this patient.  -------  Ernst aCrdona DO, MSc  Anesthesia Resident, PGY2      Reviewed and Signed by PAC Anesthesiologist  Anesthesiologist: james  Date: 4/26/2018  Time:   Pass/Fail: Pass  Disposition:     PAC Pharmacist Assessment:        Pharmacist:   Date:   Time:      Anesthesia Plan      History & Physical Review  History and physical reviewed and following examination; no interval change.    ASA Status:  3 .    NPO Status:  > 8 hours    Plan for General and ETT with Intravenous induction. Maintenance will be Balanced.    PONV prophylaxis:  Ondansetron (or other 5HT-3) and Dexamethasone or Solumedrol  Additional equipment: 2nd IV and Arterial Line No change in exercise tolerance since stress test       Postoperative Care  Postoperative pain management:  Multi-modal analgesia.      Consents  Anesthetic plan, risks, benefits and alternatives discussed with:  Patient.  Use of blood products discussed: Yes.   Use of blood products discussed with Patient.  Consented to blood products.  .                          .

## 2018-04-26 NOTE — PATIENT INSTRUCTIONS
Preparing for Your Surgery      Name:  Michela Cole   MRN:  [id number]   :  1950   Today's Date:  2018     Arriving for surgery:  Surgery date:    Arrival time:  11:00AM  Please come to:       [facility] [address]  [address] [place]  [address]    -   parking is available in front of the hospital from 5:15 am to 8:00 pm    -  Stop at the Information Desk in the lobby    -   Inform the information person that you are here for surgery. An escort to 3c will be provided. If you would not like an escort, please proceed to 3C on the 3rd floor. [phone]     What can I eat or drink?  -  You may have solid food or milk products until 8 hours prior to your surgery--do not eat food after 5:00AM on the morning of surgery   -  You may have water, apple juice or 7up/Sprite until 2 hours prior to your surgery--OK to have CLEAR liquids until 11:00AM on the day of surgery     Which medicines can I take?  -  Do NOT take these medications in the morning, the day of surgery:        Stop brilinta 5 days prior to surgery   Stop aspirin 7 days prior to surgery           -  Please take these medications the day of surgery:        Use inhalers on the morning of surgery as needed  And do bring ventolin inhaler to hospital   wellbutrin   lexapro  imdur  Levothyroxine  Metoprolol  protonix           How do I prepare myself?  -  Take two showers: one the night before surgery; and one the morning of surgery.         Use Scrubcare or Hibiclens to wash from neck down.  You may use your own shampoo and conditioner. No other hair products.   -  Do NOT use lotion, powder, deodorant, or antiperspirant the day of your surgery.  -  Do NOT wear any makeup, fingernail polish or jewelry.  -  Begin using Incentive Spirometer 1 week prior to surgery.  Use 4 times per day, up to 5 breaths each time.  Bring Incentive Spirometer to hospital.  -Do not bring your own medications to the  hospital, except for inhalers and eye drops.  -  Bring your ID and insurance card.    Questions or Concerns:  If you have questions or concerns regarding the day of surgery, please call the Preoperative Assessment Center (PAC), Monday-Friday 7AM-7PM:  [phone].  After surgery please call your surgeons office.             AFTER YOUR SURGERY  Breathing exercises   Breathing exercises help you recover faster. Take deep breaths and let the air out slowly. This will:     Help you wake up after surgery.    Help prevent complications like pneumonia.  Preventing complications will help you go home sooner.   We may give you a breathing device (incentive spirometer) to encourage you to breathe deeply.   Nausea and vomiting   You may feel sick to your stomach after surgery; if so, let your nurse know.    Pain control:  After surgery, you may have pain. Our goal is to help you manage your pain. Pain medicine will help you feel comfortable enough to do activities that will help you heal.  These activities may include breathing exercises, walking and physical therapy.   To help your health care team treat your pain we will ask: 1) If you have pain  2) where it is located 3) describe your pain in your words  Methods of pain control include medications given by mouth, vein or by nerve block for some surgeries.  We may give you a pain control pump that will:  1) Deliver the medicine through a tube placed in your vein  2) Control the amount of medicine you receive  3) Allow you to push a button to deliver a dose of pain medicine  Sequential Compression Device (SCD) or Pneumo Boots:  You may need to wear SCD S on your legs or feet. These are wraps connected to a machine that pumps in air and releases it. The repeated pumping helps prevent blood clots from forming.   Using an Incentive Spirometer    An incentive spirometer is a device that helps you do deep breathing exercises. These exercises expand your lungs, aid in circulation,  and help prevent pneumonia. Deep breathing exercises also help you breathe better and improve the function of your lungs by:    Keeping your lungs clear    Strengthening your breathing muscles    Helping prevent respiratory complications or problems  The incentive spirometer gives you a way to take an active part in your care. A nurse or therapist will teach you breathing exercises. To do these exercises, you will breathe in through your mouth and not your nose. The incentive spirometer only works correctly if you breathe in through your mouth.    Steps to clear lungs  Step 1. Exhale normally. Then, inhale normally.    Relax and breathe out.  Step 2. Place your lips tightly around the mouthpiece.    Make sure the device is upright and not tilted.  Step 3. Inhale as much air as you can through the mouthpiece (don't breath through your nose).    Inhale slowly and deeply.    Hold your breath long enough to keep the balls or disk raised for at least 3 to 5 seconds, or as instructed by your healthcare provider.  Step 4. Repeat the exercise regularly.    Begin using the Incentive Spirometer one week prior to your surgery, 4 times per day-5 times each.

## 2018-04-26 NOTE — H&P
Pre-Operative H & P     CC:  Preoperative exam to assess for increased cardiopulmonary risk while undergoing surgery and anesthesia.    Date of Encounter: April 26, 2018   Primary Care Physician:  Frannie Allen   Reason for Visit/Surgery:  Cyst of ovary, unspecified laterality [N83.209]      THIAGO Cole is a 66 year old female who presents for pre-operative H & P in preparation for Laparoscopic Bilateral Salpingo-Oophorectomy, Possible Cancer Staging and Tumor Debulking with Hysterectomy, Lymphadenectomy, Possible Open on 4/30/18 with Dr. Melody Bolivar for an ovarian cyst at [facility].      The patient was first evaluated by Dr. Bolivar in 2015 for an incidental adnexal mass noted on imaging.  She has no related symptoms and has been followed with serial imaging since this time.  On the US last month it was noted that the cyst has been progressively larger and considered complex.  Dr. Bolivar recommended  removal for diagnosis and treatment, so the above surgery has been scheduled.      History is obtained from the patient and  medical record including Care Everywhere.        Past Medical History  Past Medical History:   Diagnosis Date     CAD (coronary artery disease) 09/26/2014    s/p 4 MARQUES 2014     COPD (chronic obstructive pulmonary disease) (H)      Family history of sudden cardiac death (SCD)      GERD (gastroesophageal reflux disease) 2/4/2010     H/O idiopathic thrombocytopenic purpura      History of blood transfusion 2005    x 2     History of Graves' disease 1978     History of myocardial infarction 2014     History of sepsis 2015     Hyperlipidemia LDL goal <130 9/23/2009     Hypothyroidism 9/23/2009     Major depression      PONV (postoperative nausea and vomiting)         Past Surgical History  Past Surgical History:   Procedure Laterality Date     APPENDECTOMY       C/SECTION, LOW TRANSVERSE      x 4     CORONARY ANGIOGRAPHY ADULT ORDER       Total of 5 coronary angiograms     HC DRUG-ELUTING STENTS, SINGLE  2014     SPLENECTOMY  1978    ITP     TUBAL LIGATION      x2       Hx of Blood transfusions/reactions: No reactions     Personal or FH with difficulty with Anesthesia:  PONV    Prior to Admission Medications  Current Outpatient Prescriptions   Medication Sig Dispense Refill     albuterol (PROAIR HFA/PROVENTIL HFA/VENTOLIN HFA) 108 (90 BASE) MCG/ACT Inhaler Inhale 2 puffs into the lungs every 6 hours as needed for shortness of breath / dyspnea 2 Inhaler 2     aspirin EC 81 MG tablet Take 81 mg by mouth every morning        atorvastatin (LIPITOR) 40 MG tablet Take 1 tablet (40 mg) by mouth At Bedtime 90 tablet 1     BRILINTA 60 MG tablet Take 60 mg by mouth daily  11     buPROPion (WELLBUTRIN SR) 200 MG 12 hr tablet TAKE ONE TABLET BY MOUTH TWICE DAILY. 180 tablet 1     diclofenac (VOLTAREN) 1 % GEL topical gel Apply  2 grams to hands four times daily using enclosed dosing card. 100 g 0     escitalopram (LEXAPRO) 10 MG tablet Take 1 tablet (10 mg) by mouth daily due for office visit for further refills. 90 tablet 1     isosorbide mononitrate (IMDUR) 30 MG 24 hr tablet Take 2 tablets (60 mg) by mouth daily 180 tablet 1     levothyroxine (SYNTHROID/LEVOTHROID) 50 MCG tablet Take 1 tablet (50 mcg) by mouth daily 90 tablet 0     metoprolol (TOPROL-XL) 50 MG 24 hr tablet Take 1 tablet (50 mg) by mouth daily (Patient taking differently: Take 50 mg by mouth every morning ) 90 tablet 3     pantoprazole (PROTONIX) 40 MG EC tablet TAKE 1 TABLET BY MOUTH 2 TIMES DAILY. TAKE BY MOUTH 30-60 MINUTES BEFORE A MEAL 180 tablet 2     umeclidinium-vilanterol (ANORO ELLIPTA) 62.5-25 MCG/INH oral inhaler Inhale 1 puff into the lungs daily 1 Inhaler 5     LORazepam (ATIVAN) 0.5 MG tablet TAKE 1/2-1 TABLET BY MOUTH EVERY 8 HOURS AS NEEDED FOR ANXIETY. DO NOT OPERATE A VEHICLE AFTER TAKING THIS MEDICATION 1 tablet 0     nitroglycerin (NITROSTAT) 0.4 MG sublingual tablet  "Place 1 tablet (0.4 mg) under the tongue every 5 minutes as needed for chest pain 25 tablet 11     order for DME Equipment being ordered: incentive spirometry 1 Device 0         Allergies  Sulfa drugs; Plavix [clopidogrel]; and Sulfasalazine     Social History  Social History     Social History     Marital status:      Spouse name: N/A     Number of children: N/A     Years of education: N/A     Occupational History     Not on file.     Social History Main Topics     Smoking status: Former Smoker     Packs/day: 0.25     Years: 42.00     Types: Cigarettes     Quit date: 6/24/2013     Smokeless tobacco: Never Used     Alcohol use 3.6 oz/week     0 Standard drinks or equivalent, 6 Cans of beer per week      Comment: A couple times a week, \"three beers max\"     Drug use: No     Sexual activity: Yes     Partners: Male     Birth control/ protection: Post-menopausal, Female Surgical     Other Topics Concern     Not on file     Social History Narrative          Family History  No family history of bleeding, clotting disorders or complications with anesthesia.      ROS   The complete review of systems is negative other than noted in the HPI or here.   Constitutional: Denies  fevers/chills.    EENT: Denies difficulty  swallowing.  Cardiovascular: intermittent CP for the last few years;  Denies MEYERS or orthopnea, palpitations or syncope.  Respiratory: Denies significant shortness of breath or cough.    GI: Denies frequent heartburn, nausea/vomiting     : Denies dysuria   Musculoskeletal: Denies joint pain or swelling.    Skin: Denies rashes, infection or wounds.    Hematologic: Denies prolonged bleeding, anemia or blood clot history  Neurologic: Denies history of stroke, TIA, migraines, seizures, dizziness, ; denies short term memory loss  Psychiatric: Denies changes in mood or affect.      Cardiology Tests: (personally reviewed):   Review Results Below in A/P    Labs: (personally reviewed):  Lab Results   Component " Value Date    WBC 6.2 08/29/2017    HGB 13.1 04/26/2018    IRON 106 11/02/2015    IRONSAT 35 11/02/2015    HCT 39.3 08/29/2017     08/29/2017    INR 1.1 10/23/2015     08/29/2017    POTASSIUM 4.3 04/26/2018    RAZIA 9.4 08/29/2017     (H) 08/29/2017    CR 1.01 04/26/2018    BUN 18 08/29/2017    CO2 30 08/29/2017    ALT 25 08/29/2017    AST 20 08/29/2017    BILITOTAL 1.6 (H) 08/29/2017    ALKPHOS 81 08/29/2017          Physical Exam:  No LMP recorded. Patient is postmenopausal.   Vital signs:  /62  Pulse 77  Temp 97.5  F (36.4  C)  Ht 1.524 m (5')  Wt 60.9 kg (134 lb 4.8 oz)  SpO2 97%  BMI 26.23 kg/m2    Constitutional: Awake, alert, cooperative, no apparent distress, and appears stated age.  Eyes: Pupils equal, round and reactive to light, sclera clear, conjunctiva normal.  HENT: Normocephalic, oral pharynx with moist mucus membranes. No goiter appreciated.   Respiratory: Clear to auscultation bilaterally, no crackles or wheezing.  Cardiovascular: Regular rate and rhythm and no overt murmur noted.  No carotid bruits auscultated. No edema. Palpable pulses to radial  DP and PT arteries.   GI: Normal bowel sounds, soft, non-distended, non-tender, no masses palpated  Skin: Warm and dry.  No rashes at anticipated surgical site.   Musculoskeletal: Full extension of the neck.  No redness, warmth, or swelling of the joints noted. Gross motor strength is normal.    Neurologic: Awake, alert, oriented to name, place and time.  Gait is normal.   Neuropsychiatric: Calm, cooperative. Normal affect.     Assessment/Plan  Michela Cole is a 66 year old female who presents for pre-operative H & P in preparation for Laparoscopic Bilateral Salpingo-Oophorectomy, Possible Cancer Staging and Tumor Debulking with Hysterectomy, Lymphadenectomy, Possible Open on 4/30/18 with Dr. Melody Bolivar for an ovarian cyst at [facility].    PAC referral for risk assessment  and optimization for anesthesia with comorbid conditions of:    Pre-operative considerations:    1.  Cardiac:  Functional status METS =3    Risk of Major Adverse Cardiac event: 6.6%  - Coronary artery disease with intermittent episodes of chest pain(Has had for a few years); History of MI s/p 4-MARQUES and PCI to a circumflex coronary artery 2014   *continue Imdur DOS  -HTN well controlled with metoprolol(cont DOS); HLD on statin(cont DOS)      *Cardiac Stress MRI 2016:  1.  Normal Lexiscan stress MRI. There is no evidence of ischemia.  2.  The gadolinium delayed enhancement showed no evidence of scar or fibrosis in the myocardium.  3.  The left ventricle size is normal with normal wall thickness.  The systolic function is normal with a calculated ejection fraction of 56.7%.    4.  The right ventricle is normal in size and wall thickness with normal systolic function, calculated ejection fraction 50.2%.    5.  Collectively these findings are consistent with a normal stress cardiac MRI.    *CATH 2014:  Right dominant coronary artery system.  The previously deployed stent in the proximal LAD artery is widely patent.  50% stenosis in the ostial Circumflex   50% stenosis in the mid RCA.  INTERVENTION:  Intracoronary ultrasound was used for lesion assessment.  Successful angioplasty of the ostial circumflex.  Successful angioplasty of the ostial LAD.    *EKG 8/2017:  Sinus Bradycardia  2.  Pulm:   FAWN risk:  Intermediate  -COPD controlled with ANORO ELLIPTA qd, and FLOVENT HFA bid   3.  GI:  PONV  4.  Meds:  -antiPLTs:  Brilinta, patient started holding 4/24/18, asa 81 mg, patient started holding 4/24/18    Reviewed 8/30/17 note from Dr. Duff with [facility] [place].  The patient does not require any further cardiac evaluation prior to the planned procedure.  The patient is to hold her Aspirin and [Effient] as instructed by Dr. Bolivar.      Discussed the above A/P with Dr. Sinclair.  Patient is  optimized and is an acceptable candidate for the proposed procedure.  No further diagnostic evaluation is needed.      AVS given to patient regarding medication instructions,  surgery time/arrival time and NPO status.  Olivia Ba MS PA-C   Preoperative Assessment Center  [facility]  Clinic and Surgery Center  Phone: [phone]  Fax: [phone]

## 2018-04-26 NOTE — MR AVS SNAPSHOT
After Visit Summary   2018    Michela Cole    MRN: [id number]           Patient Information     Date Of Birth          1950        Visit Information        Provider Department      2018 2:30 PM Rn, [facility]        Care Instructions    Preparing for Your Surgery      Name:  Michela Cole   MRN:  [id number]   :  1950   Today's Date:  2018     Arriving for surgery:  Surgery date:    Arrival time:  11:00AM  Please come to:       [facility] [address]  [address] [place]  [address]    -   parking is available in front of the hospital from 5:15 am to 8:00 pm    -  Stop at the Information Desk in the lobby    -   Inform the information person that you are here for surgery. An escort to 3c will be provided. If you would not like an escort, please proceed to 3C on the 3rd floor. [phone]     What can I eat or drink?  -  You may have solid food or milk products until 8 hours prior to your surgery--do not eat food after 5:00AM on the morning of surgery   -  You may have water, apple juice or 7up/Sprite until 2 hours prior to your surgery--OK to have CLEAR liquids until 11:00AM on the day of surgery     Which medicines can I take?  -  Do NOT take these medications in the morning, the day of surgery:        Stop brilinta 5 days prior to surgery   Stop aspirin 7 days prior to surgery           -  Please take these medications the day of surgery:        Use inhalers on the morning of surgery as needed  And do bring ventolin inhaler to hospital   wellbutrin   lexapro  imdur  Levothyroxine  Metoprolol  protonix           How do I prepare myself?  -  Take two showers: one the night before surgery; and one the morning of surgery.         Use Scrubcare or Hibiclens to wash from neck down.  You may use your own shampoo and conditioner. No other hair products.   -  Do NOT use lotion,  powder, deodorant, or antiperspirant the day of your surgery.  -  Do NOT wear any makeup, fingernail polish or jewelry.  -  Begin using Incentive Spirometer 1 week prior to surgery.  Use 4 times per day, up to 5 breaths each time.  Bring Incentive Spirometer to hospital.  -Do not bring your own medications to the hospital, except for inhalers and eye drops.  -  Bring your ID and insurance card.    Questions or Concerns:  If you have questions or concerns regarding the day of surgery, please call the Preoperative Assessment Center (PAC), Monday-Friday 7AM-7PM:  [phone].  After surgery please call your surgeons office.             AFTER YOUR SURGERY  Breathing exercises   Breathing exercises help you recover faster. Take deep breaths and let the air out slowly. This will:     Help you wake up after surgery.    Help prevent complications like pneumonia.  Preventing complications will help you go home sooner.   We may give you a breathing device (incentive spirometer) to encourage you to breathe deeply.   Nausea and vomiting   You may feel sick to your stomach after surgery; if so, let your nurse know.    Pain control:  After surgery, you may have pain. Our goal is to help you manage your pain. Pain medicine will help you feel comfortable enough to do activities that will help you heal.  These activities may include breathing exercises, walking and physical therapy.   To help your health care team treat your pain we will ask: 1) If you have pain  2) where it is located 3) describe your pain in your words  Methods of pain control include medications given by mouth, vein or by nerve block for some surgeries.  We may give you a pain control pump that will:  1) Deliver the medicine through a tube placed in your vein  2) Control the amount of medicine you receive  3) Allow you to push a button to deliver a dose of pain medicine  Sequential Compression Device (SCD) or Pneumo Boots:  You may need to wear SCD S on your  legs or feet. These are wraps connected to a machine that pumps in air and releases it. The repeated pumping helps prevent blood clots from forming.   Using an Incentive Spirometer    An incentive spirometer is a device that helps you do deep breathing exercises. These exercises expand your lungs, aid in circulation, and help prevent pneumonia. Deep breathing exercises also help you breathe better and improve the function of your lungs by:    Keeping your lungs clear    Strengthening your breathing muscles    Helping prevent respiratory complications or problems  The incentive spirometer gives you a way to take an active part in your care. A nurse or therapist will teach you breathing exercises. To do these exercises, you will breathe in through your mouth and not your nose. The incentive spirometer only works correctly if you breathe in through your mouth.    Steps to clear lungs  Step 1. Exhale normally. Then, inhale normally.    Relax and breathe out.  Step 2. Place your lips tightly around the mouthpiece.    Make sure the device is upright and not tilted.  Step 3. Inhale as much air as you can through the mouthpiece (don't breath through your nose).    Inhale slowly and deeply.    Hold your breath long enough to keep the balls or disk raised for at least 3 to 5 seconds, or as instructed by your healthcare provider.  Step 4. Repeat the exercise regularly.    Begin using the Incentive Spirometer one week prior to your surgery, 4 times per day-5 times each.          Follow-ups after your visit        Your next 10 appointments already scheduled     Apr 26, 2018  3:00 PM CDT   (Arrive by 2:45 PM)   PAC Anesthesia Consult with  Pac Anesthesiologist   [facility] ([facility])    [address] [place] [id number] [phone]            Apr 26, 2018  3:15 PM CDT   [facility] Draw with  [facility] D[facility]   [facility] Draw ([facility]  [facility])    [address]  [address]  [facility] [address] [phone]            2018   Procedure with Melody Bolivar MD   [facility] [place], Same Day Surgery (--)    [address]  [place] [address]   [phone]            May 15, 2018  1:00 PM CDT   (Arrive by 12:45 PM)   Return Visit with Melody Bolivar MD   [facility] ([facility])    [address]  [address]  [facility] [address] [phone]              Future tests that were ordered for you today     Open Future Orders        Priority Expected Expires Ordered    ABO/Rh type and screen Routine 2018    Hemoglobin Routine 2018    Potassium Routine 2018    Creatinine Routine 2018            Who to contact     Please call your clinic at [phone] to:    Ask questions about your health    Make or cancel appointments    Discuss your medicines    Learn about your test results    Speak to your doctor            Additional Information About Your Visit        [employer] - Payments Anytime | Anywherehart Information     [employer] is an electronic gateway that provides easy, online access to your medical records. With "[employer] you can request a clinic appointment, read your test results, renew a prescription or communicate with your care team.     To sign up for [employer] visit the website at [email or web address]   You will be asked to enter the access code listed below, as well as some personal information. Please follow the directions to create your username and password.     Your access code is: [email or web address]  Expires: 2018  2:42 PM     Your access code will  in 90 days. If you need help or a new code, please contact your [facility] or call [phone] for assistance.        Care EveryWhere ID     This is your Care EveryWhere ID. This could be used by  other organizations to access your [facility] medical records  [id number]         Blood Pressure from Last 3 Encounters:   04/26/18 107/62   03/13/18 128/70   12/13/17 124/76    Weight from Last 3 Encounters:   04/26/18 60.9 kg (134 lb 4.8 oz)   03/13/18 59.4 kg (131 lb)   12/13/17 58.2 kg (128 lb 3.2 oz)              Today, you had the following     No orders found for display         Today's Medication Changes          These changes are accurate as of 4/26/18  2:34 PM.  If you have any questions, ask your nurse or doctor.               These medicines have changed or have updated prescriptions.        Dose/Directions    metoprolol succinate 50 MG 24 hr tablet   Commonly known as:  TOPROL-XL   This may have changed:  when to take this   Used for:  Coronary artery disease involving native coronary artery of native heart without angina pectoris        Dose:  50 mg   Take 1 tablet (50 mg) by mouth daily   Quantity:  90 tablet   Refills:  3                Primary Care Provider Office Phone # Fax #    Frannie Sharon Allen [phone]       [address] [facility] [address]        Goals        General    Functional (pt-stated)     Notes - Note created  9/1/2015 11:20 AM by Janneth Orellana RN    I will rinse my mouth out after using my inhaler to help keep the lining of my mouth healthy  As of today's date 9/1/2015 goal is met at 0 - 25%.   Goal Status:  Ongoing      Medication (pt-stated)     Notes - Note edited  9/1/2015  2:10 PM by Janneth Orellana RN    I will use my inhalers as instructed  As of today's date 9/1/2015 goal is met at 0 - 25%.   Goal Status:  Activeedule  Appointment with ENT       Psychosocial (pt-stated)     Notes - Note edited  9/1/2015  2:10 PM by Janneth Orellana RN    I will  Investigate counseling and/or support groups if and when I am ready to get formal support  As of today's date 9/1/2015 goal is met at 0 - 25%.   Goal Status:  Ongoing        Equal Access to Services      ALEXANDER RODRIGUEZ : Hadii aad ku marilia Quispe, waaxda luqadaha, qaybta kaalmada adeseth, charline kristen meetargentina stantongenesisabhinav leblanc . So [facility] [phone].    ATENCIÓN: Si habla español, tiene a de la rosa disposición servicios gratuitos de asistencia lingüística. Llame al [phone].    We comply with applicable federal civil rights laws and [place] laws. We do not discriminate on the basis of race, color, national origin, age, disability, sex, sexual orientation, or gender identity.            Thank you!     Thank you for choosing [facility]  for your care. Our goal is always to provide you with excellent care. Hearing back from our patients is one way we can continue to improve our services. Please take a few minutes to complete the written survey that you may receive in the mail after your visit with us. Thank you!             Your Updated Medication List - Protect others around you: Learn how to safely use, store and throw away your medicines at www.disposemymeds.org.          This list is accurate as of 4/26/18  2:34 PM.  Always use your most recent med list.                   Brand Name Dispense Instructions for use Diagnosis    albuterol 108 (90 Base) MCG/ACT Inhaler    PROAIR HFA/PROVENTIL HFA/VENTOLIN HFA    2 Inhaler    Inhale 2 puffs into the lungs every 6 hours as needed for shortness of breath / dyspnea    Chronic obstructive pulmonary disease, unspecified COPD type (H)       aspirin EC 81 MG EC tablet      Take 81 mg by mouth every morning        atorvastatin 40 MG tablet    LIPITOR    90 tablet    Take 1 tablet (40 mg) by mouth At Bedtime    Hyperlipidemia LDL goal <100       BRILINTA 60 MG tablet   Generic drug:  ticagrelor      Take 60 mg by mouth daily        buPROPion 200 MG 12 hr tablet    WELLBUTRIN SR    180 tablet    TAKE ONE TABLET BY MOUTH TWICE DAILY.    Moderate episode of recurrent major depressive disorder (H)       diclofenac 1 % Gel topical gel    VOLTAREN     100 g    Apply  2 grams to hands four times daily using enclosed dosing card.    Primary osteoarthritis of both hands       escitalopram 10 MG tablet    LEXAPRO    90 tablet    Take 1 tablet (10 mg) by mouth daily due for office visit for further refills.    Moderate episode of recurrent major depressive disorder (H)       isosorbide mononitrate 30 MG 24 hr tablet    IMDUR    180 tablet    Take 2 tablets (60 mg) by mouth daily    Coronary artery disease involving native coronary artery of native heart without angina pectoris       levothyroxine 50 MCG tablet    SYNTHROID/LEVOTHROID    90 tablet    Take 1 tablet (50 mcg) by mouth daily    Hypothyroidism, unspecified type       LORazepam 0.5 MG tablet    ATIVAN    1 tablet    TAKE 1/2-1 TABLET BY MOUTH EVERY 8 HOURS AS NEEDED FOR ANXIETY. DO NOT OPERATE A VEHICLE AFTER TAKING THIS MEDICATION    Anxiety       metoprolol succinate 50 MG 24 hr tablet    TOPROL-XL    90 tablet    Take 1 tablet (50 mg) by mouth daily    Coronary artery disease involving native coronary artery of native heart without angina pectoris       nitroGLYcerin 0.4 MG sublingual tablet    NITROSTAT    25 tablet    Place 1 tablet (0.4 mg) under the tongue every 5 minutes as needed for chest pain    Coronary artery disease involving native coronary artery of native heart without angina pectoris       order for DME     1 Device    Equipment being ordered: incentive spirometry    Pneumonia of right middle lobe due to infectious organism (H)       pantoprazole 40 MG EC tablet    PROTONIX    180 tablet    TAKE 1 TABLET BY MOUTH 2 TIMES DAILY. TAKE BY MOUTH 30-60 MINUTES BEFORE A MEAL    GERD (gastroesophageal reflux disease)       umeclidinium-vilanterol 62.5-25 MCG/INH oral inhaler    ANORO ELLIPTA    1 Inhaler    Inhale 1 puff into the lungs daily    Chronic obstructive pulmonary disease, unspecified COPD type (H)

## 2018-04-26 NOTE — NURSING NOTE
Chief Complaint   Patient presents with     Lab Only     labs drawn by venipuncture by rn.      Labs drawn by venipuncture by rn.    Darlin Fitzgerald RN

## 2018-04-30 ENCOUNTER — SURGERY (OUTPATIENT)
Age: 68
End: 2018-04-30
Payer: COMMERCIAL

## 2018-04-30 ENCOUNTER — HOSPITAL ENCOUNTER (INPATIENT)
Facility: CLINIC | Age: 68
LOS: 5 days | Discharge: HOME | DRG: 742 | End: 2018-05-05
Attending: OBSTETRICS & GYNECOLOGY | Admitting: OBSTETRICS & GYNECOLOGY
Payer: MEDICARE

## 2018-04-30 ENCOUNTER — ANESTHESIA (OUTPATIENT)
Dept: SURGERY | Facility: CLINIC | Age: 68
DRG: 742 | End: 2018-04-30
Payer: MEDICARE

## 2018-04-30 ENCOUNTER — APPOINTMENT (OUTPATIENT)
Dept: GENERAL RADIOLOGY | Facility: CLINIC | Age: 68
DRG: 742 | End: 2018-04-30
Attending: STUDENT IN AN ORGANIZED HEALTH CARE EDUCATION/TRAINING PROGRAM
Payer: MEDICARE

## 2018-04-30 DIAGNOSIS — Z90.722 S/P BILATERAL SALPINGO-OOPHORECTOMY: ICD-10-CM

## 2018-04-30 DIAGNOSIS — Z01.818 PREOP TESTING: Primary | ICD-10-CM

## 2018-04-30 LAB
ABO + RH BLD: NORMAL
ABO + RH BLD: NORMAL
ANION GAP SERPL CALCULATED.3IONS-SCNC: 7 MMOL/L (ref 3–14)
APTT PPP: 25 SEC (ref 22–37)
BASE EXCESS BLDV CALC-SCNC: 0.7 MMOL/L
BLD GP AB SCN SERPL QL: NORMAL
BLOOD BANK CMNT PATIENT-IMP: NORMAL
BLOOD BANK CMNT PATIENT-IMP: NORMAL
BUN SERPL-MCNC: 17 MG/DL (ref 7–30)
CA-I BLD-MCNC: 4.6 MG/DL (ref 4.4–5.2)
CALCIUM SERPL-MCNC: 8.2 MG/DL (ref 8.5–10.1)
CHLORIDE SERPL-SCNC: 107 MMOL/L (ref 94–109)
CO2 SERPL-SCNC: 27 MMOL/L (ref 20–32)
CREAT SERPL-MCNC: 0.82 MG/DL (ref 0.52–1.04)
ERYTHROCYTE [DISTWIDTH] IN BLOOD BY AUTOMATED COUNT: 13.8 % (ref 10–15)
GFR SERPL CREATININE-BSD FRML MDRD: 70 ML/MIN/1.7M2
GLUCOSE BLDC GLUCOMTR-MCNC: 102 MG/DL (ref 70–99)
GLUCOSE SERPL-MCNC: 116 MG/DL (ref 70–99)
HCO3 BLDV-SCNC: 28 MMOL/L (ref 21–28)
HCT VFR BLD AUTO: 37.9 % (ref 35–47)
HGB BLD-MCNC: 12.4 G/DL (ref 11.7–15.7)
INR PPP: 0.98 (ref 0.86–1.14)
LACTATE BLD-SCNC: 0.8 MMOL/L (ref 0.7–2)
MAGNESIUM SERPL-MCNC: 1.8 MG/DL (ref 1.6–2.3)
MCH RBC QN AUTO: 31.6 PG (ref 26.5–33)
MCHC RBC AUTO-ENTMCNC: 32.7 G/DL (ref 31.5–36.5)
MCV RBC AUTO: 96 FL (ref 78–100)
O2/TOTAL GAS SETTING VFR VENT: 21 %
PCO2 BLDV: 55 MM HG (ref 40–50)
PH BLDV: 7.31 PH (ref 7.32–7.43)
PHOSPHATE SERPL-MCNC: 3.6 MG/DL (ref 2.5–4.5)
PLATELET # BLD AUTO: 267 10E9/L (ref 150–450)
PO2 BLDV: 72 MM HG (ref 25–47)
POTASSIUM SERPL-SCNC: 3.8 MMOL/L (ref 3.4–5.3)
RBC # BLD AUTO: 3.93 10E12/L (ref 3.8–5.2)
SODIUM SERPL-SCNC: 140 MMOL/L (ref 133–144)
SPECIMEN EXP DATE BLD: NORMAL
TROPONIN I SERPL-MCNC: <0.015 UG/L (ref 0–0.04)
WBC # BLD AUTO: 11.9 10E9/L (ref 4–11)

## 2018-04-30 PROCEDURE — 37000009 ZZH ANESTHESIA TECHNICAL FEE, EACH ADDTL 15 MIN: Performed by: OBSTETRICS & GYNECOLOGY

## 2018-04-30 PROCEDURE — 40000986 XR CHEST PORT 1 VW

## 2018-04-30 PROCEDURE — 25000132 ZZH RX MED GY IP 250 OP 250 PS 637: Mod: GY | Performed by: ANESTHESIOLOGY

## 2018-04-30 PROCEDURE — 25000125 ZZHC RX 250: Performed by: OBSTETRICS & GYNECOLOGY

## 2018-04-30 PROCEDURE — 88305 TISSUE EXAM BY PATHOLOGIST: CPT | Performed by: OBSTETRICS & GYNECOLOGY

## 2018-04-30 PROCEDURE — 0DNW0ZZ RELEASE PERITONEUM, OPEN APPROACH: ICD-10-PCS | Performed by: OBSTETRICS & GYNECOLOGY

## 2018-04-30 PROCEDURE — 40000275 ZZH STATISTIC RCP TIME EA 10 MIN

## 2018-04-30 PROCEDURE — 25000128 H RX IP 250 OP 636: Performed by: NURSE ANESTHETIST, CERTIFIED REGISTERED

## 2018-04-30 PROCEDURE — 25000125 ZZHC RX 250: Performed by: NURSE ANESTHETIST, CERTIFIED REGISTERED

## 2018-04-30 PROCEDURE — 25000128 H RX IP 250 OP 636

## 2018-04-30 PROCEDURE — 88331 PATH CONSLTJ SURG 1 BLK 1SPC: CPT | Performed by: OBSTETRICS & GYNECOLOGY

## 2018-04-30 PROCEDURE — 40000561 ZZH STATISTIC CODE BLUE NO ACCESS REQUIRED

## 2018-04-30 PROCEDURE — 71000013 ZZH RECOVERY PHASE 1 LEVEL 1 EA ADDTL HR: Performed by: OBSTETRICS & GYNECOLOGY

## 2018-04-30 PROCEDURE — 94640 AIRWAY INHALATION TREATMENT: CPT

## 2018-04-30 PROCEDURE — 85730 THROMBOPLASTIN TIME PARTIAL: CPT | Performed by: OBSTETRICS & GYNECOLOGY

## 2018-04-30 PROCEDURE — 36000064 ZZH SURGERY LEVEL 4 EA 15 ADDTL MIN - UMMC: Performed by: OBSTETRICS & GYNECOLOGY

## 2018-04-30 PROCEDURE — 00000155 ZZHCL STATISTIC H-CELL BLOCK W/STAIN: Performed by: OBSTETRICS & GYNECOLOGY

## 2018-04-30 PROCEDURE — 27210794 ZZH OR GENERAL SUPPLY STERILE: Performed by: OBSTETRICS & GYNECOLOGY

## 2018-04-30 PROCEDURE — 82803 BLOOD GASES ANY COMBINATION: CPT | Performed by: OBSTETRICS & GYNECOLOGY

## 2018-04-30 PROCEDURE — 25000128 H RX IP 250 OP 636: Performed by: ANESTHESIOLOGY

## 2018-04-30 PROCEDURE — 58720 REMOVAL OF OVARY/TUBE(S): CPT | Mod: GC | Performed by: OBSTETRICS & GYNECOLOGY

## 2018-04-30 PROCEDURE — 93005 ELECTROCARDIOGRAM TRACING: CPT

## 2018-04-30 PROCEDURE — 85027 COMPLETE CBC AUTOMATED: CPT | Performed by: OBSTETRICS & GYNECOLOGY

## 2018-04-30 PROCEDURE — 00000146 ZZHCL STATISTIC GLUCOSE BY METER IP

## 2018-04-30 PROCEDURE — 88307 TISSUE EXAM BY PATHOLOGIST: CPT | Performed by: OBSTETRICS & GYNECOLOGY

## 2018-04-30 PROCEDURE — 12000006 ZZH R&B IMCU INTERMEDIATE UMMC

## 2018-04-30 PROCEDURE — 25000565 ZZH ISOFLURANE, EA 15 MIN: Performed by: OBSTETRICS & GYNECOLOGY

## 2018-04-30 PROCEDURE — 82330 ASSAY OF CALCIUM: CPT | Performed by: OBSTETRICS & GYNECOLOGY

## 2018-04-30 PROCEDURE — 40000065 ZZH STATISTIC EKG NON-CHARGEABLE

## 2018-04-30 PROCEDURE — 36000062 ZZH SURGERY LEVEL 4 1ST 30 MIN - UMMC: Performed by: OBSTETRICS & GYNECOLOGY

## 2018-04-30 PROCEDURE — 0DBU0ZZ EXCISION OF OMENTUM, OPEN APPROACH: ICD-10-PCS | Performed by: OBSTETRICS & GYNECOLOGY

## 2018-04-30 PROCEDURE — 37000008 ZZH ANESTHESIA TECHNICAL FEE, 1ST 30 MIN: Performed by: OBSTETRICS & GYNECOLOGY

## 2018-04-30 PROCEDURE — 25000128 H RX IP 250 OP 636: Performed by: STUDENT IN AN ORGANIZED HEALTH CARE EDUCATION/TRAINING PROGRAM

## 2018-04-30 PROCEDURE — C9290 INJ, BUPIVACAINE LIPOSOME: HCPCS | Performed by: ANESTHESIOLOGY

## 2018-04-30 PROCEDURE — 27210995 ZZH RX 272: Performed by: OBSTETRICS & GYNECOLOGY

## 2018-04-30 PROCEDURE — 93010 ELECTROCARDIOGRAM REPORT: CPT | Performed by: INTERNAL MEDICINE

## 2018-04-30 PROCEDURE — A9270 NON-COVERED ITEM OR SERVICE: HCPCS | Mod: GY | Performed by: ANESTHESIOLOGY

## 2018-04-30 PROCEDURE — 85610 PROTHROMBIN TIME: CPT | Performed by: OBSTETRICS & GYNECOLOGY

## 2018-04-30 PROCEDURE — 71000012 ZZH RECOVERY PHASE 1 LEVEL 1 FIRST HR: Performed by: OBSTETRICS & GYNECOLOGY

## 2018-04-30 PROCEDURE — 88112 CYTOPATH CELL ENHANCE TECH: CPT | Performed by: OBSTETRICS & GYNECOLOGY

## 2018-04-30 PROCEDURE — 83605 ASSAY OF LACTIC ACID: CPT | Performed by: OBSTETRICS & GYNECOLOGY

## 2018-04-30 PROCEDURE — 0UT20ZZ RESECTION OF BILATERAL OVARIES, OPEN APPROACH: ICD-10-PCS | Performed by: OBSTETRICS & GYNECOLOGY

## 2018-04-30 PROCEDURE — 83735 ASSAY OF MAGNESIUM: CPT | Performed by: OBSTETRICS & GYNECOLOGY

## 2018-04-30 PROCEDURE — 40000014 ZZH STATISTIC ARTERIAL MONITORING DAILY

## 2018-04-30 PROCEDURE — 80048 BASIC METABOLIC PNL TOTAL CA: CPT | Performed by: OBSTETRICS & GYNECOLOGY

## 2018-04-30 PROCEDURE — 84100 ASSAY OF PHOSPHORUS: CPT | Performed by: OBSTETRICS & GYNECOLOGY

## 2018-04-30 PROCEDURE — 0UT70ZZ RESECTION OF BILATERAL FALLOPIAN TUBES, OPEN APPROACH: ICD-10-PCS | Performed by: OBSTETRICS & GYNECOLOGY

## 2018-04-30 PROCEDURE — 84484 ASSAY OF TROPONIN QUANT: CPT | Performed by: OBSTETRICS & GYNECOLOGY

## 2018-04-30 PROCEDURE — 40000170 ZZH STATISTIC PRE-PROCEDURE ASSESSMENT II: Performed by: OBSTETRICS & GYNECOLOGY

## 2018-04-30 PROCEDURE — 0UN90ZZ RELEASE UTERUS, OPEN APPROACH: ICD-10-PCS | Performed by: OBSTETRICS & GYNECOLOGY

## 2018-04-30 RX ORDER — METOPROLOL TARTRATE 1 MG/ML
5 INJECTION, SOLUTION INTRAVENOUS EVERY 6 HOURS
Status: DISCONTINUED | OUTPATIENT
Start: 2018-05-01 | End: 2018-04-30

## 2018-04-30 RX ORDER — LIDOCAINE 40 MG/G
CREAM TOPICAL
Status: DISCONTINUED | OUTPATIENT
Start: 2018-04-30 | End: 2018-04-30 | Stop reason: HOSPADM

## 2018-04-30 RX ORDER — SODIUM CHLORIDE, SODIUM LACTATE, POTASSIUM CHLORIDE, CALCIUM CHLORIDE 600; 310; 30; 20 MG/100ML; MG/100ML; MG/100ML; MG/100ML
INJECTION, SOLUTION INTRAVENOUS CONTINUOUS
Status: DISCONTINUED | OUTPATIENT
Start: 2018-04-30 | End: 2018-04-30 | Stop reason: HOSPADM

## 2018-04-30 RX ORDER — OXYCODONE HYDROCHLORIDE 5 MG/1
5-10 TABLET ORAL EVERY 4 HOURS PRN
Status: DISCONTINUED | OUTPATIENT
Start: 2018-04-30 | End: 2018-05-02

## 2018-04-30 RX ORDER — NALOXONE HYDROCHLORIDE 0.4 MG/ML
.1-.4 INJECTION, SOLUTION INTRAMUSCULAR; INTRAVENOUS; SUBCUTANEOUS
Status: DISCONTINUED | OUTPATIENT
Start: 2018-04-30 | End: 2018-04-30 | Stop reason: HOSPADM

## 2018-04-30 RX ORDER — PROPOFOL 10 MG/ML
INJECTION, EMULSION INTRAVENOUS PRN
Status: DISCONTINUED | OUTPATIENT
Start: 2018-04-30 | End: 2018-04-30

## 2018-04-30 RX ORDER — ONDANSETRON 4 MG/1
4 TABLET, ORALLY DISINTEGRATING ORAL EVERY 30 MIN PRN
Status: DISCONTINUED | OUTPATIENT
Start: 2018-04-30 | End: 2018-04-30 | Stop reason: HOSPADM

## 2018-04-30 RX ORDER — METOPROLOL TARTRATE 25 MG/1
50 TABLET, FILM COATED ORAL ONCE
Status: COMPLETED | OUTPATIENT
Start: 2018-04-30 | End: 2018-04-30

## 2018-04-30 RX ORDER — ALBUTEROL SULFATE 90 UG/1
2 AEROSOL, METERED RESPIRATORY (INHALATION) EVERY 6 HOURS PRN
Status: DISCONTINUED | OUTPATIENT
Start: 2018-04-30 | End: 2018-05-05 | Stop reason: HOSPADM

## 2018-04-30 RX ORDER — ONDANSETRON 4 MG/1
4 TABLET, ORALLY DISINTEGRATING ORAL EVERY 8 HOURS PRN
Status: DISCONTINUED | OUTPATIENT
Start: 2018-05-01 | End: 2018-05-05 | Stop reason: HOSPADM

## 2018-04-30 RX ORDER — ONDANSETRON 2 MG/ML
4 INJECTION INTRAMUSCULAR; INTRAVENOUS EVERY 30 MIN PRN
Status: DISCONTINUED | OUTPATIENT
Start: 2018-04-30 | End: 2018-04-30 | Stop reason: HOSPADM

## 2018-04-30 RX ORDER — LABETALOL HYDROCHLORIDE 5 MG/ML
10 INJECTION, SOLUTION INTRAVENOUS
Status: DISCONTINUED | OUTPATIENT
Start: 2018-04-30 | End: 2018-04-30 | Stop reason: HOSPADM

## 2018-04-30 RX ORDER — SIMETHICONE 80 MG
80 TABLET,CHEWABLE ORAL 4 TIMES DAILY PRN
Status: DISCONTINUED | OUTPATIENT
Start: 2018-04-30 | End: 2018-05-05 | Stop reason: HOSPADM

## 2018-04-30 RX ORDER — LEVOTHYROXINE SODIUM 50 UG/1
50 TABLET ORAL DAILY
Status: DISCONTINUED | OUTPATIENT
Start: 2018-04-30 | End: 2018-05-05 | Stop reason: HOSPADM

## 2018-04-30 RX ORDER — SODIUM CHLORIDE, SODIUM LACTATE, POTASSIUM CHLORIDE, CALCIUM CHLORIDE 600; 310; 30; 20 MG/100ML; MG/100ML; MG/100ML; MG/100ML
INJECTION, SOLUTION INTRAVENOUS CONTINUOUS
Status: DISCONTINUED | OUTPATIENT
Start: 2018-04-30 | End: 2018-04-30

## 2018-04-30 RX ORDER — HYDROMORPHONE HYDROCHLORIDE 1 MG/ML
.3-.5 INJECTION, SOLUTION INTRAMUSCULAR; INTRAVENOUS; SUBCUTANEOUS EVERY 5 MIN PRN
Status: DISCONTINUED | OUTPATIENT
Start: 2018-04-30 | End: 2018-04-30 | Stop reason: HOSPADM

## 2018-04-30 RX ORDER — BUPROPION HYDROCHLORIDE 200 MG/1
200 TABLET, EXTENDED RELEASE ORAL 2 TIMES DAILY
Status: DISCONTINUED | OUTPATIENT
Start: 2018-04-30 | End: 2018-05-05 | Stop reason: HOSPADM

## 2018-04-30 RX ORDER — LIDOCAINE 40 MG/G
CREAM TOPICAL
Status: DISCONTINUED | OUTPATIENT
Start: 2018-04-30 | End: 2018-05-05 | Stop reason: HOSPADM

## 2018-04-30 RX ORDER — ASPIRIN 81 MG
100 TABLET, DELAYED RELEASE (ENTERIC COATED) ORAL DAILY
Qty: 60 TABLET | Refills: 0 | Status: SHIPPED | OUTPATIENT
Start: 2018-04-30 | End: 2019-10-21

## 2018-04-30 RX ORDER — NALOXONE HYDROCHLORIDE 0.4 MG/ML
.1-.4 INJECTION, SOLUTION INTRAMUSCULAR; INTRAVENOUS; SUBCUTANEOUS
Status: DISCONTINUED | OUTPATIENT
Start: 2018-04-30 | End: 2018-04-30

## 2018-04-30 RX ORDER — DIPHENHYDRAMINE HYDROCHLORIDE 50 MG/ML
50 INJECTION INTRAMUSCULAR; INTRAVENOUS ONCE
Status: DISCONTINUED | OUTPATIENT
Start: 2018-04-30 | End: 2018-04-30

## 2018-04-30 RX ORDER — FENTANYL CITRATE 50 UG/ML
25-50 INJECTION, SOLUTION INTRAMUSCULAR; INTRAVENOUS
Status: DISCONTINUED | OUTPATIENT
Start: 2018-04-30 | End: 2018-04-30 | Stop reason: HOSPADM

## 2018-04-30 RX ORDER — HYDRALAZINE HYDROCHLORIDE 20 MG/ML
INJECTION INTRAMUSCULAR; INTRAVENOUS PRN
Status: DISCONTINUED | OUTPATIENT
Start: 2018-04-30 | End: 2018-04-30

## 2018-04-30 RX ORDER — ONDANSETRON 2 MG/ML
INJECTION INTRAMUSCULAR; INTRAVENOUS PRN
Status: DISCONTINUED | OUTPATIENT
Start: 2018-04-30 | End: 2018-04-30

## 2018-04-30 RX ORDER — IPRATROPIUM BROMIDE AND ALBUTEROL SULFATE 2.5; .5 MG/3ML; MG/3ML
3 SOLUTION RESPIRATORY (INHALATION)
Status: DISCONTINUED | OUTPATIENT
Start: 2018-04-30 | End: 2018-05-04

## 2018-04-30 RX ORDER — ESCITALOPRAM OXALATE 10 MG/1
10 TABLET ORAL DAILY
Status: DISCONTINUED | OUTPATIENT
Start: 2018-04-30 | End: 2018-05-05 | Stop reason: HOSPADM

## 2018-04-30 RX ORDER — DIPHENHYDRAMINE HYDROCHLORIDE 50 MG/ML
12.5 INJECTION INTRAMUSCULAR; INTRAVENOUS EVERY 6 HOURS PRN
Status: DISCONTINUED | OUTPATIENT
Start: 2018-04-30 | End: 2018-04-30

## 2018-04-30 RX ORDER — NALOXONE HYDROCHLORIDE 0.4 MG/ML
.1-.4 INJECTION, SOLUTION INTRAMUSCULAR; INTRAVENOUS; SUBCUTANEOUS
Status: DISCONTINUED | OUTPATIENT
Start: 2018-05-01 | End: 2018-05-05 | Stop reason: HOSPADM

## 2018-04-30 RX ORDER — ACETAMINOPHEN 325 MG/1
650 TABLET ORAL EVERY 6 HOURS
Status: DISCONTINUED | OUTPATIENT
Start: 2018-04-30 | End: 2018-05-05 | Stop reason: HOSPADM

## 2018-04-30 RX ORDER — METOPROLOL SUCCINATE 25 MG/1
25 TABLET, EXTENDED RELEASE ORAL DAILY
Status: DISCONTINUED | OUTPATIENT
Start: 2018-05-01 | End: 2018-05-05 | Stop reason: HOSPADM

## 2018-04-30 RX ORDER — HYDRALAZINE HYDROCHLORIDE 20 MG/ML
2.5-5 INJECTION INTRAMUSCULAR; INTRAVENOUS EVERY 10 MIN PRN
Status: DISCONTINUED | OUTPATIENT
Start: 2018-04-30 | End: 2018-04-30 | Stop reason: HOSPADM

## 2018-04-30 RX ORDER — SODIUM CHLORIDE 9 MG/ML
INJECTION, SOLUTION INTRAVENOUS CONTINUOUS
Status: DISCONTINUED | OUTPATIENT
Start: 2018-04-30 | End: 2018-05-02

## 2018-04-30 RX ORDER — LORAZEPAM 0.5 MG/1
0.5 TABLET ORAL EVERY 8 HOURS PRN
Status: DISCONTINUED | OUTPATIENT
Start: 2018-04-30 | End: 2018-05-05 | Stop reason: HOSPADM

## 2018-04-30 RX ORDER — AMOXICILLIN 250 MG
2 CAPSULE ORAL 2 TIMES DAILY PRN
Status: DISCONTINUED | OUTPATIENT
Start: 2018-04-30 | End: 2018-05-05 | Stop reason: HOSPADM

## 2018-04-30 RX ORDER — AMOXICILLIN 250 MG
1 CAPSULE ORAL 2 TIMES DAILY PRN
Status: DISCONTINUED | OUTPATIENT
Start: 2018-04-30 | End: 2018-05-05 | Stop reason: HOSPADM

## 2018-04-30 RX ORDER — CEFAZOLIN SODIUM 1 G/3ML
1 INJECTION, POWDER, FOR SOLUTION INTRAMUSCULAR; INTRAVENOUS SEE ADMIN INSTRUCTIONS
Status: DISCONTINUED | OUTPATIENT
Start: 2018-04-30 | End: 2018-04-30 | Stop reason: HOSPADM

## 2018-04-30 RX ORDER — BISACODYL 10 MG
10 SUPPOSITORY, RECTAL RECTAL DAILY
Status: DISCONTINUED | OUTPATIENT
Start: 2018-04-30 | End: 2018-05-05 | Stop reason: HOSPADM

## 2018-04-30 RX ORDER — CEFAZOLIN SODIUM 2 G/100ML
2 INJECTION, SOLUTION INTRAVENOUS
Status: COMPLETED | OUTPATIENT
Start: 2018-04-30 | End: 2018-04-30

## 2018-04-30 RX ORDER — FENTANYL CITRATE 50 UG/ML
INJECTION, SOLUTION INTRAMUSCULAR; INTRAVENOUS PRN
Status: DISCONTINUED | OUTPATIENT
Start: 2018-04-30 | End: 2018-04-30

## 2018-04-30 RX ORDER — ONDANSETRON 4 MG/1
4 TABLET, ORALLY DISINTEGRATING ORAL EVERY 8 HOURS
Status: DISPENSED | OUTPATIENT
Start: 2018-04-30 | End: 2018-05-01

## 2018-04-30 RX ORDER — PANTOPRAZOLE SODIUM 40 MG/1
40 TABLET, DELAYED RELEASE ORAL
Status: DISCONTINUED | OUTPATIENT
Start: 2018-04-30 | End: 2018-05-05 | Stop reason: HOSPADM

## 2018-04-30 RX ORDER — DEXAMETHASONE SODIUM PHOSPHATE 4 MG/ML
INJECTION, SOLUTION INTRA-ARTICULAR; INTRALESIONAL; INTRAMUSCULAR; INTRAVENOUS; SOFT TISSUE PRN
Status: DISCONTINUED | OUTPATIENT
Start: 2018-04-30 | End: 2018-04-30

## 2018-04-30 RX ORDER — OXYCODONE AND ACETAMINOPHEN 5; 325 MG/1; MG/1
1 TABLET ORAL EVERY 6 HOURS PRN
Qty: 20 TABLET | Refills: 0 | Status: SHIPPED | OUTPATIENT
Start: 2018-04-30 | End: 2018-08-01

## 2018-04-30 RX ORDER — HYDROMORPHONE HYDROCHLORIDE 1 MG/ML
0.2 INJECTION, SOLUTION INTRAMUSCULAR; INTRAVENOUS; SUBCUTANEOUS
Status: DISCONTINUED | OUTPATIENT
Start: 2018-04-30 | End: 2018-05-01

## 2018-04-30 RX ORDER — FLUMAZENIL 0.1 MG/ML
0.2 INJECTION, SOLUTION INTRAVENOUS
Status: DISCONTINUED | OUTPATIENT
Start: 2018-04-30 | End: 2018-04-30 | Stop reason: HOSPADM

## 2018-04-30 RX ORDER — NALOXONE HYDROCHLORIDE 0.4 MG/ML
.1-.4 INJECTION, SOLUTION INTRAMUSCULAR; INTRAVENOUS; SUBCUTANEOUS
Status: ACTIVE | OUTPATIENT
Start: 2018-04-30 | End: 2018-05-01

## 2018-04-30 RX ORDER — BUPIVACAINE HYDROCHLORIDE 2.5 MG/ML
INJECTION, SOLUTION EPIDURAL; INFILTRATION; INTRACAUDAL PRN
Status: DISCONTINUED | OUTPATIENT
Start: 2018-04-30 | End: 2018-04-30

## 2018-04-30 RX ORDER — DIPHENHYDRAMINE HCL 12.5MG/5ML
12.5 LIQUID (ML) ORAL EVERY 6 HOURS PRN
Status: DISCONTINUED | OUTPATIENT
Start: 2018-04-30 | End: 2018-04-30

## 2018-04-30 RX ORDER — KETOROLAC TROMETHAMINE 30 MG/ML
15 INJECTION, SOLUTION INTRAMUSCULAR; INTRAVENOUS
Status: DISCONTINUED | OUTPATIENT
Start: 2018-04-30 | End: 2018-04-30 | Stop reason: HOSPADM

## 2018-04-30 RX ADMIN — PROPOFOL 120 MG: 10 INJECTION, EMULSION INTRAVENOUS at 12:39

## 2018-04-30 RX ADMIN — HYDROMORPHONE HYDROCHLORIDE 0.5 MG: 1 INJECTION, SOLUTION INTRAMUSCULAR; INTRAVENOUS; SUBCUTANEOUS at 16:59

## 2018-04-30 RX ADMIN — ROCURONIUM BROMIDE 20 MG: 10 INJECTION INTRAVENOUS at 14:12

## 2018-04-30 RX ADMIN — FENTANYL CITRATE 50 MCG: 50 INJECTION INTRAMUSCULAR; INTRAVENOUS at 12:09

## 2018-04-30 RX ADMIN — FENTANYL CITRATE 50 MCG: 50 INJECTION, SOLUTION INTRAMUSCULAR; INTRAVENOUS at 14:11

## 2018-04-30 RX ADMIN — BUPIVACAINE 20 ML: 13.3 INJECTION, SUSPENSION, LIPOSOMAL INFILTRATION at 12:10

## 2018-04-30 RX ADMIN — ONDANSETRON 4 MG: 2 INJECTION INTRAMUSCULAR; INTRAVENOUS at 17:41

## 2018-04-30 RX ADMIN — FENTANYL CITRATE 100 MCG: 50 INJECTION, SOLUTION INTRAMUSCULAR; INTRAVENOUS at 13:30

## 2018-04-30 RX ADMIN — BUPIVACAINE HYDROCHLORIDE 20 ML: 2.5 INJECTION, SOLUTION EPIDURAL; INFILTRATION; INTRACAUDAL; PERINEURAL at 12:10

## 2018-04-30 RX ADMIN — SODIUM CHLORIDE: 9 INJECTION, SOLUTION INTRAVENOUS at 16:38

## 2018-04-30 RX ADMIN — FENTANYL CITRATE 50 MCG: 50 INJECTION, SOLUTION INTRAMUSCULAR; INTRAVENOUS at 12:35

## 2018-04-30 RX ADMIN — FENTANYL CITRATE 50 MCG: 50 INJECTION INTRAMUSCULAR; INTRAVENOUS at 16:12

## 2018-04-30 RX ADMIN — HYDROMORPHONE HYDROCHLORIDE 0.2 MG: 1 INJECTION, SOLUTION INTRAMUSCULAR; INTRAVENOUS; SUBCUTANEOUS at 16:52

## 2018-04-30 RX ADMIN — ONDANSETRON 4 MG: 2 INJECTION INTRAMUSCULAR; INTRAVENOUS at 13:08

## 2018-04-30 RX ADMIN — ROCURONIUM BROMIDE 10 MG: 10 INJECTION INTRAVENOUS at 13:39

## 2018-04-30 RX ADMIN — ROCURONIUM BROMIDE 10 MG: 10 INJECTION INTRAVENOUS at 13:19

## 2018-04-30 RX ADMIN — SODIUM CHLORIDE, POTASSIUM CHLORIDE, SODIUM LACTATE AND CALCIUM CHLORIDE: 600; 310; 30; 20 INJECTION, SOLUTION INTRAVENOUS at 12:22

## 2018-04-30 RX ADMIN — MIDAZOLAM HYDROCHLORIDE 1 MG: 1 INJECTION, SOLUTION INTRAMUSCULAR; INTRAVENOUS at 12:09

## 2018-04-30 RX ADMIN — METOPROLOL TARTRATE 50 MG: 25 TABLET ORAL at 11:50

## 2018-04-30 RX ADMIN — CEFAZOLIN 1 G: 1 INJECTION, POWDER, FOR SOLUTION INTRAMUSCULAR; INTRAVENOUS at 15:00

## 2018-04-30 RX ADMIN — FENTANYL CITRATE 50 MCG: 50 INJECTION INTRAMUSCULAR; INTRAVENOUS at 16:19

## 2018-04-30 RX ADMIN — FENTANYL CITRATE 50 MCG: 50 INJECTION, SOLUTION INTRAMUSCULAR; INTRAVENOUS at 13:27

## 2018-04-30 RX ADMIN — HYDROMORPHONE HYDROCHLORIDE 0.3 MG: 1 INJECTION, SOLUTION INTRAMUSCULAR; INTRAVENOUS; SUBCUTANEOUS at 16:40

## 2018-04-30 RX ADMIN — FENTANYL CITRATE 50 MCG: 50 INJECTION, SOLUTION INTRAMUSCULAR; INTRAVENOUS at 15:55

## 2018-04-30 RX ADMIN — CEFAZOLIN SODIUM 2 G: 2 INJECTION, SOLUTION INTRAVENOUS at 13:00

## 2018-04-30 RX ADMIN — DEXAMETHASONE SODIUM PHOSPHATE 4 MG: 4 INJECTION, SOLUTION INTRA-ARTICULAR; INTRALESIONAL; INTRAMUSCULAR; INTRAVENOUS; SOFT TISSUE at 13:08

## 2018-04-30 RX ADMIN — SODIUM CHLORIDE, POTASSIUM CHLORIDE, SODIUM LACTATE AND CALCIUM CHLORIDE: 600; 310; 30; 20 INJECTION, SOLUTION INTRAVENOUS at 15:00

## 2018-04-30 RX ADMIN — FENTANYL CITRATE 50 MCG: 50 INJECTION, SOLUTION INTRAMUSCULAR; INTRAVENOUS at 13:09

## 2018-04-30 RX ADMIN — IPRATROPIUM BROMIDE AND ALBUTEROL SULFATE 3 ML: .5; 3 SOLUTION RESPIRATORY (INHALATION) at 22:09

## 2018-04-30 RX ADMIN — SUGAMMADEX 150 MG: 100 INJECTION, SOLUTION INTRAVENOUS at 15:15

## 2018-04-30 RX ADMIN — ROCURONIUM BROMIDE 50 MG: 10 INJECTION INTRAVENOUS at 12:39

## 2018-04-30 RX ADMIN — THROMBIN HUMAN 1 KIT: 2000 POWDER, FOR SOLUTION TOPICAL at 14:45

## 2018-04-30 RX ADMIN — HYDRALAZINE HYDROCHLORIDE 4 MG: 20 INJECTION INTRAMUSCULAR; INTRAVENOUS at 15:15

## 2018-04-30 ASSESSMENT — ACTIVITIES OF DAILY LIVING (ADL): ADLS_ACUITY_SCORE: 16

## 2018-04-30 NOTE — ANESTHESIA PROCEDURE NOTES
Arterial Line Procedure Note  Staff:     Anesthesiologist:  DARRICK OSMAN  Location: In OR After Induction  Procedure Start/Stop Times:     patient identified, IV checked, site marked, risks and benefits discussed, informed consent, monitors and equipment checked, pre-op evaluation and at physician/surgeon's request      Correct Patient: Yes      Correct Position: Yes      Correct Site: Yes      Correct Procedure: Yes      Correct Laterality:  Yes    Site Marked:  Yes  Line Placement:     Procedure:  Arterial Line    Insertion Site:  Radial    Insertion laterality:  Left    Skin Prep: Chloraprep      Patient Prep: patient draped, mask, sterile gloves, hat and hand hygiene      Local skin infiltration:  None    Catheter size:  20 gauge, Quick cath    Cath secured with: suture      Dressing:  Tegaderm    Complications:  None obvious    Arterial waveform: Yes      IBP within 10% of NIBP: Yes

## 2018-04-30 NOTE — IP AVS SNAPSHOT
MRN:[id number]                      After Visit Summary   4/30/2018    Michela Cole    MRN: [id number]           Thank you!     Thank you for choosing [facility] for your care. Our goal is always to provide you with excellent care. Hearing back from our patients is one way we can continue to improve our services. Please take a few minutes to complete the written survey that you may receive in the mail after you visit with us. Thank you!        Patient Information     Date Of Birth          1950        Designated Caregiver       Most Recent Value    Caregiver    Will someone help with your care after discharge? no      About your hospital stay     You were admitted on:  April 30, 2018 You last received care in the:  [address] [facility]    You were discharged on:  May 5, 2018        Reason for your hospital stay       Removal of your ovaries and fallopian tubes for a pelvic mass                  Who to Call     For medical emergencies, please call 911.  For non-urgent questions about your medical care, please call your primary care provider or clinic, [phone]  For questions related to your surgery, please call your surgery clinic        Attending Provider     Provider Specialty    Melody Bolivar MD OB/Gyn    Julieta Moreland MD Oncology       Primary Care Provider Office Phone # Fax #    Frannie Sharon Allen [phone]      After Care Instructions     Activity       Your activity upon discharge: no lifting, driving, or strenuous exercise for 6 weeks            Diet       Follow this diet upon discharge: Regular                  Follow-up Appointments     Adult [facility] Follow-up and recommended labs and tests       As scheduled with Dr. Bolivar.   Appointments on [facility] and/or [facility] (with [facility] or [facility] provider or service). Call [phone] if you haven't heard regarding these appointments within 7 days of discharge.                  Your next 10  appointments already scheduled     May 15, 2018  1:00 PM CDT   (Arrive by 12:45 PM)   Return Visit with Melody Bolivar MD   [facility] ([facility])    [address]  [address]  [place] [address] [phone]              Further instructions from your care team       GENERAL POST-OPERATIVE  PATIENT INSTRUCTIONS      FOLLOW-UP:    Call Surgeon if you have:    Temperature greater than 100.4    Persistent nausea and vomiting    Severe uncontrolled pain    Redness, tenderness, or signs of infection (pain, swelling, redness, odor or green/yellow discharge around the site)    Difficulty breathing, headache or visual disturbances    Hives    Persistent dizziness or light-headedness    Extreme fatigue    Any other questions or concerns you may have after discharge    In an emergency, call 911 or go to an Emergency Department at a nearby hospital       WOUND CARE INSTRUCTIONS:  Keep a dry clean dressing on the wound if there is drainage. The initial bandage may be removed after 24 hours.  Once the wound has quit draining you may leave it open to air.  If clothing rubs against the wound or causes irritation and the wound is not draining you may cover it with a dry dressing during the daytime.  Try to keep the wound dry and avoid ointments on the wound unless directed to do so.  If the wound becomes bright red and painful or starts to drain infected material that is not clear, please contact your physician immediately.    1.  You may shower 48 hrs after surgery   2.  No soaking in the tub        DIET:  There are no dietary restrictions.  You may eat any foods that you can tolerate.  It is a good idea to eat a high fiber diet and take in plenty of fluids to prevent constipation.  If you do become constipated you may want to take a mild laxative or take ducolax tablets on a daily basis until your bowel habits are regular.  Constipation can be very uncomfortable, along  with straining, after recent surgery.    ACTIVITY:  You are encouraged to cough and deep breath or use your incentive spirometer if you were given one, every 15-30 minutes when awake.  This will help prevent respiratory complications and low grade fevers post-operatively if you had a general anesthetic.  You may want to hug a pillow when coughing and sneezing to add additional support to the surgical area, if you had abdominal or chest surgery, which will decrease pain during these times.       1.  No heavy lifting >20lbs or strenuous exercise for six-eight weeks.  No exercise in which you are using core muscles (yoga, pilates, swimming, weight lifting)   2.  You may walk as much as you wish.  You are encouraged to increase your   activity each day after surgery.  Stairs are okay.    3.  Nothing per vagina for eight weeks.  No tampons, no intercourse, no douching.  You can expect some light vaginal spotting and discharge for up to six weeks.  If bleeding becomes heavy, please contact the office.     MEDICATIONS:  Try to take narcotic medications and anti-inflammatory medications, such as tylenol, ibuprofen, naprosyn, etc., with food.  This will minimize stomach upset from the medication.  Should you develop nausea and vomiting from the pain medication, or develop a rash, please discontinue the medication and contact your physician.  You should not drive, make important decisions, or operate machinery when taking narcotic pain medication.    OTHER:  Patients are often constipated after general anesthesia and surgery.  The patient should continue to take stool softeners (for example, Colace or Senokot-S) for the next six weeks and consume adequate amounts of water.  If the patient remains constipated or unable to pass stool, please try one or all of the following measures:  1.  Milk of Magnesia 30cc twice a day as needed by mouth  2.  Metamucil 2 tablespoons in 12 ounces of fluid  3.  Dulcolax oral or  "suppositories  4.  Prunes or prune juice  5.  Miralax daily      QUESTIONS:  Please feel free to call your physician or the hospital  if you have any questions, and they will be glad to assist you.      Additional Information     If you use hormonal birth control (such as the pill, patch, ring or implants): You'll need a second form of birth control for 7 days (condoms, a diaphragm or contraceptive foam). While in the hospital, you received a medicine called Bridion. Your normal birth control will not work as well for a week after taking this medicine.          Pending Results     Date and Time Order Name Status Description    4/30/2018 1413 Surgical pathology exam Preliminary             Statement of Approval     Ordered          05/05/18 0956  I have reviewed and agree with all the recommendations and orders detailed in this document.  EFFECTIVE NOW     Approved and electronically signed by:  Sampson Chavez MD             Admission Information     Date & Time Provider Department Dept. Phone    4/30/2018 Julieta Moreland MD [address] [facility] [phone]      Your Vitals Were     Blood Pressure Pulse Temperature Respirations Height Weight    157/89 (BP Location: Right arm) 82 98.3  F (36.8  C) (Oral) 18 1.52 m (4' 11.84\") 61.6 kg (135 lb 12.8 oz)    Pulse Oximetry BMI (Body Mass Index)                98% 26.66 kg/m2          My[employer] it Information     [facility] lets you send messages to your doctor, view your test results, renew your prescriptions, schedule appointments and more. To sign up, go to [email or web address] . Click on \"Log in\" on the left side of the screen, which will take you to the Welcome page. Then click on \"Sign up Now\" on the right side of the page.     You will be asked to enter the access code listed below, as well as some personal information. Please follow the directions to create your username and password.     Your access code is: [email or web address]  Expires: 6/11/2018  2:42 PM   "   Your access code will  in 90 days. If you need help or a new code, please call your [facility] clinic or [phone].        Care EveryWhere ID     This is your Care EveryWhere ID. This could be used by other organizations to access your [facility] medical records  [id number]        Equal Access to Services     DARCYDOMENIC DALETEDDY : Hadii aad ku hadasho Soomaali, waaxda luqadaha, qaybta kaalmada adeegyada, charline stantongenesisabhinav rose. So [facility] [phone].    ATENCIÓN: Si habla español, tiene a de la rosa disposición servicios gratuitos de asistencia lingüística. Madhavi al [phone].    We comply with applicable federal civil rights laws and [place] laws. We do not discriminate on the basis of race, color, national origin, age, disability, sex, sexual orientation, or gender identity.               Review of your medicines      START taking        Dose / Directions    docusate sodium 100 MG tablet   Commonly known as:  COLACE   Used for:  S/P bilateral salpingo-oophorectomy        Dose:  100 mg   Take 100 mg by mouth daily   Quantity:  60 tablet   Refills:  0       oxyCODONE-acetaminophen 5-325 MG per tablet   Commonly known as:  PERCOCET   Used for:  S/P bilateral salpingo-oophorectomy        Dose:  1 tablet   Take 1 tablet by mouth every 6 hours as needed for severe pain maximum 6 tablet(s) per day   Quantity:  20 tablet   Refills:  0       simethicone 80 MG chewable tablet   Commonly known as:  MYLICON   Used for:  S/P bilateral salpingo-oophorectomy        Dose:  80 mg   Take 1 tablet (80 mg) by mouth 4 times daily as needed for cramping (gas)   Quantity:  20 tablet   Refills:  0         CONTINUE these medicines which may have CHANGED, or have new prescriptions. If we are uncertain of the size of tablets/capsules you have at home, strength may be listed as something that might have changed.        Dose / Directions    metoprolol succinate 50 MG 24 hr tablet   Commonly known as:  TOPROL-XL   This may  have changed:  when to take this   Used for:  Coronary artery disease involving native coronary artery of native heart without angina pectoris        Dose:  50 mg   Take 1 tablet (50 mg) by mouth daily   Quantity:  90 tablet   Refills:  3         CONTINUE these medicines which have NOT CHANGED        Dose / Directions    albuterol 108 (90 Base) MCG/ACT Inhaler   Commonly known as:  PROAIR HFA/PROVENTIL HFA/VENTOLIN HFA   Used for:  Chronic obstructive pulmonary disease, unspecified COPD type (H)        Dose:  2 puff   Inhale 2 puffs into the lungs every 6 hours as needed for shortness of breath / dyspnea   Quantity:  2 Inhaler   Refills:  2       aspirin EC 81 MG EC tablet        Dose:  81 mg   Take 81 mg by mouth every morning   Refills:  0       atorvastatin 40 MG tablet   Commonly known as:  LIPITOR   Used for:  Hyperlipidemia LDL goal <100        Dose:  40 mg   Take 1 tablet (40 mg) by mouth At Bedtime   Quantity:  90 tablet   Refills:  1       BRILINTA 60 MG tablet   Generic drug:  ticagrelor        Dose:  60 mg   Take 60 mg by mouth daily   Refills:  11       buPROPion 200 MG 12 hr tablet   Commonly known as:  WELLBUTRIN SR   Used for:  Moderate episode of recurrent major depressive disorder (H)        TAKE ONE TABLET BY MOUTH TWICE DAILY.   Quantity:  180 tablet   Refills:  1       diclofenac 1 % Gel topical gel   Commonly known as:  VOLTAREN   Used for:  Primary osteoarthritis of both hands        Apply  2 grams to hands four times daily using enclosed dosing card.   Quantity:  100 g   Refills:  0       escitalopram 10 MG tablet   Commonly known as:  LEXAPRO   Used for:  Moderate episode of recurrent major depressive disorder (H)        Dose:  10 mg   Take 1 tablet (10 mg) by mouth daily due for office visit for further refills.   Quantity:  90 tablet   Refills:  1       isosorbide mononitrate 30 MG 24 hr tablet   Commonly known as:  IMDUR   Used for:  Coronary artery disease involving native coronary artery  of native heart without angina pectoris        Dose:  60 mg   Take 2 tablets (60 mg) by mouth daily   Quantity:  180 tablet   Refills:  1       levothyroxine 50 MCG tablet   Commonly known as:  SYNTHROID/LEVOTHROID   Used for:  Hypothyroidism, unspecified type        Dose:  50 mcg   Take 1 tablet (50 mcg) by mouth daily   Quantity:  90 tablet   Refills:  0       LORazepam 0.5 MG tablet   Commonly known as:  ATIVAN   Used for:  Anxiety        TAKE 1/2-1 TABLET BY MOUTH EVERY 8 HOURS AS NEEDED FOR ANXIETY. DO NOT OPERATE A VEHICLE AFTER TAKING THIS MEDICATION   Quantity:  1 tablet   Refills:  0       nitroGLYcerin 0.4 MG sublingual tablet   Commonly known as:  NITROSTAT   Used for:  Coronary artery disease involving native coronary artery of native heart without angina pectoris        Dose:  0.4 mg   Place 1 tablet (0.4 mg) under the tongue every 5 minutes as needed for chest pain   Quantity:  25 tablet   Refills:  11       order for DME   Used for:  Pneumonia of right middle lobe due to infectious organism (H)        Equipment being ordered: incentive spirometry   Quantity:  1 Device   Refills:  0       pantoprazole 40 MG EC tablet   Commonly known as:  PROTONIX   Used for:  GERD (gastroesophageal reflux disease)        TAKE 1 TABLET BY MOUTH 2 TIMES DAILY. TAKE BY MOUTH 30-60 MINUTES BEFORE A MEAL   Quantity:  180 tablet   Refills:  2       umeclidinium-vilanterol 62.5-25 MCG/INH oral inhaler   Commonly known as:  ANORO ELLIPTA   Used for:  Chronic obstructive pulmonary disease, unspecified COPD type (H)        Dose:  1 puff   Inhale 1 puff into the lungs daily   Quantity:  1 Inhaler   Refills:  5            Where to get your medicines      These medications were sent to [facility] - [place], [place] - [address]  [address] [address]     Phone:  [phone]     docusate sodium 100 MG tablet    simethicone 80 MG chewable tablet         Some of these will need a paper  prescription and others can be bought over the counter. Ask your nurse if you have questions.     Bring a paper prescription for each of these medications     oxyCODONE-acetaminophen 5-325 MG per tablet                Protect others around you: Learn how to safely use, store and throw away your medicines at www.disposemymeds.org.        Information about OPIOIDS     PRESCRIPTION OPIOIDS: WHAT YOU NEED TO KNOW   You have a prescription for an opioid (narcotic) pain medicine. Opioids can cause addiction. If you have a history of chemical dependency of any type, you are at a higher risk of becoming addicted to opioids. Only take this medicine after all other options have been tried. Take it for as short a time and as few doses as possible.     Do not:    Drive. If you drive while taking these medicines, you could be arrested for driving under the influence (DUI).    Operate heavy machinery    Do any other dangerous activities while taking these medicines.     Drink any alcohol while taking these medicines.      Take with any other medicines that contain acetaminophen. Read all labels carefully. Look for the word  acetaminophen  or  Tylenol.  Ask your pharmacist if you have questions or are unsure.    Store your pills in a secure place, locked if possible. We will not replace any lost or stolen medicine. If you don t finish your medicine, please throw away (dispose) as directed by your pharmacist. The [place] Pollution Control Agency has more information about safe disposal: https://www.pca.[facility].[place].us/living-green/managing-unwanted-medications    All opioids tend to cause constipation. Drink plenty of water and eat foods that have a lot of fiber, such as fruits, vegetables, prune juice, apple juice and high-fiber cereal. Take a laxative (Miralax, milk of magnesia, Colace, Senna) if you don t move your bowels at least every other day.              Medication List: This is a list of all your medications and when to  take them. Check marks below indicate your daily home schedule. Keep this list as a reference.      Medications           Morning Afternoon Evening Bedtime As Needed    albuterol 108 (90 Base) MCG/ACT Inhaler   Commonly known as:  PROAIR HFA/PROVENTIL HFA/VENTOLIN HFA   Inhale 2 puffs into the lungs every 6 hours as needed for shortness of breath / dyspnea                                aspirin EC 81 MG EC tablet   Take 81 mg by mouth every morning                                atorvastatin 40 MG tablet   Commonly known as:  LIPITOR   Take 1 tablet (40 mg) by mouth At Bedtime                                BRILINTA 60 MG tablet   Take 60 mg by mouth daily   Generic drug:  ticagrelor                                buPROPion 200 MG 12 hr tablet   Commonly known as:  WELLBUTRIN SR   TAKE ONE TABLET BY MOUTH TWICE DAILY.   Last time this was given:  200 mg on 5/5/2018  9:03 AM                                diclofenac 1 % Gel topical gel   Commonly known as:  VOLTAREN   Apply  2 grams to hands four times daily using enclosed dosing card.                                docusate sodium 100 MG tablet   Commonly known as:  COLACE   Take 100 mg by mouth daily                                escitalopram 10 MG tablet   Commonly known as:  LEXAPRO   Take 1 tablet (10 mg) by mouth daily due for office visit for further refills.   Last time this was given:  10 mg on 5/5/2018  9:04 AM                                isosorbide mononitrate 30 MG 24 hr tablet   Commonly known as:  IMDUR   Take 2 tablets (60 mg) by mouth daily   Last time this was given:  60 mg on 5/5/2018  9:04 AM                                levothyroxine 50 MCG tablet   Commonly known as:  SYNTHROID/LEVOTHROID   Take 1 tablet (50 mcg) by mouth daily   Last time this was given:  50 mcg on 5/5/2018  9:03 AM                                LORazepam 0.5 MG tablet   Commonly known as:  ATIVAN   TAKE 1/2-1 TABLET BY MOUTH EVERY 8 HOURS AS NEEDED FOR ANXIETY. DO NOT  OPERATE A VEHICLE AFTER TAKING THIS MEDICATION                                metoprolol succinate 50 MG 24 hr tablet   Commonly known as:  TOPROL-XL   Take 1 tablet (50 mg) by mouth daily   Last time this was given:  25 mg on 5/5/2018  9:04 AM                                nitroGLYcerin 0.4 MG sublingual tablet   Commonly known as:  NITROSTAT   Place 1 tablet (0.4 mg) under the tongue every 5 minutes as needed for chest pain                                order for DME   Equipment being ordered: incentive spirometry                                oxyCODONE-acetaminophen 5-325 MG per tablet   Commonly known as:  PERCOCET   Take 1 tablet by mouth every 6 hours as needed for severe pain maximum 6 tablet(s) per day                                pantoprazole 40 MG EC tablet   Commonly known as:  PROTONIX   TAKE 1 TABLET BY MOUTH 2 TIMES DAILY. TAKE BY MOUTH 30-60 MINUTES BEFORE A MEAL   Last time this was given:  40 mg on 5/5/2018  9:03 AM                                simethicone 80 MG chewable tablet   Commonly known as:  MYLICON   Take 1 tablet (80 mg) by mouth 4 times daily as needed for cramping (gas)   Last time this was given:  160 mg on 5/4/2018  8:39 PM                                umeclidinium-vilanterol 62.5-25 MCG/INH oral inhaler   Commonly known as:  ANORO ELLIPTA   Inhale 1 puff into the lungs daily   Last time this was given:  1 puff on 5/5/2018  9:05 AM

## 2018-04-30 NOTE — PLAN OF CARE
"Problem: Patient Care Overview  Goal: Plan of Care/Patient Progress Review  Outcome: No Change  /55  Temp 99.5  F (37.5  C) (Oral)  Resp 16  Ht 1.52 m (4' 11.84\")  Wt 61.4 kg (135 lb 5.8 oz)  SpO2 95%  BMI 26.58 kg/m2  Pt. arrived to unit at approximately 1830 after Laparoscopic bilateral salpingo-oophorectomy. Pt was settled, but reported nausea and some dizziness. Family came in and pt complained of sudden shortness of breath \"I can't breathe,\" while on 2 L O2 via NC. Pt also reported not being able to take in breaths. Writer sat patient up and initiated code blue. 15 L O2 via Oxyplus mask placed over patient. Family aware of situation, new order for patient to transfer to intermediate care for closer monitoring. Report given to 6B RN. Pt will transfer once bed is available.         "

## 2018-04-30 NOTE — OR NURSING
Pt having pain in PACU despite iv narcotics. Toradol is ordered. Writer spoke with Brenda Milligan NP. Brenda Milligan NP advised writer not to administer toradol. Will continue to treat pain with ordered narcotics.

## 2018-04-30 NOTE — BRIEF OP NOTE
[facility], [place]    Brief Operative Note    Pre-operative diagnosis: Bilateral Ovarian Cysts  Post-operative diagnosis Bilateral Ovarian Cysts  Procedure: Procedure(s):  Diagnostic Laparoscopy, Laparotomy, Extensive Lysis of Adhesions, Open Bilateral Salpingo-Oophorectomy, Anesthesia Block - Wound Class: II-Clean Contaminated   - Wound Class: II-Clean Contaminated  Surgeon: Surgeon(s) and Role:     * Melody Bolivar MD - Primary     * Apolonia Bright MD - Resident - Assisting  Anesthesia: Combined General with Block   Estimated blood loss: 200 ml  Urine: 325 ml  IVF: 1300 ml  Drains: [place]  Specimens: Right fallopian tube and ovary, left fallopian tube and ovary  Findings: Pelvic mass on EUA.  Abdominal survey significant for adhesions between omentum bowel and abdominal wall. Uterus with surrounding adhesions. 45 minutes of adhesiolysis required.  Right large cystic mass adhered to uterus.  Left fallopian tube and ovary with filmy adhesions.  Frozen section benign.  Bowel and mesentery without nodularity.  Complications: None.  Implants: None.    Apolonia Bright MD  PGY-2 OBGYN  Gynecologic Oncology service pager # [phone]

## 2018-04-30 NOTE — ANESTHESIA CARE TRANSFER NOTE
Patient: Michela I Paciorek    Procedure(s):  Diagnostic Laparoscopy, Laparotomy, Extensive Lysis of Adhesions, Open Bilateral Salpingo-Oophorectomy, Anesthesia Block - Wound Class: II-Clean Contaminated   - Wound Class: II-Clean Contaminated    Diagnosis: Bilateral Ovarian Cyst   Diagnosis Additional Information: No value filed.    Anesthesia Type:   General, ETT     Note:  Airway :Face Mask  Patient transferred to:PACU  Comments: Patient oxygenating and ventilating well on 6LFM.  Patient DIEZ, following commands, and fentanyl titrated for pain reported by patient.  Report given to RN and VSS. Handoff Report: Identifed the Patient, Identified the Reponsible Provider, Reviewed the pertinent medical history, Discussed the surgical course, Reviewed Intra-OP anesthesia mangement and issues during anesthesia, Set expectations for post-procedure period and Allowed opportunity for questions and acknowledgement of understanding      Vitals: (Last set prior to Anesthesia Care Transfer)    CRNA VITALS  4/30/2018 1520 - 4/30/2018 1559      4/30/2018             NIBP: 131/88    ART BP: 134/62    Ht Rate: 67    SpO2: 100 %    Resp Rate (observed): 12    EKG: Sinus rhythm                Electronically Signed By: JOSE Doll CRNA  April 30, 2018  3:59 PM

## 2018-04-30 NOTE — ANESTHESIA PROCEDURE NOTES
Peripheral Nerve Block Procedure Note    Staff:     Anesthesiologist:  STEPHANE CASAREZ    Resident/CRNA:  LYN CHO    Block performed by resident/CRNA in the presence of a teaching physician    Location: Pre-op  Procedure Start/Stop TImes:      4/30/2018 12:05 PM     4/30/2018 12:12 PM    patient identified, IV checked, site marked, risks and benefits discussed, informed consent, monitors and equipment checked, pre-op evaluation, at physician/surgeon's request and post-op pain management      Correct Patient: Yes      Correct Position: Yes      Correct Site: Yes      Correct Procedure: Yes      Correct Laterality:  Yes    Site Marked:  Yes  Procedure details:     Procedure:  TAP    ASA:  2    Laterality:  Bilateral    Position:  Supine    Sterile Prep: chloraprep, patient draped, mask and sterile gloves      Local skin infiltration:  None    Needle:  Short bevel and insulated    Needle gauge:  21    Needle length (mm):  110    Ultrasound: Yes      Ultrasound used to identify targeted nerve, plexus, or vascular structure and placed a needle adjacent to it      Permanent Image entered into patiient's record      Abnormal pain on injection: No      Blood Aspirated: No      Paresthesias:  No    Bleeding at site: No      Test dose negative for signs of intravascular injection: Yes      Bolus via:  Needle    Infusion Method:  Single Shot    Blood aspirated via catheter: No      Complications:  None  Assessment/Narrative:     Injection made incrementally with aspirations every (mL):  5

## 2018-04-30 NOTE — IP AVS SNAPSHOT
[address] [address]    Phone:  [phone]                                       After Visit Summary   4/30/2018    Michela Cole    MRN: [id number]           After Visit Summary Signature Page     I have received my discharge instructions, and my questions have been answered. I have discussed any challenges I see with this plan with the nurse or doctor.    ..........................................................................................................................................  Patient/Patient Representative Signature      ..........................................................................................................................................  Patient Representative Print Name and Relationship to Patient    ..................................................               ................................................  Date                                            Time    ..........................................................................................................................................  Reviewed by Signature/Title    ...................................................              ..............................................  Date                                                            Time

## 2018-04-30 NOTE — OP NOTE
[facility]  Full Operative Note    Date of Service:  4/30/2018    Surgeon: Melody Bolivar MD  Assistants: Apolonia Bright MD, PGY-2      Preop Dx: Bilateral adnexal cysts  Postop Dx: Bilateral adnexal cysts, benign  Procedure: Diagnostic laparoscopy, converted to exploratory laparotomy, lysis of adhesions, bilateral salpingo-oophorectomy    Anesthesia: General endotracheal  IVF: 1300 mL crystalloid  EBL: 200 mL  Urine: 325 mL urine (with pyridium effect) at the end of the case  Drains: Molina catheter    Findings:  Pelvic mass on EUA.  Abdominal survey significant for adhesions between omentum bowel and abdominal wall. Uterus with surrounding adhesions. 45 minutes of adhesiolysis required.  Right large cystic mass adhered to uterus.  Left fallopian tube and ovary with filmy adhesions.  Frozen section benign.  Bowel and mesentery without nodularity.    Specimens:   1. Pelvic washings  2. Bilateral ovaries and salpinges    Complications: None apparent    Indications: Ms. Michela Cole is a 67 year old postmenopausal para 4 with a larger right adnexal cyst and a small left adnexal cyst. The risks, benefits, and alternatives to the procedure were discussed and she agreed to proceed.  Written informed consent was obtained.    Procedure Details:  The patient was brought to the operating room, where adequate general endotracheal anesthesia was administered. She was placed in the dorsal lithotomy position. Exam under anesthesia revealed the findings noted above. She was prepped and draped in the usual sterile fashion. Surgical time out was performed. Molina catheter was placed. An approximately 12mm incision was made approximately above the umbilicus.  A 12mm trocar was placed. Laparoscope was placed and revealed no trauma from entry. Survey of the abdomen revealed the findings noted above. Attention was turned to the left lower quadrant.  A 5 mm skin incision was made  at a point 2 cm  superomedial to the left ASIS, and a 5 mm trocar was placed atraumatically under direct visualization.  Due to extensive adhesive disease obscuring pelvic anatomy, the decision was made to proceed with laparotomy. Pelvic washings were obtained.  A vertical midline incision was made with a scalpel from 1cm above the pubic symphysis to 1cm above the umbilicus.  Subcutaneous fat was incised and dissected off the fascia with a Bovie. Fascia was opened sharply, and the abdomen was explored with the findings as described above.  The incision was extended to above the umbilicus, and the Bookwalter retractor was placed. The patient was placed in Trendelenberg, and the bowel was packed out of the pelvis.  Lysis of adhesions between the abdominal wall and the omentum was performed to improve exposure of the surgical field.  Lysis of many filmy pelvic adhesions was performed for 45 minutes.  The right round ligament was grasped with a Kocher and the broad ligament was opened with electrocautery. The ureter was identified along the medial leaf of the broad ligament using gentle blunt dissection and vermiculation was noted. A window was made in the peritoneum below the IP ligament and above the ureter. The IP ligament was then doubly clamped and ligated.  The pedicle was sutured with two serial Vicryl free ties.  The retroperitoneum was further opened anteriorly towards the uterus and bladder, and the uterine vessels were partially skeletonized.  Two large adhesions on the uterine fundus were clamped cut, and sutured in a double-layer running fashion with 3-0 Vicryl.  Filmy adhesions were lysed to remove the right tube and ovary. The same procedure was undertaken on the patient's left side to remove the left tube and ovary.   The 5mm trocar was removed.  The abdomen was copiously irrigated with warm saline.  Surgiflo was placed in the pelvis.  The bowel was unpacked and run from the ileocecal valve to the ligament of Treitz.   Partial omentectomy was performed using the Bovie and Ligasure.  The fascia was closed with two 0 Looped PCS and tied in the middle.  The subcutaneous tissue was irrigated. Simple interrutped sutures of 2-0 Vicryl were used to close the subcutaneous tissue. The skin incisions were closed with 3-0 Vicryl in a subcuticular fashion and covered with a sterile dressing. The vazquez catheter was removed.  The sponge stick in the vagina was removed.    All sponge, needle, and instrument counts were correct. The patient tolerated the procedure well and was transferred to recovery in stable condition. Dr. Bolivar was present and scrubbed for the entire procedure.    Apolonia Bright MD  Ob/Gyn, PGY-2      Attending Attestation:  I was present and scrubbed for the entire surgical procedure.  I have reviewed and edited above note and agree with findings as documented.    Melody Bolivar MD  Gynecologic Oncology  [facility]

## 2018-04-30 NOTE — OR NURSING
Bilateral TAP blocks done in pre procedure. Patient tolerated procedure well. Mild hypertension, otherwise vital signs stable.

## 2018-04-30 NOTE — DISCHARGE SUMMARY
Gynecologic Oncology Discharge Summary    Michela Cole  [id number]    Admit Date: 4/30/2018  Discharge Date: 5/5/2018   Admitting Provider: Dr. Melody Bolivar  Discharge Provider: Dr. Julieta Moreland     Admission Dx:   - COPD  - ovarian cyst  - CAD  - h/o myocardial infarction  - major depressive disorder    Discharge Dx:  - ovarian cyst  - COPD  - CAD  - h/o myocardial infarction  - major depressive disorder  - laryngeal spasm   - hypoxia with ambulation     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     Stress     Restless legs syndrome (RLS)     CAD (coronary artery disease)     Moderate episode of recurrent major depressive disorder (H)     S/P BSO (bilateral salpingo-oophorectomy)     Procedures: diagnostic laparoscopy, lysis of adhesions, bilateral salpingo-oophorectomy via vertical midline incision    Prior to Admission Medications:  Prescriptions Prior to Admission   Medication Sig Dispense Refill Last Dose     albuterol (PROAIR HFA/PROVENTIL HFA/VENTOLIN HFA) 108 (90 BASE) MCG/ACT Inhaler Inhale 2 puffs into the lungs every 6 hours as needed for shortness of breath / dyspnea 2 Inhaler 2 4/29/2018 at Unknown time     aspirin EC 81 MG tablet Take 81 mg by mouth every morning    4/23/18     atorvastatin (LIPITOR) 40 MG tablet Take 1 tablet (40 mg) by mouth At Bedtime 90 tablet 1 4/29/2018 at 2000     BRILINTA 60 MG tablet Take 60 mg by mouth daily  11 4/23/18     buPROPion (WELLBUTRIN SR) 200 MG 12 hr tablet TAKE ONE TABLET BY MOUTH TWICE DAILY. 180 tablet 1 4/29/2018 at 1800     diclofenac (VOLTAREN) 1 % GEL topical gel Apply  2 grams to hands four times daily using enclosed dosing card. 100 g 0 4/29/2018 at Unknown time      escitalopram (LEXAPRO) 10 MG tablet Take 1 tablet (10 mg) by mouth daily due for office visit for further refills. 90 tablet 1 4/29/2018 at 0800     isosorbide mononitrate (IMDUR) 30 MG 24 hr tablet Take 2 tablets (60 mg) by mouth daily 180 tablet 1 4/29/2018 at 0800     levothyroxine (SYNTHROID/LEVOTHROID) 50 MCG tablet Take 1 tablet (50 mcg) by mouth daily 90 tablet 0 4/29/2018 at 0800     LORazepam (ATIVAN) 0.5 MG tablet TAKE 1/2-1 TABLET BY MOUTH EVERY 8 HOURS AS NEEDED FOR ANXIETY. DO NOT OPERATE A VEHICLE AFTER TAKING THIS MEDICATION 1 tablet 0 More than a month at Unknown time     metoprolol (TOPROL-XL) 50 MG 24 hr tablet Take 1 tablet (50 mg) by mouth daily (Patient taking differently: Take 50 mg by mouth every morning ) 90 tablet 3 4/29/2018 at 0800     nitroglycerin (NITROSTAT) 0.4 MG sublingual tablet Place 1 tablet (0.4 mg) under the tongue every 5 minutes as needed for chest pain 25 tablet 11 More than a month at Unknown time     order for DME Equipment being ordered: incentive spirometry 1 Device 0 Taking     pantoprazole (PROTONIX) 40 MG EC tablet TAKE 1 TABLET BY MOUTH 2 TIMES DAILY. TAKE BY MOUTH 30-60 MINUTES BEFORE A MEAL 180 tablet 2 4/29/2018 at 1800     umeclidinium-vilanterol (ANORO ELLIPTA) 62.5-25 MCG/INH oral inhaler Inhale 1 puff into the lungs daily 1 Inhaler 5 4/30/2018 at 0600       Discharge Medications:     Review of your medicines      START taking       Dose / Directions    docusate sodium 100 MG tablet   Commonly known as:  COLACE   Used for:  S/P bilateral salpingo-oophorectomy        Dose:  100 mg   Take 100 mg by mouth daily   Quantity:  60 tablet   Refills:  0       oxyCODONE-acetaminophen 5-325 MG per tablet   Commonly known as:  PERCOCET   Used for:  S/P bilateral salpingo-oophorectomy        Dose:  1 tablet   Take 1 tablet by mouth every 6 hours as needed for severe pain maximum 6 tablet(s) per day   Quantity:  20 tablet   Refills:  0       simethicone 80 MG chewable tablet    Commonly known as:  MYLICON   Used for:  S/P bilateral salpingo-oophorectomy        Dose:  80 mg   Take 1 tablet (80 mg) by mouth 4 times daily as needed for cramping (gas)   Quantity:  20 tablet   Refills:  0         CONTINUE these medicines which may have CHANGED, or have new prescriptions. If we are uncertain of the size of tablets/capsules you have at home, strength may be listed as something that might have changed.       Dose / Directions    metoprolol succinate 50 MG 24 hr tablet   Commonly known as:  TOPROL-XL   This may have changed:  when to take this   Used for:  Coronary artery disease involving native coronary artery of native heart without angina pectoris        Dose:  50 mg   Take 1 tablet (50 mg) by mouth daily   Quantity:  90 tablet   Refills:  3         CONTINUE these medicines which have NOT CHANGED       Dose / Directions    albuterol 108 (90 Base) MCG/ACT Inhaler   Commonly known as:  PROAIR HFA/PROVENTIL HFA/VENTOLIN HFA   Used for:  Chronic obstructive pulmonary disease, unspecified COPD type (H)        Dose:  2 puff   Inhale 2 puffs into the lungs every 6 hours as needed for shortness of breath / dyspnea   Quantity:  2 Inhaler   Refills:  2       aspirin EC 81 MG EC tablet        Dose:  81 mg   Take 81 mg by mouth every morning   Refills:  0       atorvastatin 40 MG tablet   Commonly known as:  LIPITOR   Used for:  Hyperlipidemia LDL goal <100        Dose:  40 mg   Take 1 tablet (40 mg) by mouth At Bedtime   Quantity:  90 tablet   Refills:  1       BRILINTA 60 MG tablet   Generic drug:  ticagrelor        Dose:  60 mg   Take 60 mg by mouth daily   Refills:  11       buPROPion 200 MG 12 hr tablet   Commonly known as:  WELLBUTRIN SR   Used for:  Moderate episode of recurrent major depressive disorder (H)        TAKE ONE TABLET BY MOUTH TWICE DAILY.   Quantity:  180 tablet   Refills:  1       diclofenac 1 % Gel topical gel   Commonly known as:  VOLTAREN   Used for:  Primary osteoarthritis of  both hands        Apply  2 grams to hands four times daily using enclosed dosing card.   Quantity:  100 g   Refills:  0       escitalopram 10 MG tablet   Commonly known as:  LEXAPRO   Used for:  Moderate episode of recurrent major depressive disorder (H)        Dose:  10 mg   Take 1 tablet (10 mg) by mouth daily due for office visit for further refills.   Quantity:  90 tablet   Refills:  1       isosorbide mononitrate 30 MG 24 hr tablet   Commonly known as:  IMDUR   Used for:  Coronary artery disease involving native coronary artery of native heart without angina pectoris        Dose:  60 mg   Take 2 tablets (60 mg) by mouth daily   Quantity:  180 tablet   Refills:  1       levothyroxine 50 MCG tablet   Commonly known as:  SYNTHROID/LEVOTHROID   Used for:  Hypothyroidism, unspecified type        Dose:  50 mcg   Take 1 tablet (50 mcg) by mouth daily   Quantity:  90 tablet   Refills:  0       LORazepam 0.5 MG tablet   Commonly known as:  ATIVAN   Used for:  Anxiety        TAKE 1/2-1 TABLET BY MOUTH EVERY 8 HOURS AS NEEDED FOR ANXIETY. DO NOT OPERATE A VEHICLE AFTER TAKING THIS MEDICATION   Quantity:  1 tablet   Refills:  0       nitroGLYcerin 0.4 MG sublingual tablet   Commonly known as:  NITROSTAT   Used for:  Coronary artery disease involving native coronary artery of native heart without angina pectoris        Dose:  0.4 mg   Place 1 tablet (0.4 mg) under the tongue every 5 minutes as needed for chest pain   Quantity:  25 tablet   Refills:  11       order for DME   Used for:  Pneumonia of right middle lobe due to infectious organism (H)        Equipment being ordered: incentive spirometry   Quantity:  1 Device   Refills:  0       pantoprazole 40 MG EC tablet   Commonly known as:  PROTONIX   Used for:  GERD (gastroesophageal reflux disease)        TAKE 1 TABLET BY MOUTH 2 TIMES DAILY. TAKE BY MOUTH 30-60 MINUTES BEFORE A MEAL   Quantity:  180 tablet   Refills:  2       umeclidinium-vilanterol 62.5-25 MCG/INH oral  inhaler   Commonly known as:  ANORO ELLIPTA   Used for:  Chronic obstructive pulmonary disease, unspecified COPD type (H)        Dose:  1 puff   Inhale 1 puff into the lungs daily   Quantity:  1 Inhaler   Refills:  5            Where to get your medicines      These medications were sent to [facility] - [place], [place] - [address]  [address] [address]     Phone:  [phone]      docusate sodium 100 MG tablet     simethicone 80 MG chewable tablet         Some of these will need a paper prescription and others can be bought over the counter. Ask your nurse if you have questions.     Bring a paper prescription for each of these medications      oxyCODONE-acetaminophen 5-325 MG per tablet           Consultations:  PT/OT    Brief History of Illness:  Michela Cole is a 67 year old para 4 with a history of incidentally found large right adnexal cyst and small left adnexal cyst.    Hospital Course:  Dz:   - Preoperative diagnosis was bilateral ovarian cysts.  Frozen section at the time of the surgery was benign.  Final pathology is pending at the time of discharge.  She will follow-up postoperatively for a care plan.  FEN:   - She was maintained on IVF until POD#1, when her diet was slowly advanced.  By discharge, she was tolerating a regular diet without nausea and vomiting and able to maintain her hydration without IVF supplementation.  Pain:   - Her pain was initially controlled on a combination of IV and PO meds.  Once tolerating PO pain meds, she was transitioned to a PO pain regimen.  Her pain was well controlled on this and she was discharged home with these medications.  CV:   - She has a history of MI, CAD, and stents.  She was placed on telemetry postoperatively.  Her vital signs were stable while in house and she had no acute CV issues.  PULM:   - She has a history of COPD and was maintained on her home inhalers.  On POD#0, patient felt subjectively short  of breath and had stridor noted on her exam. A rapid response was called and the patient received IV benadryl and a racemic epi nebulizer. Her lab evaluation, CXR and EKG were unremarkable with this event and her symptoms were thought to be do to post-op laryngeal spasm given improvement with racemic epi nebulizer.  She was initially given O2 supplementation in to maintain her O2 sats in the immediate postop period. By discharge, her O2 sats were greater than 94% on RA. She was encouraged to use her bedside IS while in house.  HEME:   - Her preoperative Hgb was 13.  Her hgb dropped to 12.4 postoperatively and was stable at 11.6 at the time of discharge.  She had no other acute heme issues while in house.  GI:   - She was made NPO prior to the procedure.  On POD#0, her diet was advanced slowly as tolerated.  At the time of discharge, she was tolerating a regular diet without nausea and vomiting.  She will be discharged with a bowel regimen to prevent constipation in the postoperative period.  She was continued on her home omeprazole for her GERD. She had no acute GI issues while in house.  :    - A vazquez catheter was placed at the time of the surgery.  Once ambulating unassisted, the vazquez catheter was removed.  Prior to discharge, the patient was voiding spontaneously without difficulty.  She had no acute  issues while in house.  ID:   - The patient was AF during her hospitalization.  She received standard preoperative antibiotics without incident.    ENDO:   - The patient was continued on home synthroid for hypothyroidism.  PSYCH/NEURO:   - The patient was continued on home wellbutrin and lexapro for depression.  PPX:    - She was given SCDs, IS, PPI and lovenox during her hospital course.  She tolerated these prophylactic interventions without incident.  They were discontinued at the time of her discharge.      Discharge Instructions and Follow up:  Ms. Michela Cole was discharged from the hospital with  follow up for as scheduled with Dr. Bolivar.     Discharge Diet: Regular  Discharge Activity: Lifting and pelvic rest restrictions     Discharge Disposition:  To home    Discharge Staff: Dr. Julieta Chavez MD  OB/GYN Resident, PGY-2  5/5/2018     Julieta Moreland MD    Department of Ob/Gyn and Women's Health  Division of Gynecologic Oncology  [facility]  [phone]    I saw and evaluated the patient with the resident.  I edited and reviewed the above note.

## 2018-05-01 ENCOUNTER — APPOINTMENT (OUTPATIENT)
Dept: OCCUPATIONAL THERAPY | Facility: CLINIC | Age: 68
DRG: 742 | End: 2018-05-01
Attending: OBSTETRICS & GYNECOLOGY
Payer: MEDICARE

## 2018-05-01 ENCOUNTER — APPOINTMENT (OUTPATIENT)
Dept: PHYSICAL THERAPY | Facility: CLINIC | Age: 68
DRG: 742 | End: 2018-05-01
Attending: OBSTETRICS & GYNECOLOGY
Payer: MEDICARE

## 2018-05-01 LAB
ANION GAP SERPL CALCULATED.3IONS-SCNC: 7 MMOL/L (ref 3–14)
BUN SERPL-MCNC: 16 MG/DL (ref 7–30)
CALCIUM SERPL-MCNC: 7.9 MG/DL (ref 8.5–10.1)
CHLORIDE SERPL-SCNC: 106 MMOL/L (ref 94–109)
CO2 SERPL-SCNC: 28 MMOL/L (ref 20–32)
CREAT SERPL-MCNC: 0.94 MG/DL (ref 0.52–1.04)
ERYTHROCYTE [DISTWIDTH] IN BLOOD BY AUTOMATED COUNT: 14.1 % (ref 10–15)
GFR SERPL CREATININE-BSD FRML MDRD: 60 ML/MIN/1.7M2
GLUCOSE SERPL-MCNC: 98 MG/DL (ref 70–99)
HCT VFR BLD AUTO: 36.2 % (ref 35–47)
HGB BLD-MCNC: 11.6 G/DL (ref 11.7–15.7)
INTERPRETATION ECG - MUSE: NORMAL
INTERPRETATION ECG - MUSE: NORMAL
MCH RBC QN AUTO: 30.7 PG (ref 26.5–33)
MCHC RBC AUTO-ENTMCNC: 32 G/DL (ref 31.5–36.5)
MCV RBC AUTO: 96 FL (ref 78–100)
PLATELET # BLD AUTO: 259 10E9/L (ref 150–450)
POTASSIUM SERPL-SCNC: 3.9 MMOL/L (ref 3.4–5.3)
RBC # BLD AUTO: 3.78 10E12/L (ref 3.8–5.2)
SODIUM SERPL-SCNC: 142 MMOL/L (ref 133–144)
WBC # BLD AUTO: 12.9 10E9/L (ref 4–11)

## 2018-05-01 PROCEDURE — 36415 COLL VENOUS BLD VENIPUNCTURE: CPT | Performed by: STUDENT IN AN ORGANIZED HEALTH CARE EDUCATION/TRAINING PROGRAM

## 2018-05-01 PROCEDURE — 25000132 ZZH RX MED GY IP 250 OP 250 PS 637: Mod: GY | Performed by: STUDENT IN AN ORGANIZED HEALTH CARE EDUCATION/TRAINING PROGRAM

## 2018-05-01 PROCEDURE — 40000193 ZZH STATISTIC PT WARD VISIT: Performed by: PHYSICAL THERAPIST

## 2018-05-01 PROCEDURE — A9270 NON-COVERED ITEM OR SERVICE: HCPCS | Mod: GY | Performed by: OBSTETRICS & GYNECOLOGY

## 2018-05-01 PROCEDURE — 40000141 ZZH STATISTIC PERIPHERAL IV START W/O US GUIDANCE

## 2018-05-01 PROCEDURE — 25000125 ZZHC RX 250: Performed by: STUDENT IN AN ORGANIZED HEALTH CARE EDUCATION/TRAINING PROGRAM

## 2018-05-01 PROCEDURE — 25000125 ZZHC RX 250: Performed by: OBSTETRICS & GYNECOLOGY

## 2018-05-01 PROCEDURE — 99024 POSTOP FOLLOW-UP VISIT: CPT | Mod: GC | Performed by: OBSTETRICS & GYNECOLOGY

## 2018-05-01 PROCEDURE — 40000133 ZZH STATISTIC OT WARD VISIT

## 2018-05-01 PROCEDURE — 25000132 ZZH RX MED GY IP 250 OP 250 PS 637: Mod: GY | Performed by: OBSTETRICS & GYNECOLOGY

## 2018-05-01 PROCEDURE — 94640 AIRWAY INHALATION TREATMENT: CPT

## 2018-05-01 PROCEDURE — 25000128 H RX IP 250 OP 636: Performed by: OBSTETRICS & GYNECOLOGY

## 2018-05-01 PROCEDURE — 99231 SBSQ HOSP IP/OBS SF/LOW 25: CPT | Performed by: NURSE PRACTITIONER

## 2018-05-01 PROCEDURE — 12000006 ZZH R&B IMCU INTERMEDIATE UMMC

## 2018-05-01 PROCEDURE — 97161 PT EVAL LOW COMPLEX 20 MIN: CPT | Mod: GP | Performed by: PHYSICAL THERAPIST

## 2018-05-01 PROCEDURE — 40000274 ZZH STATISTIC RCP CONSULT EA 30 MIN

## 2018-05-01 PROCEDURE — 25000128 H RX IP 250 OP 636: Performed by: STUDENT IN AN ORGANIZED HEALTH CARE EDUCATION/TRAINING PROGRAM

## 2018-05-01 PROCEDURE — 97535 SELF CARE MNGMENT TRAINING: CPT | Mod: GO

## 2018-05-01 PROCEDURE — 97530 THERAPEUTIC ACTIVITIES: CPT | Mod: GP | Performed by: PHYSICAL THERAPIST

## 2018-05-01 PROCEDURE — 80048 BASIC METABOLIC PNL TOTAL CA: CPT | Performed by: STUDENT IN AN ORGANIZED HEALTH CARE EDUCATION/TRAINING PROGRAM

## 2018-05-01 PROCEDURE — A9270 NON-COVERED ITEM OR SERVICE: HCPCS | Mod: GY | Performed by: STUDENT IN AN ORGANIZED HEALTH CARE EDUCATION/TRAINING PROGRAM

## 2018-05-01 PROCEDURE — A9270 NON-COVERED ITEM OR SERVICE: HCPCS | Mod: GY | Performed by: NURSE PRACTITIONER

## 2018-05-01 PROCEDURE — 94640 AIRWAY INHALATION TREATMENT: CPT | Mod: 76

## 2018-05-01 PROCEDURE — 25000132 ZZH RX MED GY IP 250 OP 250 PS 637: Mod: GY | Performed by: NURSE PRACTITIONER

## 2018-05-01 PROCEDURE — 85027 COMPLETE CBC AUTOMATED: CPT | Performed by: STUDENT IN AN ORGANIZED HEALTH CARE EDUCATION/TRAINING PROGRAM

## 2018-05-01 PROCEDURE — 97116 GAIT TRAINING THERAPY: CPT | Mod: GP | Performed by: PHYSICAL THERAPIST

## 2018-05-01 PROCEDURE — 40000275 ZZH STATISTIC RCP TIME EA 10 MIN

## 2018-05-01 PROCEDURE — 97165 OT EVAL LOW COMPLEX 30 MIN: CPT | Mod: GO

## 2018-05-01 RX ORDER — HYDROMORPHONE HYDROCHLORIDE 1 MG/ML
.3-.5 INJECTION, SOLUTION INTRAMUSCULAR; INTRAVENOUS; SUBCUTANEOUS
Status: DISCONTINUED | OUTPATIENT
Start: 2018-05-01 | End: 2018-05-05 | Stop reason: HOSPADM

## 2018-05-01 RX ORDER — ISOSORBIDE MONONITRATE 30 MG/1
60 TABLET, EXTENDED RELEASE ORAL DAILY
Status: DISCONTINUED | OUTPATIENT
Start: 2018-05-01 | End: 2018-05-05 | Stop reason: HOSPADM

## 2018-05-01 RX ORDER — SIMETHICONE 80 MG
80-160 TABLET,CHEWABLE ORAL EVERY 6 HOURS PRN
Status: DISCONTINUED | OUTPATIENT
Start: 2018-05-01 | End: 2018-05-05 | Stop reason: HOSPADM

## 2018-05-01 RX ADMIN — BISACODYL 10 MG: 10 SUPPOSITORY RECTAL at 12:33

## 2018-05-01 RX ADMIN — OXYCODONE HYDROCHLORIDE 5 MG: 5 TABLET ORAL at 19:01

## 2018-05-01 RX ADMIN — MAGNESIUM HYDROXIDE 15 ML: 400 SUSPENSION ORAL at 09:25

## 2018-05-01 RX ADMIN — ISOSORBIDE MONONITRATE 60 MG: 30 TABLET, EXTENDED RELEASE ORAL at 09:22

## 2018-05-01 RX ADMIN — SODIUM CHLORIDE: 9 INJECTION, SOLUTION INTRAVENOUS at 05:15

## 2018-05-01 RX ADMIN — HYDROMORPHONE HYDROCHLORIDE 0.2 MG: 1 INJECTION, SOLUTION INTRAMUSCULAR; INTRAVENOUS; SUBCUTANEOUS at 22:04

## 2018-05-01 RX ADMIN — LEVOTHYROXINE SODIUM 50 MCG: 50 TABLET ORAL at 09:22

## 2018-05-01 RX ADMIN — BUPROPION HYDROCHLORIDE 200 MG: 200 TABLET, FILM COATED, EXTENDED RELEASE ORAL at 19:01

## 2018-05-01 RX ADMIN — ACETAMINOPHEN 650 MG: 325 TABLET, FILM COATED ORAL at 05:04

## 2018-05-01 RX ADMIN — ACETAMINOPHEN 650 MG: 325 TABLET, FILM COATED ORAL at 19:00

## 2018-05-01 RX ADMIN — OXYCODONE HYDROCHLORIDE 5 MG: 5 TABLET ORAL at 05:15

## 2018-05-01 RX ADMIN — PANTOPRAZOLE SODIUM 40 MG: 40 TABLET, DELAYED RELEASE ORAL at 09:23

## 2018-05-01 RX ADMIN — ACETAMINOPHEN 650 MG: 325 TABLET, FILM COATED ORAL at 23:51

## 2018-05-01 RX ADMIN — BUPROPION HYDROCHLORIDE 200 MG: 200 TABLET, FILM COATED, EXTENDED RELEASE ORAL at 09:24

## 2018-05-01 RX ADMIN — PANTOPRAZOLE SODIUM 40 MG: 40 TABLET, DELAYED RELEASE ORAL at 16:16

## 2018-05-01 RX ADMIN — SODIUM CHLORIDE: 9 INJECTION, SOLUTION INTRAVENOUS at 17:16

## 2018-05-01 RX ADMIN — HYDROMORPHONE HYDROCHLORIDE 0.1 MG: 1 INJECTION, SOLUTION INTRAMUSCULAR; INTRAVENOUS; SUBCUTANEOUS at 22:18

## 2018-05-01 RX ADMIN — ENOXAPARIN SODIUM 40 MG: 40 INJECTION SUBCUTANEOUS at 09:24

## 2018-05-01 RX ADMIN — ESCITALOPRAM OXALATE 10 MG: 10 TABLET ORAL at 09:22

## 2018-05-01 RX ADMIN — OXYCODONE HYDROCHLORIDE 5 MG: 5 TABLET ORAL at 09:20

## 2018-05-01 RX ADMIN — OXYCODONE HYDROCHLORIDE 5 MG: 5 TABLET ORAL at 17:11

## 2018-05-01 RX ADMIN — OXYCODONE HYDROCHLORIDE 5 MG: 5 TABLET ORAL at 12:45

## 2018-05-01 RX ADMIN — IPRATROPIUM BROMIDE AND ALBUTEROL SULFATE 3 ML: .5; 3 SOLUTION RESPIRATORY (INHALATION) at 19:28

## 2018-05-01 RX ADMIN — SIMETHICONE CHEW TAB 80 MG 160 MG: 80 TABLET ORAL at 22:18

## 2018-05-01 RX ADMIN — ONDANSETRON 4 MG: 4 TABLET, ORALLY DISINTEGRATING ORAL at 02:20

## 2018-05-01 RX ADMIN — IPRATROPIUM BROMIDE AND ALBUTEROL SULFATE 3 ML: .5; 3 SOLUTION RESPIRATORY (INHALATION) at 08:31

## 2018-05-01 RX ADMIN — SENNOSIDES AND DOCUSATE SODIUM 1 TABLET: 8.6; 5 TABLET ORAL at 19:57

## 2018-05-01 RX ADMIN — UMECLIDINIUM BROMIDE AND VILANTEROL TRIFENATATE 1 PUFF: 62.5; 25 POWDER RESPIRATORY (INHALATION) at 09:24

## 2018-05-01 RX ADMIN — ACETAMINOPHEN 650 MG: 325 TABLET, FILM COATED ORAL at 12:32

## 2018-05-01 RX ADMIN — IPRATROPIUM BROMIDE AND ALBUTEROL SULFATE 3 ML: .5; 3 SOLUTION RESPIRATORY (INHALATION) at 16:17

## 2018-05-01 RX ADMIN — IPRATROPIUM BROMIDE AND ALBUTEROL SULFATE 3 ML: .5; 3 SOLUTION RESPIRATORY (INHALATION) at 12:11

## 2018-05-01 RX ADMIN — ONDANSETRON 4 MG: 4 TABLET, ORALLY DISINTEGRATING ORAL at 10:17

## 2018-05-01 ASSESSMENT — ACTIVITIES OF DAILY LIVING (ADL)
ADLS_ACUITY_SCORE: 16
ADLS_ACUITY_SCORE: 12
PREVIOUS_RESPONSIBILITIES: MEAL PREP;HOUSEKEEPING;LAUNDRY;SHOPPING;YARDWORK;MEDICATION MANAGEMENT;FINANCES;DRIVING
ADLS_ACUITY_SCORE: 18
ADLS_ACUITY_SCORE: 13
ADLS_ACUITY_SCORE: 13
ADLS_ACUITY_SCORE: 18

## 2018-05-01 ASSESSMENT — PAIN DESCRIPTION - DESCRIPTORS
DESCRIPTORS: ACHING;RADIATING
DESCRIPTORS: ACHING
DESCRIPTORS: ACHING;RADIATING
DESCRIPTORS: ACHING
DESCRIPTORS: ACHING;RADIATING
DESCRIPTORS: ACHING;SORE
DESCRIPTORS: ACHING

## 2018-05-01 NOTE — PROGRESS NOTES
05/01/18 1000   Quick Adds   Type of Visit Initial Occupational Therapy Evaluation   Living Environment   Lives With significant other   Living Arrangements house   Home Accessibility stairs within home;stairs to enter home;stairs (1 railing present);tub/shower is not walk in   Number of Stairs Within Home 12   Transportation Available car;family or friend will provide   Living Environment Comment Pt lives with boyfriend in a mult-level home. Pt with stairs to bedroom/bathroom.    Self-Care   Dominant Hand right   Usual Activity Tolerance good   Current Activity Tolerance moderate   Equipment Currently Used at Home none   Activity/Exercise/Self-Care Comment Pt independent with ADLs and mobility prior to admission.    Functional Level Prior   Ambulation 0-->independent   Transferring 0-->independent   Toileting 0-->independent   Bathing 0-->independent   Dressing 0-->independent   Eating 0-->independent   Communication 0-->understands/communicates without difficulty   Swallowing 0-->swallows foods/liquids without difficulty   Cognition 0 - no cognition issues reported   Fall history within last six months yes   Number of times patient has fallen within last six months 1   Prior Functional Level Comment Pt reports independent with ADLs/IADLs prior to admission. Pt with x1 mechanical fall the day before surgery as pt tripped over her dog.    General Information   Onset of Illness/Injury or Date of Surgery - Date 04/30/18   Referring Physician Apolonia Bright MD   Patient/Family Goals Statement return to PLOF    Additional Occupational Profile Info/Pertinent History of Current Problem 68yo POD#1 s/p diagnostic laparoscopy converted to exploratory laparotomy, BSO and ROMÁN. Postoperative course complicated by postoperative dyspnea with rapid response activated.  Doing well this AM   Precautions/Limitations abdominal precautions;oxygen therapy device and L/min   Cognitive Status Examination   Orientation orientation to  person, place and time   Level of Consciousness alert   Cognitive Comment No deficits noted.    Visual Perception   Visual Perception No deficits were identified   Pain Assessment   Patient Currently in Pain Yes, see Vital Sign flowsheet   Integumentary/Edema   Integumentary/Edema Comments Pt with edematous in RUE and BLEs.    Range of Motion (ROM)   ROM Comment BUEs wfl    Strength   Strength Comments BUEs not formally assessed due to post op precautions, pt with AG strength anticipate 3/5 MMT or greater    Mobility   Bed Mobility Comments mod-max A for bed mobility   Transfer Skills   Transfer Comments min A for sit <> stand    Transfer Skill: Sit to Stand   Level of [place]: Sit/Stand minimum assist (75% patients effort)   Balance   Balance Comments CGA with unilateral support on IV pole, pt mild instability noted    Instrumental Activities of Daily Living (IADL)   Previous Responsibilities meal prep;housekeeping;laundry;shopping;yardwork;medication management;finances;driving   Activities of Daily Living Analysis   Impairments Contributing to Impaired Activities of Daily Living balance impaired;pain;post surgical precautions;strength decreased   General Therapy Interventions   Planned Therapy Interventions ADL retraining;IADL retraining;balance training;cognition;strengthening;transfer training;home program guidelines;progressive activity/exercise;risk factor education   Clinical Impression   Criteria for Skilled Therapeutic Interventions Met yes, treatment indicated   OT Diagnosis decreased ADL I    Influenced by the following impairments pain, post op precautions, deconditioning    Assessment of Occupational Performance 3-5 Performance Deficits   Identified Performance Deficits bed mobility, dressing, bathing, toileting, home management    Clinical Decision Making (Complexity) Low complexity   Therapy Frequency daily   Predicted Duration of Therapy Intervention (days/wks) 2 weeks    Anticipated Discharge  "Disposition Transitional Care Facility;Home with Assist   Risks and Benefits of Treatment have been explained. Yes   Patient, Family & other staff in agreement with plan of care Yes   Clinical Impression Comments Pt presents wtih pain, post op precautions, deconditioning leading to decreased ADL I. Pt to benefit from skilled OT intervention to address the above stated deficits.    [facility] TM \"6 Clicks\"   2016, Trustees of [facility], under license to [employer].  All rights reserved.   6 Clicks Short Forms Daily Activity Inpatient Short Form   [facility]  \"6 Clicks\" Daily Activity Inpatient Short Form   1. Putting on and taking off regular lower body clothing? 2 - A Lot   2. Bathing (including washing, rinsing, drying)? 2 - A Lot   3. Toileting, which includes using toilet, bedpan or urinal? 3 - A Little   4. Putting on and taking off regular upper body clothing? 3 - A Little   5. Taking care of personal grooming such as brushing teeth? 3 - A Little   6. Eating meals? 4 - None   Daily Activity Raw Score (Score out of 24.Lower scores equate to lower levels of function) 17   Total Evaluation Time   Total Evaluation Time (Minutes) 5     "

## 2018-05-01 NOTE — PROGRESS NOTES
05/01/18 1534   Quick Adds   Type of Visit Initial PT Evaluation   Living Environment   Lives With significant other;child(merna), adult;grandchild(merna)   Living Arrangements house   Home Accessibility stairs within home;stairs to enter home;stairs (1 railing present);tub/shower is not walk in   Number of Stairs to Enter Home 1  (10 inch step)   Number of Stairs Within Home 7  (split level, 7 up )   Stair Railings at Home inside, present on right side   Transportation Available car;family or friend will provide   Living Environment Comment pt lives with her boyfriend and adult children/grandkids, laundry in basement, 7 stairs to her bed/bath    Self-Care   Dominant Hand right   Usual Activity Tolerance excellent   Current Activity Tolerance moderate   Regular Exercise no   Equipment Currently Used at Home none   Activity/Exercise/Self-Care Comment Pt IND, son reports pt is extremely active, pt cleans houses for work    Functional Level Prior   Ambulation 0-->independent   Transferring 0-->independent   Toileting 0-->independent   Bathing 0-->independent   Dressing 0-->independent   Eating 0-->independent   Communication 0-->understands/communicates without difficulty   Swallowing 0-->swallows foods/liquids without difficulty   Cognition 0 - no cognition issues reported   Fall history within last six months yes   Number of times patient has fallen within last six months 1   Which of the above functional risks had a recent onset or change? fall history;ambulation   Prior Functional Level Comment pt IND prior to admission, fall before surgery as pt missed a step chasing dog    General Information   Onset of Illness/Injury or Date of Surgery - Date 04/30/18   Referring Physician Apolonia Bright MD   Patient/Family Goals Statement to reduce pain   Pertinent History of Current Problem (include personal factors and/or comorbidities that impact the POC) 67 year old female with B/L ovarian cysts, now POD #1 s/p  COMBINED  LAPAROSCOPIC HYSTERECTOMY TOTAL, SALPINGO-OOPHORECTOMY, NODE DISSECTION, TUMOR STAGING   Precautions/Limitations abdominal precautions   Heart Disease Risk Factors Age   General Observations pt supine, pleasant    General Info Comments activity orders: ambulate with assist    Cognitive Status Examination   Orientation orientation to person, place and time   Level of Consciousness alert   Follows Commands and Answers Questions 100% of the time   Personal Safety and Judgment intact   Memory intact   Pain Assessment   Patient Currently in Pain Yes, see Vital Sign flowsheet   Integumentary/Edema   Integumentary/Edema Comments abdominal incision, abdominal binder   Range of Motion (ROM)   ROM Quick Adds No deficits were identified   Strength   Manual Muscle Testing Quick Adds No deficits were identified   Strength Comments not overtly tested due to pain/straining    Bed Mobility   Bed Mobility Comments mod assist    Transfer Skills   Transfer Comments CGA/cues for STS    Gait   Gait Comments ambulates 25ft with UE support on IV Pole    Balance   Balance Comments needs UE support on IV pole for balance    Sensory Examination   Sensory Perception no deficits were identified   Sensory Perception Comments some baseline numbness on fingers comes and goes    Coordination   Coordination no deficits were identified   General Therapy Interventions   Planned Therapy Interventions progressive activity/exercise;home program guidelines;risk factor education;transfer training;strengthening;gait training;bed mobility training;balance training   Clinical Impression   Criteria for Skilled Therapeutic Intervention yes, treatment indicated   PT Diagnosis impaired mobility, pain    Influenced by the following impairments impaired strength, pain, poor endurance    Functional limitations due to impairments impaired mobility    Clinical Presentation Stable/Uncomplicated   Clinical Presentation Rationale pt with clear presentation    Clinical  "Decision Making (Complexity) Low complexity   Therapy Frequency` (6x/wk)   Predicted Duration of Therapy Intervention (days/wks) 1 week    Anticipated Discharge Disposition Transitional Care Facility;Home with Assist   Risk & Benefits of therapy have been explained Yes   Patient, Family & other staff in agreement with plan of care Yes   Clinical Impression Comments pt with below baseline mobility on POD 1, pending pain control may be appropriate for return home with assist   [facility] TM \"6 Clicks\"   2016, Trustees of [facility], under license to [employer].  All rights reserved.   6 Clicks Short Forms Basic Mobility Inpatient Short Form   [facility] AM-PAC  \"6 Clicks\" V.2 Basic Mobility Inpatient Short Form   1. Turning from your back to your side while in a flat bed without using bedrails? 4 - None   2. Moving from lying on your back to sitting on the side of a flat bed without using bedrails? 3 - A Little   3. Moving to and from a bed to a chair (including a wheelchair)? 3 - A Little   4. Standing up from a chair using your arms (e.g., wheelchair, or bedside chair)? 3 - A Little   5. To walk in hospital room? 3 - A Little   6. Climbing 3-5 steps with a railing? 2 - A Lot   Basic Mobility Raw Score (Score out of 24.Lower scores equate to lower levels of function) 18   Total Evaluation Time   Total Evaluation Time (Minutes) 8     "

## 2018-05-01 NOTE — PROGRESS NOTES
[facility]IONAL ANESTHESIA PAIN SERVICE  SUBJECTIVE  Interval history: Patient reports pain is controlled with current analgesics (see below) and nerve block. Pt reports more pain on right abdomen vs. left abdomen.  Patient is tolerating a diet, denies nausea.  Denies any weakness, paresthesias, circumoral numbness, metallic taste or tinnitus.      Clinically Aligned Pain Assessment (CAPA):  Comfort (How is your pain?): Tolerable with discomfort  Change in Pain (Since your last medication/intervention?): About the same  Pain Control (How are your pain treatments working?):  Partially effective pain control    Medications related to Pain Management (Future)    Start     Dose/Rate Route Frequency Ordered Stop    04/30/18 1830  acetaminophen (TYLENOL) tablet 650 mg      650 mg Oral EVERY 6 HOURS 04/30/18 1819 04/30/18 1830  bisacodyl (DULCOLAX) Suppository 10 mg      10 mg Rectal DAILY 04/30/18 1819 04/30/18 1822  LORazepam (ATIVAN) tablet 0.5 mg      0.5 mg Oral EVERY 8 HOURS PRN 04/30/18 1822 04/30/18 1819  lidocaine 1 % 1 mL      1 mL Other EVERY 1 HOUR PRN 04/30/18 1819 04/30/18 1819  lidocaine (LMX4) kit       Topical EVERY 1 HOUR PRN 04/30/18 1819 04/30/18 1819  oxyCODONE IR (ROXICODONE) tablet 5-10 mg      5-10 mg Oral EVERY 4 HOURS PRN 04/30/18 1819 04/30/18 1819  senna-docusate (SENOKOT-S;PERICOLACE) 8.6-50 MG per tablet 1 tablet      1 tablet Oral 2 TIMES DAILY PRN 04/30/18 1819 04/30/18 1819  senna-docusate (SENOKOT-S;PERICOLACE) 8.6-50 MG per tablet 2 tablet      2 tablet Oral 2 TIMES DAILY PRN 04/30/18 1819 04/30/18 1819  simethicone (MYLICON) chewable tablet 80 mg      80 mg Oral 4 TIMES DAILY PRN 04/30/18 1819 04/30/18 1819  HYDROmorphone (PF) (DILAUDID) injection 0.2 mg      0.2 mg Intravenous EVERY 2 HOURS PRN 04/30/18 1819 04/30/18 1638  bupivacaine liposome (EXPAREL) LONG ACTING injection was administered into the infiltration site to produce postsurgical  "analgesia. Duration of action is up to 72 hours, and other \"sarah\" medications should not be given for 96 hours with the exception of the lidocaine 5% patch (LIDODERM) and the lidocaine 10mg in potassium infusions. This entry is for INFORMATION ONLY.       Does not apply CONTINUOUS PRN 04/30/18 1638 05/04/18 1637    04/30/18 0000  oxyCODONE-acetaminophen (PERCOCET) 5-325 MG per tablet      1 tablet Oral EVERY 6 HOURS PRN 04/30/18 1136      04/30/18 0000  docusate sodium (COLACE) 100 MG tablet      100 mg Oral DAILY 04/30/18 1213            OBJECTIVE:    /59 (BP Location: Right arm)  Temp 99.3  F (37.4  C) (Oral)  Resp 10  Ht 1.52 m (4' 11.84\")  Wt 61.4 kg (135 lb 5.8 oz)  SpO2 98%  BMI 26.58 kg/m2  Exam:  General: alert, no distress and cooperative  Neuro: Strength 5/5 in LE    ASSESSMENT/PLAN:    Michela Cole is a 67 year old female with B/L ovarian cysts, now POD #1 s/p  COMBINED LAPAROSCOPIC HYSTERECTOMY TOTAL, SALPINGO-OOPHORECTOMY, NODE DISSECTION, TUMOR STAGING  COMBINED HYSTERECTOMY TOTAL ABDOMINAL, SALPINGO-OOPHORECTOMY, NODE DISSECTION, TUMOR DEBULKING with single shot injection bilateral transversus abdominis plane (TAP) nerve block.  Total bupivacaine 0.25% with epinephrine 1:200,000 20mL total and liposomal (long-acting) bupivacaine (Exparel) 1.3% 20mL total administered 4/30/18 for postop pain control.  Pt is ambulating without difficulty.  No evidence of adverse side effects associated with B/L TAP nerve block injections.  Pt acheiving adequate pain control, with nerve block, oral andIV analgesics.  Anticipate up to 72 hours of pain control with long-acting local anesthetic. Pt will continue to require multimodal analgesia for visceral/muscle/musculoskeletal pain not controlled with long-acting local anesthetic.      - NO other local anesthetic use within 96 hours of liposomal bupivacaine (Exparel), unless approved by [facility]  - patient received verbal and written instructions about " liposomal bupivacaine and counseling about pharmacologic and nonpharmacologic measures for acute postoperative pain management  - please call [facility] if questions or concerns    JOSE Daniels CNP  [facility] ([facility])  5/1/2018 10:40 AM    [facility] Contact Info (for in-house use only):  Job code ID: [id number]   [place] [facility] 0599  Peds [phone]  [place] phone: dial [phone], enter jobcode ID, then enter call-back number.    Text: Use [employer] on the Intranet <Paging/Directory> tab and enter Jobcode ID.   If no call back at any time, contact the hospital  and ask for [facility] attending or backup

## 2018-05-01 NOTE — PROGRESS NOTES
"GYN ONC PROGRESS NOTE     Date: 5/1/2018   POD#:  1  Disease: Right adnexal mass, benign of frozen section      24 hour events:   - diagnostic laparoscopy converted to open BSO, ROMÁN  - rapid response called given subjective dyspnea and stridor  - stridor improved with racemic epi   - CXR with atelectasis, EKG NSR     Subjective: Pt feeling well.  Received oxycodone to good effect.  Denies chest pain or SOB.  Has not taken in much PO, just sips with meds.  Slept through the night.  Denies nausea/vomiting.     Objective:   VS: /81 (BP Location: Right arm)  Temp 98  F (36.7  C) (Oral)  Resp 12  Ht 1.52 m (4' 11.84\")  Wt 61.4 kg (135 lb 5.8 oz)  SpO2 97%  BMI 26.58 kg/m2  Pt with 2L O2 via face mask (pt is a mouth-breather).      Exam:   Gen- Somnolent, but arouses easily to verbal stimuli   CV- Regular rate and rhythm without appreciable murmurs  Pulm- Non-labored breathing. Lung sounds are clear to auscultation bilaterally with no appreciable stridor. 2L O2 via face mask.   Abdo- Soft, non-tender and non-distended.  Incision- Vertical midline incision with overlying island dressing. Shadowing marked with no extension beyond borders  Extr- SCDs in place, non-tender, non-edematous      Lines/drains- Molina, PIV x2     New Labs/Imaging- CBC, BMP, lactate drawn with rapid response. Notable only for WBC of 11.9. Lactate nl.   Component      Latest Ref Rng & Units 4/30/2018   Ph Venous      7.32 - 7.43 pH 7.31 (L)   PCO2 Venous      40 - 50 mm Hg 55 (H)   PO2 Venous      25 - 47 mm Hg 72 (H)   Bicarbonate Venous      21 - 28 mmol/L 28   Base Excess Venous      mmol/L 0.7   FIO2       21   Magnesium      1.6 - 2.3 mg/dL 1.8   Phosphorus      2.5 - 4.5 mg/dL 3.6   PTT      22 - 37 sec 25   Troponin I ES      0.000 - 0.045 ug/L <0.015   Calcium Ionized Whole Blood      4.4 - 5.2 mg/dL 4.6       CXR 1 view:   Impression:   1. Pulmonary venous congestion without aleyda pulmonary edema.  2. Streaky left basilar opacity, " probably atelectasis.  3. No displaced rib fracture or pneumothorax.    EKG NSR    Assessment/Plan: 66yo POD#1 s/p diagnostic laparoscopy converted to exploratory laparotomy, BSO and ROMÁN. Postoperative course complicated by postoperative dyspnea with rapid response activated.  Doing well this AM     Active Problem list:    1. Post-operative state   2. Postoperative laryngeal spasm and dyspnea - resolved   3. Hx of MI with stenting  4. Hypertension   5. Hyperlipidemia  6. COPD  7. GERD  8. Hypothyroidism  9. Depression/anxiety     Dz: right cystic adnexal mass, benign on frozen  FEN: Reg diet, NS @ 75  Pain: prn Tylenol and oxycodone, Dilaudid IV prn  Heme: Hgb 13.1>  > 12.4  CV: Plan for remote telemetry given pt's hx of MI s/p stents. Takes Brilinta (held since 4/24/18) - plan to restart at discharge, metoprolol to start today for HTN. Home isosorbide and lipitor held.   Pulm: COPD - plan to con home Anoro and albuterol. Rapid reponse called for stridor and dyspnea. CXR - prelim nl, EKG NSR. Patient with noted improvement after racemic epi neb - so symptoms likely due to laryngeal spasm. Con cares in step-down 6B for close monitoring. RT consult, prn duonebs. Requiring 2L O2 to maintain oxygen saturation.   GI: GERD - con home pantoprazole  : vazquez - plan to remove today once pt is ambulatory   ID: h/o ITP - remote h/o splenectomy  Endo: Hypothyroidism con home Synthroid  Psych/Neuro/MSK: MDD con home Lexapro and wellbutrin  PPX: SCDs, IS  Dispo: Pending OT/PT reqs, but likely home with post-op goals.   Drains/Lines: PIV, vazquez     Apolonia Bright MD  PGY-2 OBGYN  Gynecologic Oncology service pager # [phone]    Julieta Moreland MD    Department of Ob/Gyn and Women's Health  Division of Gynecologic Oncology  [facility]  [phone]    I saw and evaluated the patient with Brenda Milligan.  I edited and reviewed the above note. Doing better this am. Has RUQ pain  wrapping around. Stridor resolved. Continue po pain meds and monitor pain.

## 2018-05-01 NOTE — PROGRESS NOTES
"GYN ONC PROGRESS NOTE - Post-op Check      Date: 4/30/2018   POD#:  0  Disease: Right adnexal mass, benign of frozen section      24 hour events:   - diagnostic laparoscopy converted to open BSO, ROMÁN  - rapid response called given subjective dyspnea and stridor  - stridor improved with racemic epi   - CXR with atelectasis, EKG NSR     Subjective: Pt reports feeling fine. Denies abdominal pain or distension. Denies chest pain, shortness of breath or cough. Denies nausea or vomiting.      Objective:   VS: /63 (BP Location: Right arm)  Temp 98.8  F (37.1  C) (Axillary)  Resp 12  Ht 1.52 m (4' 11.84\")  Wt 61.4 kg (135 lb 5.8 oz)  SpO2 95%  BMI 26.58 kg/m2  Pt with 2L O2 via face mask (pt is a mouth-breather).      Exam:   Gen- Somnolent, but arouses easily to verbal stimuli   CV- Regular rate and rhythm without appreciable murmurs  Pulm- Non-labored breathing. Lung sounds are clear to auscultation bilaterally with no appreciable stridor. 2L O2 via face mask.   Abdo- Soft, non-tender and non-distended.  Incision- Vertical midline incision with overlying island dressing. Shadowing marked with no extension beyond borders  Extr- SCDs in place, non-tender, non-edematous      Lines/drains- [place], PIV x2     New Labs/Imaging- CBC, BMP, lactate drawn with rapid response. Notable only for WBC of 11.9. Lactate nl.   Component      Latest Ref Rng & Units 4/30/2018   Ph Venous      7.32 - 7.43 pH 7.31 (L)   PCO2 Venous      40 - 50 mm Hg 55 (H)   PO2 Venous      25 - 47 mm Hg 72 (H)   Bicarbonate Venous      21 - 28 mmol/L 28   Base Excess Venous      mmol/L 0.7   FIO2       21   Magnesium      1.6 - 2.3 mg/dL 1.8   Phosphorus      2.5 - 4.5 mg/dL 3.6   PTT      22 - 37 sec 25   Troponin I ES      0.000 - 0.045 ug/L <0.015   Calcium Ionized Whole Blood      4.4 - 5.2 mg/dL 4.6       CXR 1 view:   Impression:   1. Pulmonary venous congestion without aleyda pulmonary edema.  2. Streaky left basilar opacity, probably " atelectasis.  3. No displaced rib fracture or pneumothorax.    EKG NSR    Assessment/Plan: 68yo POD#0 s/p diagnostic laparoscopy converted to exploratory laparotomy, BSO and ROMÁN. Postoperative course complicated by postoperative dyspnea with rapid response activated.      Active Problem list:    1. Post-operative state   2. Postoperative laryngeal spasm and dyspnea - resolved   3. Hx of MI with stenting  4. Hypertension   5. Hyperlipidemia  6. COPD  7. GERD  8. Hypothyroidism  9. Depression/anxiety     Dz: right cystic adnexal mass, benign on frozen  FEN: Reg diet, NS @ 75  Pain: prn Tylenol and oxycodone, Dilaudid IV prn  Heme: Hgb 13.1>  > 12.4  CV: Plan for remote telemetry given pt's hx of MI s/p stents. Takes Brilinta (held since 4/24/18) - plan to restart at discharge, metoprolol to start tomorrow for HTN. Home isosorbide and lipitor held.   Pulm: COPD - plan to con home Anoro and albuterol. Rapid reponse called for stridor and dyspnea. CXR - prelim nl, EKG NSR. Patient with noted improvement after racemic epi neb - so symptoms likely due to laryngeal spasm. Plan for admission to step-down 6B for close monitoring. RT consult, prn duonebs. Requiring 2L O2 to maintain oxygen saturation.   GI: GERD - con home pantoprazole  : vazquez - plan to remove POD#1 once pt is ambulatory   ID: h/o ITP - remote h/o splenectomy  Endo: Hypothyroidism con home Synthroid  Psych/Neuro/MSK: MDD con home Lexapro and wellbutrin  PPX: SCDs, IS  Dispo: Pending OT/PT reqs, but likely home with post-op goals.   Drains/Lines: taylor GRAYSON     [facility].   Sharee Rosas MD  OB/GYN Resident PGY-4  Pager: [phone]

## 2018-05-01 NOTE — SIGNIFICANT EVENT
CODE BLUE note    CODE BLUE was called at 6:30 PM for dyspnea and evidence of difficulty breathing.  Patient was found to be lying in bed appearing dyspneic with stridor noted.  She was found to have bilateral breath sounds, but stridor noted with auscultation of trachea.  She maintained O2 saturations greater than 98% while on oxygen mask.  Her heart rate was present and  throughout the event.    The patient had recently returned from PACU where she underwent diagnostic laparoscopy that reverted to open for BSO, ROMÁN.  While in the PACU she received 100 mcg of fentanyl and about 1 g of hydromorphone.    It was determined that the patient likely had laryngospasm in the setting of recent intubation.  IV Benadryl 50 mg was given once in racemic epi neb was given with resolution of her stridor and her subjective dyspnea.  Family was at bedside during the code and conferences had that the patient status post improvement of respirations.    The patient was moved to intermediate care on 6B for closer monitoring and evaluation.    Lj Lepe MD  Internal medicine PGY 2  [phone]

## 2018-05-01 NOTE — PROGRESS NOTES
"MD to the bedside to assess pt given rapid response.   Per RR team report, pt arrived to floor at 1830 with and reported sudden onset of shortness of breath while on 2L O2 via nasal cannula. RN placed 15L oxyplus mask and called a rapid response.   VS at this time were /55  Temp 99.5  F (37.5  C) (Oral)  Resp 16  Ht 1.52 m (4' 11.84\")  Wt 61.4 kg (135 lb 5.8 oz)  SpO2 95%  BMI 26.58 kg/m2.   RR team arrived and pt was given IV benadryl 50mg given concern for pssible anaphylaxis. RR team noted upper airway stridor and pt was given a racemic epi nebulizer with noted resolution of her stridor and improvement in air movement throughout all air fields. Pt was noted to maintain normal O2 saturation throughout the entire event.   On my exam, the patient appears comfortably but somnolent, likely due to recent benadryl administration. She arouses easily to verbal stimuli.   Lung exam as above.   Her abd exam is soft, non-tender, non-distended with scant shadowing on her bandage, which I marked.   LE non-tender, non-edematous  CXR - initial read is unremarkable.   Reviewed with the family that her symptoms were likely due to laryngeal spasm after extubation.   Plan for aggressive pulmonary toilet with RT consultation, q4 hrs duonebs. Will obtain stat EKG 12 lead now and then continue pt on continuous telemetry given cardiac hx. CBC, BMP, lactate also drawn. Plan to restart home metoprolol tomorrow and then home Brillinta as discharge. Will hold home isosorbide and lipitor until discharge. Con home COPD inhaler treatment anoro and albuterol prn.   Discussed with GYN ONC Fellow Uppendahl.   Will re-examine pt once she is transferred to .   Sharee Rosas MD  OB/GYN Resident PGY-4      "

## 2018-05-01 NOTE — PLAN OF CARE
Problem: Patient Care Overview  Goal: Plan of Care/Patient Progress Review  Outcome: Improving  Neuro: A&Ox4.   Cardiac: SR. VSS.  Afebrile   Respiratory: Sating > 92% on 1 L nasal cannula.  GI/: Adequate urine output. No BM this shift.  + BS.  Suppository administered as ordered.   Diet/appetite: Tolerating regular diet. Minimal appetite.    Activity:  Assist of 1 to bathroom.  Pain: At acceptable level on current regimen.  Scheduled tylenol and prn oxycodone given x 1 with adequate relief.   Skin: No new deficits noted.  Midline abdominal incision c/d/i.  Abdominal binder in place.   LDA's:  L and R PIV.     Plan: Continue with POC. Notify primary team with changes.

## 2018-05-01 NOTE — PLAN OF CARE
Problem: Patient Care Overview  Goal: Plan of Care/Patient Progress Review  Discharge Planner OT   Patient plan for discharge: home with assist   Current status: OT evaluation completed. Pt significantly limited by abdominal pain with functional transfers. Pt educated on abdominal precautions and ways to adapt, grade, and modify ADLs. Pt mod A-max A for bed mobility and min A for sit > stands. Pt ambulated hallway 10ft with CGA, unilateral support on IV pole and mild gait instability. Pt on 2L NC, VSS.   Barriers to return to prior living situation: pain, post op precautions, deconditioning   Recommendations for discharge: TCU, anticipate progress to home with assist   Rationale for recommendations: Pt currently well below baseline in ADLs and mobility, anticipate with further progress in therapies and pain control pt will be safe to return to home with assist. Will continue to update recommendations.        Entered by: Jahaira Ballard 05/01/2018 10:18 AM

## 2018-05-01 NOTE — PLAN OF CARE
Problem: Patient Care Overview  Goal: Plan of Care/Patient Progress Review  Outcome: No Change  Neuro: Disoriented to time consistently, very lethargic until 5am and then pt was Ox4 and alert.   Cardiac: SR. VSS. SBP 120s-130s.  Respiratory: Sating 95% on 2L oxiplus/NC. No SOB. RR 10-14 all night.   GI/: Adequate urine output per vazquez, yellow urine. No bm. Hypoactive bowels.   Diet/appetite: Tolerating sips with pills this AM.   Activity:  Refused repositioning.   Pain: At acceptable level on current regimen. Did not give initial tylenol dose d/t pt too lethargic to swallow pills. This am gave pt tylenol and 5mg oxi this AM when patient became alert.  Skin: No new deficits noted. Midline abd incision covered, scant drainage unchanged/marked.     Plan: Continue with POC. Notify primary team with changes.

## 2018-05-01 NOTE — PROGRESS NOTES
Pt admitted from 5B . code blue was called for dyspnea and difficulty of breathing at 5B and transferred here for close monitoring.  She is lethargic and sleepy during admission to 6B . Weaned her elissa her O2 from 6 LPM to 2 LPM. On Capnography with   EtCO2 30-40 , IP 5-6 . Gynecology primary was here to visit pt. Wound dressing C/D/I  With abdominal binder. Molina patent and infusing. 2 RN skin assessment done with Vicky WILD RN and mercedez AGUIRRE RN . Bed alarm on and functioning. Call light with in reach. Will continue to monitor.

## 2018-05-01 NOTE — PLAN OF CARE
Problem: Patient Care Overview  Goal: Plan of Care/Patient Progress Review  PT 6B: PT evaluation completed, treatment indicated.   Discharge Planner PT   Patient plan for discharge: not stated, pt aware of deficits   Current status: pt needing mod assist for bed mobility, min assist for supine to sit transfer, ambulates 25ft with IV pole and CGA. Pt limited by pain, unable to tolerate sitting up in chair. Vitals stable on 2L O2 throughout session.   Barriers to return to prior living situation: post surgical precautions, pain control, endurance, stairs at home  Recommendations for discharge: TCU vs home pending progress  Rationale for recommendations: pt currently unable to safely return home but is IND at baseline and anticipate quick progress in therapies.        Entered by: Eve Dudley 05/01/2018 4:25 PM

## 2018-05-02 ENCOUNTER — APPOINTMENT (OUTPATIENT)
Dept: PHYSICAL THERAPY | Facility: CLINIC | Age: 68
DRG: 742 | End: 2018-05-02
Attending: OBSTETRICS & GYNECOLOGY
Payer: MEDICARE

## 2018-05-02 ENCOUNTER — APPOINTMENT (OUTPATIENT)
Dept: OCCUPATIONAL THERAPY | Facility: CLINIC | Age: 68
DRG: 742 | End: 2018-05-02
Attending: OBSTETRICS & GYNECOLOGY
Payer: MEDICARE

## 2018-05-02 LAB — COPATH REPORT: NORMAL

## 2018-05-02 PROCEDURE — 97530 THERAPEUTIC ACTIVITIES: CPT | Mod: GP

## 2018-05-02 PROCEDURE — 40000133 ZZH STATISTIC OT WARD VISIT: Performed by: OCCUPATIONAL THERAPIST

## 2018-05-02 PROCEDURE — 97530 THERAPEUTIC ACTIVITIES: CPT | Mod: GO | Performed by: OCCUPATIONAL THERAPIST

## 2018-05-02 PROCEDURE — 97535 SELF CARE MNGMENT TRAINING: CPT | Mod: GO | Performed by: OCCUPATIONAL THERAPIST

## 2018-05-02 PROCEDURE — 25000125 ZZHC RX 250: Performed by: OBSTETRICS & GYNECOLOGY

## 2018-05-02 PROCEDURE — 25000132 ZZH RX MED GY IP 250 OP 250 PS 637: Mod: GY | Performed by: STUDENT IN AN ORGANIZED HEALTH CARE EDUCATION/TRAINING PROGRAM

## 2018-05-02 PROCEDURE — 25000132 ZZH RX MED GY IP 250 OP 250 PS 637: Mod: GY | Performed by: OBSTETRICS & GYNECOLOGY

## 2018-05-02 PROCEDURE — 94640 AIRWAY INHALATION TREATMENT: CPT | Mod: 76

## 2018-05-02 PROCEDURE — A9270 NON-COVERED ITEM OR SERVICE: HCPCS | Mod: GY | Performed by: NURSE PRACTITIONER

## 2018-05-02 PROCEDURE — 40000193 ZZH STATISTIC PT WARD VISIT

## 2018-05-02 PROCEDURE — 94640 AIRWAY INHALATION TREATMENT: CPT

## 2018-05-02 PROCEDURE — 25000132 ZZH RX MED GY IP 250 OP 250 PS 637: Mod: GY | Performed by: NURSE PRACTITIONER

## 2018-05-02 PROCEDURE — A9270 NON-COVERED ITEM OR SERVICE: HCPCS | Mod: GY | Performed by: STUDENT IN AN ORGANIZED HEALTH CARE EDUCATION/TRAINING PROGRAM

## 2018-05-02 PROCEDURE — 12000006 ZZH R&B IMCU INTERMEDIATE UMMC

## 2018-05-02 PROCEDURE — A9270 NON-COVERED ITEM OR SERVICE: HCPCS | Mod: GY | Performed by: OBSTETRICS & GYNECOLOGY

## 2018-05-02 PROCEDURE — 40000275 ZZH STATISTIC RCP TIME EA 10 MIN

## 2018-05-02 PROCEDURE — 99024 POSTOP FOLLOW-UP VISIT: CPT | Mod: GC | Performed by: OBSTETRICS & GYNECOLOGY

## 2018-05-02 PROCEDURE — 25000128 H RX IP 250 OP 636: Performed by: OBSTETRICS & GYNECOLOGY

## 2018-05-02 PROCEDURE — 97116 GAIT TRAINING THERAPY: CPT | Mod: GP

## 2018-05-02 RX ORDER — OXYCODONE HYDROCHLORIDE 5 MG/1
5-10 TABLET ORAL
Status: DISCONTINUED | OUTPATIENT
Start: 2018-05-02 | End: 2018-05-05 | Stop reason: HOSPADM

## 2018-05-02 RX ORDER — SIMETHICONE 80 MG
80 TABLET,CHEWABLE ORAL 4 TIMES DAILY PRN
Qty: 20 TABLET | Refills: 0 | Status: SHIPPED | OUTPATIENT
Start: 2018-05-02 | End: 2019-10-21

## 2018-05-02 RX ADMIN — ISOSORBIDE MONONITRATE 60 MG: 30 TABLET, EXTENDED RELEASE ORAL at 08:24

## 2018-05-02 RX ADMIN — ENOXAPARIN SODIUM 40 MG: 40 INJECTION SUBCUTANEOUS at 08:24

## 2018-05-02 RX ADMIN — OXYCODONE HYDROCHLORIDE 5 MG: 5 TABLET ORAL at 04:26

## 2018-05-02 RX ADMIN — PANTOPRAZOLE SODIUM 40 MG: 40 TABLET, DELAYED RELEASE ORAL at 16:12

## 2018-05-02 RX ADMIN — BUPROPION HYDROCHLORIDE 200 MG: 200 TABLET, FILM COATED, EXTENDED RELEASE ORAL at 08:24

## 2018-05-02 RX ADMIN — ACETAMINOPHEN 650 MG: 325 TABLET, FILM COATED ORAL at 06:08

## 2018-05-02 RX ADMIN — IPRATROPIUM BROMIDE AND ALBUTEROL SULFATE 3 ML: .5; 3 SOLUTION RESPIRATORY (INHALATION) at 20:01

## 2018-05-02 RX ADMIN — OXYCODONE HYDROCHLORIDE 5 MG: 5 TABLET ORAL at 22:07

## 2018-05-02 RX ADMIN — SIMETHICONE CHEW TAB 80 MG 160 MG: 80 TABLET ORAL at 16:12

## 2018-05-02 RX ADMIN — LEVOTHYROXINE SODIUM 50 MCG: 50 TABLET ORAL at 08:24

## 2018-05-02 RX ADMIN — SIMETHICONE CHEW TAB 80 MG 160 MG: 80 TABLET ORAL at 04:26

## 2018-05-02 RX ADMIN — OXYCODONE HYDROCHLORIDE 5 MG: 5 TABLET ORAL at 18:14

## 2018-05-02 RX ADMIN — PANTOPRAZOLE SODIUM 40 MG: 40 TABLET, DELAYED RELEASE ORAL at 08:24

## 2018-05-02 RX ADMIN — IPRATROPIUM BROMIDE AND ALBUTEROL SULFATE 3 ML: .5; 3 SOLUTION RESPIRATORY (INHALATION) at 16:47

## 2018-05-02 RX ADMIN — OXYCODONE HYDROCHLORIDE 5 MG: 5 TABLET ORAL at 14:20

## 2018-05-02 RX ADMIN — METOPROLOL SUCCINATE 25 MG: 25 TABLET, EXTENDED RELEASE ORAL at 08:24

## 2018-05-02 RX ADMIN — SENNOSIDES AND DOCUSATE SODIUM 2 TABLET: 8.6; 5 TABLET ORAL at 20:48

## 2018-05-02 RX ADMIN — ACETAMINOPHEN 650 MG: 325 TABLET, FILM COATED ORAL at 23:38

## 2018-05-02 RX ADMIN — IPRATROPIUM BROMIDE AND ALBUTEROL SULFATE 3 ML: .5; 3 SOLUTION RESPIRATORY (INHALATION) at 08:40

## 2018-05-02 RX ADMIN — ESCITALOPRAM OXALATE 10 MG: 10 TABLET ORAL at 08:24

## 2018-05-02 RX ADMIN — BUPROPION HYDROCHLORIDE 200 MG: 200 TABLET, FILM COATED, EXTENDED RELEASE ORAL at 20:48

## 2018-05-02 RX ADMIN — BISACODYL 10 MG: 10 SUPPOSITORY RECTAL at 08:24

## 2018-05-02 RX ADMIN — SIMETHICONE CHEW TAB 80 MG 160 MG: 80 TABLET ORAL at 22:07

## 2018-05-02 RX ADMIN — UMECLIDINIUM BROMIDE AND VILANTEROL TRIFENATATE 1 PUFF: 62.5; 25 POWDER RESPIRATORY (INHALATION) at 08:32

## 2018-05-02 RX ADMIN — ACETAMINOPHEN 650 MG: 325 TABLET, FILM COATED ORAL at 18:14

## 2018-05-02 RX ADMIN — ACETAMINOPHEN 650 MG: 325 TABLET, FILM COATED ORAL at 11:31

## 2018-05-02 ASSESSMENT — ACTIVITIES OF DAILY LIVING (ADL)
ADLS_ACUITY_SCORE: 12
ADLS_ACUITY_SCORE: 11

## 2018-05-02 ASSESSMENT — PAIN DESCRIPTION - DESCRIPTORS
DESCRIPTORS: ACHING;SORE
DESCRIPTORS: ACHING

## 2018-05-02 NOTE — PLAN OF CARE
Problem: Patient Care Overview  Goal: Plan of Care/Patient Progress Review    Discharge Planner OT   Patient plan for discharge: home  Current status: Ambulated 300 feet with CGA and IV pole, needing 1 standing rest break. SpO2 down to 86% on room air with ambulation, down from 92% at rest, although pt reporting no SOB, and VSS otherwise. Limited by occasional abdominal pain as well. Pt demonstrating understanding of ADL AE this session if needed.  Barriers to return to prior living situation: decreased ADL independence and activity tolerance  Recommendations for discharge: likely home with assist  Rationale for recommendations: anticipate pt will be able to d/c home with continued progress in therapies       Entered by: Marcus Bailey 05/02/2018 4:05 PM

## 2018-05-02 NOTE — PROVIDER NOTIFICATION
Notified onc cross cover:    Pt has hypoactive bowel sounds, denies passing flatus yet today, distended abdomen, and has been belching but denies nausea.     No new orders at this time, will continue regular diet.

## 2018-05-02 NOTE — PLAN OF CARE
Problem: Patient Care Overview  Goal: Plan of Care/Patient Progress Review  Neuro: A&Ox4.   Cardiac: SR. VSS.   Respiratory: Sating 94% on RA.  GI/: Adequate urine output. BM X1  Diet/appetite: Tolerating regular diet. Eating well.  Activity:  Stand by assist, up to chair and in halls.  Pain: At acceptable level on current regimen.   Skin: No new deficits noted.  LDA's: PIV sl'd  Plan: Continue with POC. Notify primary team with changes.

## 2018-05-02 NOTE — PLAN OF CARE
Problem: Patient Care Overview  Goal: Plan of Care/Patient Progress Review  Outcome: No Change  Temp:  [98  F (36.7  C)-99.8  F (37.7  C)] 98.7  F (37.1  C)  Heart Rate:  [66-81] 77  Resp:  [10-14] 14  BP: (110-144)/(54-81) 122/54  SpO2:  [93 %-98 %] 93 %  Shift 5542-5308  Neuro: A/Ox4  Cardiac: SR  Respiratory:  1L NC  GI/: no bm, not passing flatus, active bowel sounds. Adequate UO.  Diet/appetite:  Regular diet, little appetite. NS@75/hr.  Activity: ambulated x1 with SBA.   Pain: pain increased over the course of the evening,Oxy q4 and scheduled tylenol given. Dr. Rosas assessed pt at bedside @2200 due to markedly increased pain, pt tearful, pain to R side of abdomen radiating to back. IV dilaudid and simethicone given. Some relief after IV dilaudid.   Skin: incision approximated with liquid bandage, abdominal binder in place, no drainage.       Continue with POC. Notify primary team with changes.

## 2018-05-02 NOTE — PROGRESS NOTES
"MD to the bedside to assess pt given increase in abdominal pain.     68yo POD#2 s/p diagnostic laparoscopy converted to exploratory laparotomy, BSO and ROMÁN    Pt reports sharp stabbing abdominal pain, just on her right side that wraps around to the back. She notices this after a wave of bubbling passes through her abdomen. She reports feeling this same way after her two prior c/s when \"her bowels got stopped up.\" She has been able to tolerate regular diet but does note feeling some mild nausea now with the pain. No vomiting. She has been able to ambulate a lot tonight without symptoms. Voiding spontaneously. No flatus or BM.      Objective:   VS: /59 (BP Location: Right arm)  Temp 99.1  F (37.3  C) (Oral)  Resp 14  Ht 1.52 m (4' 11.84\")  Wt 61.4 kg (135 lb 5.8 oz)  SpO2 93%  BMI 26.58 kg/m2     Exam:   Gen- NAD, resting comfortably in bed   CV- Regular rate and rhythm without appreciable murmurs  Pulm- Non-labored breathing. Lung sounds are clear to auscultation bilaterally with no appreciable stridor.  Abdo- Soft, tenderness across the right side of the abdomen with moderate abdominal distension.   Incision- Vertical midline incision clean/dry/intact with no drainage  Extr- non-tender, non-edematous       Intake/Output Summary (Last 24 hours) at 05/01/18 2357  Last data filed at 05/01/18 2147   Gross per 24 hour   Intake             1800 ml   Output             1525 ml   Net              275 ml       Recommended IV dilaudid for breakthrough pain and added simethicone, as this is likely gas pain. Ileus vs intraabdominal surgical injury on the differential. Exam, VS and UOP remain reassuring.   Should her pain worsen, would consider imaging (AXR vs CT abd/pelvis) and laboratory evaluation. Continue to monitor closely.     Sharee Rosas MD  OB/GYN Resident PGY-4             "

## 2018-05-02 NOTE — DISCHARGE INSTRUCTIONS
GENERAL POST-OPERATIVE  PATIENT INSTRUCTIONS      FOLLOW-UP:    Call Surgeon if you have:    Temperature greater than 100.4    Persistent nausea and vomiting    Severe uncontrolled pain    Redness, tenderness, or signs of infection (pain, swelling, redness, odor or green/yellow discharge around the site)    Difficulty breathing, headache or visual disturbances    Hives    Persistent dizziness or light-headedness    Extreme fatigue    Any other questions or concerns you may have after discharge    In an emergency, call 911 or go to an Emergency Department at a nearby hospital       WOUND CARE INSTRUCTIONS:  Keep a dry clean dressing on the wound if there is drainage. The initial bandage may be removed after 24 hours.  Once the wound has quit draining you may leave it open to air.  If clothing rubs against the wound or causes irritation and the wound is not draining you may cover it with a dry dressing during the daytime.  Try to keep the wound dry and avoid ointments on the wound unless directed to do so.  If the wound becomes bright red and painful or starts to drain infected material that is not clear, please contact your physician immediately.    1.  You may shower 48 hrs after surgery   2.  No soaking in the tub        DIET:  There are no dietary restrictions.  You may eat any foods that you can tolerate.  It is a good idea to eat a high fiber diet and take in plenty of fluids to prevent constipation.  If you do become constipated you may want to take a mild laxative or take ducolax tablets on a daily basis until your bowel habits are regular.  Constipation can be very uncomfortable, along with straining, after recent surgery.    ACTIVITY:  You are encouraged to cough and deep breath or use your incentive spirometer if you were given one, every 15-30 minutes when awake.  This will help prevent respiratory complications and low grade fevers post-operatively if you had a general anesthetic.  You may want to pepe a  pillow when coughing and sneezing to add additional support to the surgical area, if you had abdominal or chest surgery, which will decrease pain during these times.       1.  No heavy lifting >20lbs or strenuous exercise for six-eight weeks.  No exercise in which you are using core muscles (yoga, pilates, swimming, weight lifting)   2.  You may walk as much as you wish.  You are encouraged to increase your   activity each day after surgery.  Stairs are okay.    3.  Nothing per vagina for eight weeks.  No tampons, no intercourse, no douching.  You can expect some light vaginal spotting and discharge for up to six weeks.  If bleeding becomes heavy, please contact the office.     MEDICATIONS:  Try to take narcotic medications and anti-inflammatory medications, such as tylenol, ibuprofen, naprosyn, etc., with food.  This will minimize stomach upset from the medication.  Should you develop nausea and vomiting from the pain medication, or develop a rash, please discontinue the medication and contact your physician.  You should not drive, make important decisions, or operate machinery when taking narcotic pain medication.    OTHER:  Patients are often constipated after general anesthesia and surgery.  The patient should continue to take stool softeners (for example, Colace or Senokot-S) for the next six weeks and consume adequate amounts of water.  If the patient remains constipated or unable to pass stool, please try one or all of the following measures:  1.  Milk of Magnesia 30cc twice a day as needed by mouth  2.  Metamucil 2 tablespoons in 12 ounces of fluid  3.  Dulcolax oral or suppositories  4.  Prunes or prune juice  5.  Miralax daily      QUESTIONS:  Please feel free to call your physician or the hospital  if you have any questions, and they will be glad to assist you.

## 2018-05-02 NOTE — PROGRESS NOTES
Care Coordinator Progress Note    Admission Date/Time:  4/30/2018  Attending MD:  Julieta Moreland MD    Data  Chart reviewed, discussed with interdisciplinary team.   Patient was admitted for:    Preop testing  S/P bilateral salpingo-oophorectomy.    Concerns with insurance coverage for discharge needs: None identified  Current Living Situation: Patient lives with SO  Support System: Rajesh STOVALL is Primary Caregiver  Services Involved:   Transportation at Discharge: Rajesh  Transportation to Medical Appointments: Rajesh  Barriers to Discharge: None identified        Assessment  Pt s/p lysis of adhesions, bilateral salpingo-oopherectomy 4/30/18. Post op Pt had laryngeal spasm and dyspnea and was transferred to .  Per Gyn/Onc anticipated discharge is tomorrow. I have met with Pt to introduce myself and care coordinator role. Prior to admission Pt was independent living with her SORajesh. Discharge planning discussed with Pt, she plans to discharge to home with support from her SO and Granddaughter.         Plan  Anticipated Discharge Date:  5/3/18  Anticipated Discharge Plan:  D/C to home with Out Patient follow up    Nancy Dumont RN   6B care coordinator #[phone]

## 2018-05-02 NOTE — PLAN OF CARE
Problem: Patient Care Overview  Goal: Plan of Care/Patient Progress Review  Outcome: No Change  Neuro: A&Ox4.   Cardiac: SR, HR 70s-80s. VSS. SBP 120s-140s.   Respiratory: Sating 95% on 1L NC. No SOB, IS encouraged, only able to do 750-1000.  GI/: Adequate urine output, no BM.   Diet/appetite: Tolerating regular diet.  Activity:  Assist of 1, ambulated to bathroom.  Pain: At acceptable level on current regimen. Simethicone and PRN oxy given x1 with scheduled tylenol.   Skin: No new deficits noted. Midline incision dry and mild redness/bruising around incision.   LDA's: NS at 75 overnight, D/C-ed by team this am.     Plan: Continue with POC. Notify primary team with changes.

## 2018-05-02 NOTE — PROGRESS NOTES
GYN ONC PROGRESS NOTE     Date: 5/2/2018   POD#: 2  Disease: Right adnexal mass, benign of frozen section      24 hour events:   - vazquez out, voiding spontaneously      Subjective: Pt feeling well. Taking small amount of PO.  Denies chest pain or SOB.  Denies nausea/vomiting.  She had some gas pain overnight that resolved with extra pain medication.     Objective:   VS:  Patient Vitals for the past 24 hrs:   BP Temp Temp src Pulse Heart Rate Resp SpO2 Weight   05/02/18 1134 120/68 98.8  F (37.1  C) Oral - 73 16 93 % -   05/02/18 1100 - - - - - - 92 % -   05/02/18 0840 - - - - - - 97 % -   05/02/18 0830 - - - - - - (!) 86 % -   05/02/18 0800 - - - - - - 94 % -   05/02/18 0749 138/75 98.1  F (36.7  C) Oral 82 82 16 95 % -   05/02/18 0420 146/80 99.5  F (37.5  C) Oral - 78 16 94 % 63.5 kg (139 lb 14.4 oz)   05/01/18 2345 111/59 99.1  F (37.3  C) Oral - 83 16 93 % -   05/01/18 2000 122/54 - - - - - 93 % -   05/01/18 1942 141/64 98.7  F (37.1  C) Oral - 77 14 94 % -   05/01/18 1928 - - - - - - 94 % -   05/01/18 1617 - - - - - - 93 % -   05/01/18 1526 110/59 98.5  F (36.9  C) Oral - 72 14 93 % -      Exam:   Gen- Somnolent, but arouses easily to verbal stimuli   CV- Regular rate and rhythm without appreciable murmurs  Pulm- Non-labored breathing. Lung sounds are clear to auscultation bilaterally  Abdo- Normoactive BS.  Soft, non-tender and non-distended.  Incision- Vertical midline incision, dry and intact, trace dried blood  Extr- SCDs in place, non-tender, non-edematous      Lines/drains- PIV x2    I/O  PO: not measured  IV: 1800 // 760  UOP: 1525 // 400    Assessment/Plan: 66yo POD#2 s/p diagnostic laparoscopy converted to exploratory laparotomy, BSO and ROMÁN. Postoperative course complicated by postoperative dyspnea with rapid response activated.  Doing well this AM.     Active Problem list:    1. Post-operative state   2. Postoperative laryngeal spasm and dyspnea - resolved   3. Hx of MI with stenting  4. Hypertension    5. Hyperlipidemia  6. COPD  7. GERD  8. Hypothyroidism  9. Depression/anxiety     Dz: right cystic adnexal mass, benign on frozen  FEN: Reg diet  Pain: prn Tylenol and oxycodone, Dilaudid IV prn  Heme: Hgb 13.1>  > 12.4  CV: Cont  remote telemetry given pt's hx of MI s/p stents.  Metoprolol and isosorbide restarted POD#1 Takes Brilinta (held since 4/24/18) - plan to restart at discharge.  Pulm: COPD - plan to cont home Anoro and albuterol. On POD#0 Rapid reponse called for stridor and dyspnea. CXR - neg w/ likely atelectasis, EKG NSR. Patient with noted improvement after racemic epi neb, symptoms likely due to laryngeal spasm. Cont cares in step-down 6B for close monitoring. RT consult, prn duonebs. Requiring 2L O2 to maintain oxygen saturation.   GI: GERD - con home pantoprazole  : s/p vazquez, voiding spontaneously   ID: h/o ITP - remote h/o splenectomy  Endo: Hypothyroidism con home Synthroid  Psych/Neuro/MSK: MDD con home Lexapro and wellbutrin  PPX: SCDs, IS  Dispo: Pending OT/PT reqs, but likely home with post-op goals.   Drains/Lines: PIV    Apolonia Bright MD  PGY-2 OBGYN  Gynecologic Oncology service pager # [phone]     Julieta Moreland MD    Department of Ob/Gyn and Women's Health  Division of Gynecologic Oncology  [facility]  [phone]    I saw and evaluated the patient with the fellow. I edited and reviewed the above note.  Tolerating po, no nausea or vomiting. +BM. Plan discharge tomorrow.

## 2018-05-02 NOTE — PLAN OF CARE
Problem: Patient Care Overview  Goal: Plan of Care/Patient Progress Review  PT/6B    Discharge Planner PT   Patient plan for discharge: Home  Current status: Pt performed bed mobility with CGA + verbal cueing for log roll technique to maintain abdominal precautions. Pt performed sit <-> stand with CGA. Pt ambulated ~70' x2 with CGA, no noted loss of balance with head turns. Pt ascended and descended 9 stairs x2 with CGA + railing for UE support.  Barriers to return to prior living situation: increased pain, decreased activity tolerance  Recommendations for discharge: Home  Rationale for recommendations: Pt progressing well with therapy and will have continued family support after discharge home. Pt performed stairs safely within precautions.        Entered by: Constance Barclay 05/02/2018 5:08 PM

## 2018-05-03 ENCOUNTER — APPOINTMENT (OUTPATIENT)
Dept: OCCUPATIONAL THERAPY | Facility: CLINIC | Age: 68
DRG: 742 | End: 2018-05-03
Attending: OBSTETRICS & GYNECOLOGY
Payer: MEDICARE

## 2018-05-03 PROCEDURE — A9270 NON-COVERED ITEM OR SERVICE: HCPCS | Mod: GY | Performed by: NURSE PRACTITIONER

## 2018-05-03 PROCEDURE — 94640 AIRWAY INHALATION TREATMENT: CPT

## 2018-05-03 PROCEDURE — 94640 AIRWAY INHALATION TREATMENT: CPT | Mod: 76

## 2018-05-03 PROCEDURE — 97535 SELF CARE MNGMENT TRAINING: CPT | Mod: GO

## 2018-05-03 PROCEDURE — 25000125 ZZHC RX 250: Performed by: OBSTETRICS & GYNECOLOGY

## 2018-05-03 PROCEDURE — 12000006 ZZH R&B IMCU INTERMEDIATE UMMC

## 2018-05-03 PROCEDURE — 25000128 H RX IP 250 OP 636: Performed by: OBSTETRICS & GYNECOLOGY

## 2018-05-03 PROCEDURE — 40000275 ZZH STATISTIC RCP TIME EA 10 MIN

## 2018-05-03 PROCEDURE — 25000132 ZZH RX MED GY IP 250 OP 250 PS 637: Mod: GY | Performed by: NURSE PRACTITIONER

## 2018-05-03 PROCEDURE — 40000133 ZZH STATISTIC OT WARD VISIT

## 2018-05-03 PROCEDURE — A9270 NON-COVERED ITEM OR SERVICE: HCPCS | Mod: GY | Performed by: STUDENT IN AN ORGANIZED HEALTH CARE EDUCATION/TRAINING PROGRAM

## 2018-05-03 PROCEDURE — 99024 POSTOP FOLLOW-UP VISIT: CPT | Mod: GC | Performed by: OBSTETRICS & GYNECOLOGY

## 2018-05-03 PROCEDURE — 25000132 ZZH RX MED GY IP 250 OP 250 PS 637: Mod: GY | Performed by: OBSTETRICS & GYNECOLOGY

## 2018-05-03 PROCEDURE — A9270 NON-COVERED ITEM OR SERVICE: HCPCS | Mod: GY | Performed by: OBSTETRICS & GYNECOLOGY

## 2018-05-03 PROCEDURE — 25000132 ZZH RX MED GY IP 250 OP 250 PS 637: Mod: GY | Performed by: STUDENT IN AN ORGANIZED HEALTH CARE EDUCATION/TRAINING PROGRAM

## 2018-05-03 PROCEDURE — 97110 THERAPEUTIC EXERCISES: CPT | Mod: GO

## 2018-05-03 RX ADMIN — OXYCODONE HYDROCHLORIDE 5 MG: 5 TABLET ORAL at 06:15

## 2018-05-03 RX ADMIN — IPRATROPIUM BROMIDE AND ALBUTEROL SULFATE 3 ML: .5; 3 SOLUTION RESPIRATORY (INHALATION) at 16:36

## 2018-05-03 RX ADMIN — UMECLIDINIUM BROMIDE AND VILANTEROL TRIFENATATE 1 PUFF: 62.5; 25 POWDER RESPIRATORY (INHALATION) at 07:52

## 2018-05-03 RX ADMIN — ACETAMINOPHEN 650 MG: 325 TABLET, FILM COATED ORAL at 17:55

## 2018-05-03 RX ADMIN — IPRATROPIUM BROMIDE AND ALBUTEROL SULFATE 3 ML: .5; 3 SOLUTION RESPIRATORY (INHALATION) at 22:01

## 2018-05-03 RX ADMIN — OXYCODONE HYDROCHLORIDE 5 MG: 5 TABLET ORAL at 02:58

## 2018-05-03 RX ADMIN — SENNOSIDES AND DOCUSATE SODIUM 2 TABLET: 8.6; 5 TABLET ORAL at 19:40

## 2018-05-03 RX ADMIN — BUPROPION HYDROCHLORIDE 200 MG: 200 TABLET, FILM COATED, EXTENDED RELEASE ORAL at 19:39

## 2018-05-03 RX ADMIN — SENNOSIDES AND DOCUSATE SODIUM 2 TABLET: 8.6; 5 TABLET ORAL at 10:17

## 2018-05-03 RX ADMIN — ACETAMINOPHEN 650 MG: 325 TABLET, FILM COATED ORAL at 23:53

## 2018-05-03 RX ADMIN — IPRATROPIUM BROMIDE AND ALBUTEROL SULFATE 3 ML: .5; 3 SOLUTION RESPIRATORY (INHALATION) at 12:20

## 2018-05-03 RX ADMIN — ACETAMINOPHEN 650 MG: 325 TABLET, FILM COATED ORAL at 05:56

## 2018-05-03 RX ADMIN — OXYCODONE HYDROCHLORIDE 10 MG: 5 TABLET ORAL at 23:54

## 2018-05-03 RX ADMIN — ESCITALOPRAM OXALATE 10 MG: 10 TABLET ORAL at 07:52

## 2018-05-03 RX ADMIN — SIMETHICONE CHEW TAB 80 MG 80 MG: 80 TABLET ORAL at 11:10

## 2018-05-03 RX ADMIN — BISACODYL 10 MG: 10 SUPPOSITORY RECTAL at 07:52

## 2018-05-03 RX ADMIN — SIMETHICONE CHEW TAB 80 MG 160 MG: 80 TABLET ORAL at 05:57

## 2018-05-03 RX ADMIN — ISOSORBIDE MONONITRATE 60 MG: 30 TABLET, EXTENDED RELEASE ORAL at 07:52

## 2018-05-03 RX ADMIN — ENOXAPARIN SODIUM 40 MG: 40 INJECTION SUBCUTANEOUS at 07:52

## 2018-05-03 RX ADMIN — SIMETHICONE CHEW TAB 80 MG 80 MG: 80 TABLET ORAL at 19:39

## 2018-05-03 RX ADMIN — IPRATROPIUM BROMIDE AND ALBUTEROL SULFATE 3 ML: .5; 3 SOLUTION RESPIRATORY (INHALATION) at 09:21

## 2018-05-03 RX ADMIN — PANTOPRAZOLE SODIUM 40 MG: 40 TABLET, DELAYED RELEASE ORAL at 16:12

## 2018-05-03 RX ADMIN — OXYCODONE HYDROCHLORIDE 5 MG: 5 TABLET ORAL at 17:55

## 2018-05-03 RX ADMIN — ACETAMINOPHEN 650 MG: 325 TABLET, FILM COATED ORAL at 12:42

## 2018-05-03 RX ADMIN — LEVOTHYROXINE SODIUM 50 MCG: 50 TABLET ORAL at 07:52

## 2018-05-03 RX ADMIN — BUPROPION HYDROCHLORIDE 200 MG: 200 TABLET, FILM COATED, EXTENDED RELEASE ORAL at 07:52

## 2018-05-03 RX ADMIN — OXYCODONE HYDROCHLORIDE 5 MG: 5 TABLET ORAL at 21:24

## 2018-05-03 RX ADMIN — OXYCODONE HYDROCHLORIDE 5 MG: 5 TABLET ORAL at 11:10

## 2018-05-03 RX ADMIN — PANTOPRAZOLE SODIUM 40 MG: 40 TABLET, DELAYED RELEASE ORAL at 07:52

## 2018-05-03 RX ADMIN — METOPROLOL SUCCINATE 25 MG: 25 TABLET, EXTENDED RELEASE ORAL at 07:52

## 2018-05-03 ASSESSMENT — ACTIVITIES OF DAILY LIVING (ADL)
ADLS_ACUITY_SCORE: 11
ADLS_ACUITY_SCORE: 12
ADLS_ACUITY_SCORE: 11

## 2018-05-03 ASSESSMENT — PAIN DESCRIPTION - DESCRIPTORS
DESCRIPTORS: DISCOMFORT
DESCRIPTORS: ACHING

## 2018-05-03 NOTE — PLAN OF CARE
Problem: Patient Care Overview  Goal: Plan of Care/Patient Progress Review  Neuro: A&Ox4.   Cardiac: SR, HR 70s-80s. VSS. SBP 120s-140s.                Respiratory: Sating 95% on 1L NC. No SOB, IS encouraged, only able to do 1000.  GI/: Adequate urine output, no BM. Distended abdomen.  Diet/appetite: Tolerating regular diet.  Activity:  Assist of stand-by, ambulated to bathroom.   Pain: At acceptable level on current regimen. Simethicone and PRN oxy given x1 with scheduled tylenol.   Skin: No new deficits noted. Midline incision dry and mild redness/bruising around incision.      Plan: Continue with POC. Notify primary team with changes.

## 2018-05-03 NOTE — PLAN OF CARE
Problem: Patient Care Overview  Goal: Plan of Care/Patient Progress Review  Discharge Planner OT   Patient plan for discharge: home with assist   Current status: Pt mod I for bed mobility and SBA for functional transfers. Pt min A for LB dressing. Pt completed sponge bath standing with SBA. Pt on RA for bathing and O2 sats 94%, pt ambulated hallway on RA an desats to 88%, pt ambulated on 2L NC with O2 sats 96%.   Barriers to return to prior living situation: oxygen requirements, pain   Recommendations for discharge: home with assist   Rationale for recommendations: recommend assist with heavier IADLs and may benefit from DME to increase independence with LB dressing. Pt planning to borrow from family/friends or purchase after discharge.        Entered by: Jahaira Ballard 05/03/2018 10:12 AM

## 2018-05-03 NOTE — PROGRESS NOTES
GYN ONC PROGRESS NOTE     Date: 5/3/2018   POD#: 3  Disease: Right adnexal mass, benign of frozen section      24 hour events:   -walking O2 test with desaturation down to 86%  -return of bowel function      Subjective: Pt feeling well. Tolerating regular diet.  Denies chest pain or SOB.  Denies nausea/vomiting.  Had a small loose BM yesterday.  Not passing flatus.     Objective:   VS:  Patient Vitals for the past 24 hrs:   BP Temp Temp src Pulse Heart Rate Resp SpO2 Weight   05/03/18 0300 134/74 99  F (37.2  C) Oral - 78 18 92 % -   05/03/18 0148 - - - - - - - 63.1 kg (139 lb 3.2 oz)   05/02/18 2341 - - - - - - 95 % -   05/02/18 2335 127/73 99.5  F (37.5  C) Oral - 75 16 (!) 89 % -   05/02/18 2023 112/72 98.8  F (37.1  C) Oral - 84 16 96 % -   05/02/18 2001 - - - - - - 94 % -   05/02/18 1647 - - - - - - 94 % -   05/02/18 1603 117/66 99  F (37.2  C) Oral - 78 16 97 % -   05/02/18 1415 - - - - - - 99 % -   05/02/18 1134 120/68 98.8  F (37.1  C) Oral - 73 16 93 % -   05/02/18 1100 - - - - - - 92 % -   05/02/18 0840 - - - - - - 97 % -   05/02/18 0830 - - - - - - (!) 86 % -   05/02/18 0800 - - - - - - 94 % -   05/02/18 0749 138/75 98.1  F (36.7  C) Oral 82 82 16 95 % -      Exam:   Gen- NAD, resting comfortably in bed  CV- Regular rate and rhythm without appreciable murmurs  Pulm- Non-labored breathing. Lung sounds are clear to auscultation bilaterally  Abdo- Normoactive BS.  Soft, non-tender, moderately distended.  Incision- Vertical midline incision, dry and intact, trace dried blood  Extr- SCDs in place, non-tender, non-edematous      Lines/drains- PIV    I/O  PO 1015 // 0   // 0  UOP (incompletely recorded) 400 // 300    Assessment/Plan: 66yo POD#3 s/p diagnostic laparoscopy converted to exploratory laparotomy, BSO and ROMÁN. Postoperative course complicated by postoperative dyspnea with rapid response activated.  Doing well this AM but with abdominal distension and awaiting flatus.      Active Problem list:     1. Post-operative state   2. Postoperative laryngeal spasm and dyspnea - resolved   3. Hx of MI with stenting  4. Hypertension   5. Hyperlipidemia  6. COPD  7. GERD  8. Hypothyroidism  9. Depression/anxiety     Dz: right cystic adnexal mass, benign on frozen  FEN: Reg diet  Pain: prn Tylenol and oxycodone, Dilaudid IV prn  Heme: Hgb 13.1>  > 12.4  CV: Cont  remote telemetry given pt's hx of MI s/p stents.  Metoprolol and isosorbide restarted POD#1. Takes Brilinta (held since 4/24/18) - plan to restart at discharge.  Pulm: COPD - plan to cont home Anoro and albuterol. On POD#0 Rapid reponse called for stridor and dyspnea - resolved. RT consult, prn duonebs. S/P walking O2 assessment yesterday. Plan for repeat today after continued pulmonary toilet.   GI: GERD - con home pantoprazole.  Bowel regimen and simethicone.  : s/p vazquez, voiding spontaneously   ID: h/o ITP - remote h/o splenectomy  Endo: Hypothyroidism con home Synthroid  Psych/Neuro/MSK: MDD con home Lexapro and wellbutrin  PPX: SCDs, IS, lovenox   Dispo: Pending OT/PT reqs, but likely home with post-op goals.   Drains/Lines: PIV    Apolonia Bright MD  PGY-2 OBGYN  Gynecologic Oncology service pager # [phone]    Julieta Moreland MD    Department of Ob/Gyn and Women's Health  Division of Gynecologic Oncology  [facility]  [phone]    I saw and evaluated the patient with the resident. I edited and reviewed the above note.  Desaturations down to 85% with walking. O2 sats ok at rest. May be due to COPD. Patient distended and tympanic today. Denies flatus. S/p 3 suppositories. Awaiting return of bowel function.

## 2018-05-03 NOTE — PLAN OF CARE
Problem: Patient Care Overview  Goal: Plan of Care/Patient Progress Review  Outcome: No Change  Temp:  [98.1  F (36.7  C)-99.5  F (37.5  C)] 98.8  F (37.1  C)  Pulse:  [82] 82  Heart Rate:  [73-84] 84  Resp:  [16] 16  BP: (111-146)/(59-80) 112/72  SpO2:  [86 %-99 %] 96 %  Shift 1138-4198    Neuro: A/Ox4    Cardiac: SR  Respiratory:  RA. Pt felt short of breath on 2nd walk. Working with IS independently.  GI/: 1 small liquid bm. Not passing flatus (cross cover MD was notified this shift). Distended abdomen, simethicone and senna given. adequate UO.  Diet/appetite: regular diet, fair appetite, feels full.   Activity: ambulated x2 in hallway with SBA  Pain: abdominal pain, oxy given q4 with some relief per pt.   Skin: midline abdominal incision without drainage, abdominal binder in place.       Continue with POC. Notify primary team with changes.

## 2018-05-04 ENCOUNTER — APPOINTMENT (OUTPATIENT)
Dept: PHYSICAL THERAPY | Facility: CLINIC | Age: 68
DRG: 742 | End: 2018-05-04
Attending: OBSTETRICS & GYNECOLOGY
Payer: MEDICARE

## 2018-05-04 LAB
ALBUMIN UR-MCNC: NEGATIVE MG/DL
APPEARANCE UR: CLEAR
BACTERIA #/AREA URNS HPF: ABNORMAL /HPF
BILIRUB UR QL STRIP: NEGATIVE
COLOR UR AUTO: ABNORMAL
GLUCOSE UR STRIP-MCNC: NEGATIVE MG/DL
HGB UR QL STRIP: NEGATIVE
KETONES UR STRIP-MCNC: NEGATIVE MG/DL
LEUKOCYTE ESTERASE UR QL STRIP: NEGATIVE
NITRATE UR QL: NEGATIVE
PH UR STRIP: 7 PH (ref 5–7)
RBC #/AREA URNS AUTO: 1 /HPF (ref 0–2)
SOURCE: ABNORMAL
SP GR UR STRIP: 1 (ref 1–1.03)
SQUAMOUS #/AREA URNS AUTO: 1 /HPF (ref 0–1)
UROBILINOGEN UR STRIP-MCNC: NORMAL MG/DL (ref 0–2)
WBC #/AREA URNS AUTO: 1 /HPF (ref 0–5)

## 2018-05-04 PROCEDURE — 40000275 ZZH STATISTIC RCP TIME EA 10 MIN

## 2018-05-04 PROCEDURE — 81001 URINALYSIS AUTO W/SCOPE: CPT | Performed by: OBSTETRICS & GYNECOLOGY

## 2018-05-04 PROCEDURE — A9270 NON-COVERED ITEM OR SERVICE: HCPCS | Mod: GY | Performed by: OBSTETRICS & GYNECOLOGY

## 2018-05-04 PROCEDURE — 25000132 ZZH RX MED GY IP 250 OP 250 PS 637: Mod: GY | Performed by: STUDENT IN AN ORGANIZED HEALTH CARE EDUCATION/TRAINING PROGRAM

## 2018-05-04 PROCEDURE — 97116 GAIT TRAINING THERAPY: CPT | Mod: GP

## 2018-05-04 PROCEDURE — 40000193 ZZH STATISTIC PT WARD VISIT

## 2018-05-04 PROCEDURE — 94640 AIRWAY INHALATION TREATMENT: CPT

## 2018-05-04 PROCEDURE — A9270 NON-COVERED ITEM OR SERVICE: HCPCS | Mod: GY | Performed by: STUDENT IN AN ORGANIZED HEALTH CARE EDUCATION/TRAINING PROGRAM

## 2018-05-04 PROCEDURE — 94640 AIRWAY INHALATION TREATMENT: CPT | Mod: 76

## 2018-05-04 PROCEDURE — 25000125 ZZHC RX 250: Performed by: OBSTETRICS & GYNECOLOGY

## 2018-05-04 PROCEDURE — 25000132 ZZH RX MED GY IP 250 OP 250 PS 637: Mod: GY | Performed by: OBSTETRICS & GYNECOLOGY

## 2018-05-04 PROCEDURE — A9270 NON-COVERED ITEM OR SERVICE: HCPCS | Mod: GY | Performed by: NURSE PRACTITIONER

## 2018-05-04 PROCEDURE — 25000128 H RX IP 250 OP 636: Performed by: OBSTETRICS & GYNECOLOGY

## 2018-05-04 PROCEDURE — 12000006 ZZH R&B IMCU INTERMEDIATE UMMC

## 2018-05-04 PROCEDURE — 97112 NEUROMUSCULAR REEDUCATION: CPT | Mod: GP

## 2018-05-04 PROCEDURE — 99024 POSTOP FOLLOW-UP VISIT: CPT | Mod: GC | Performed by: OBSTETRICS & GYNECOLOGY

## 2018-05-04 PROCEDURE — 25000132 ZZH RX MED GY IP 250 OP 250 PS 637: Mod: GY | Performed by: NURSE PRACTITIONER

## 2018-05-04 RX ORDER — IPRATROPIUM BROMIDE AND ALBUTEROL SULFATE 2.5; .5 MG/3ML; MG/3ML
3 SOLUTION RESPIRATORY (INHALATION) 2 TIMES DAILY
Status: DISCONTINUED | OUTPATIENT
Start: 2018-05-04 | End: 2018-05-05 | Stop reason: HOSPADM

## 2018-05-04 RX ADMIN — BISACODYL 10 MG: 10 SUPPOSITORY RECTAL at 07:47

## 2018-05-04 RX ADMIN — ACETAMINOPHEN 650 MG: 325 TABLET, FILM COATED ORAL at 06:00

## 2018-05-04 RX ADMIN — SIMETHICONE CHEW TAB 80 MG 80 MG: 80 TABLET ORAL at 07:46

## 2018-05-04 RX ADMIN — ENOXAPARIN SODIUM 40 MG: 40 INJECTION SUBCUTANEOUS at 07:47

## 2018-05-04 RX ADMIN — ACETAMINOPHEN 650 MG: 325 TABLET, FILM COATED ORAL at 17:55

## 2018-05-04 RX ADMIN — SIMETHICONE CHEW TAB 80 MG 160 MG: 80 TABLET ORAL at 14:21

## 2018-05-04 RX ADMIN — BUPROPION HYDROCHLORIDE 200 MG: 200 TABLET, FILM COATED, EXTENDED RELEASE ORAL at 07:47

## 2018-05-04 RX ADMIN — SENNOSIDES AND DOCUSATE SODIUM 2 TABLET: 8.6; 5 TABLET ORAL at 17:55

## 2018-05-04 RX ADMIN — UMECLIDINIUM BROMIDE AND VILANTEROL TRIFENATATE 1 PUFF: 62.5; 25 POWDER RESPIRATORY (INHALATION) at 07:47

## 2018-05-04 RX ADMIN — PANTOPRAZOLE SODIUM 40 MG: 40 TABLET, DELAYED RELEASE ORAL at 17:55

## 2018-05-04 RX ADMIN — PANTOPRAZOLE SODIUM 40 MG: 40 TABLET, DELAYED RELEASE ORAL at 07:47

## 2018-05-04 RX ADMIN — METOPROLOL SUCCINATE 25 MG: 25 TABLET, EXTENDED RELEASE ORAL at 07:47

## 2018-05-04 RX ADMIN — ISOSORBIDE MONONITRATE 60 MG: 30 TABLET, EXTENDED RELEASE ORAL at 07:47

## 2018-05-04 RX ADMIN — SIMETHICONE CHEW TAB 80 MG 80 MG: 80 TABLET ORAL at 06:00

## 2018-05-04 RX ADMIN — SIMETHICONE CHEW TAB 80 MG 160 MG: 80 TABLET ORAL at 20:39

## 2018-05-04 RX ADMIN — ESCITALOPRAM OXALATE 10 MG: 10 TABLET ORAL at 07:47

## 2018-05-04 RX ADMIN — OXYCODONE HYDROCHLORIDE 5 MG: 5 TABLET ORAL at 06:00

## 2018-05-04 RX ADMIN — BUPROPION HYDROCHLORIDE 200 MG: 200 TABLET, FILM COATED, EXTENDED RELEASE ORAL at 20:39

## 2018-05-04 RX ADMIN — IPRATROPIUM BROMIDE AND ALBUTEROL SULFATE 3 ML: .5; 3 SOLUTION RESPIRATORY (INHALATION) at 21:16

## 2018-05-04 RX ADMIN — ACETAMINOPHEN 650 MG: 325 TABLET, FILM COATED ORAL at 23:11

## 2018-05-04 RX ADMIN — LEVOTHYROXINE SODIUM 50 MCG: 50 TABLET ORAL at 07:47

## 2018-05-04 RX ADMIN — OXYCODONE HYDROCHLORIDE 5 MG: 5 TABLET ORAL at 17:55

## 2018-05-04 RX ADMIN — IPRATROPIUM BROMIDE AND ALBUTEROL SULFATE 3 ML: .5; 3 SOLUTION RESPIRATORY (INHALATION) at 08:58

## 2018-05-04 ASSESSMENT — PAIN DESCRIPTION - DESCRIPTORS
DESCRIPTORS: PRESSURE
DESCRIPTORS: DISCOMFORT
DESCRIPTORS: PRESSURE
DESCRIPTORS: PRESSURE

## 2018-05-04 ASSESSMENT — ACTIVITIES OF DAILY LIVING (ADL)
ADLS_ACUITY_SCORE: 11

## 2018-05-04 NOTE — PLAN OF CARE
"Problem: Surgery Nonspecified (Adult)  Goal: Signs and Symptoms of Listed Potential Problems Will be Absent, Minimized or Managed (Surgery Nonspecified)  Signs and symptoms of listed potential problems will be absent, minimized or managed by discharge/transition of care (reference Surgery Nonspecified (Adult) CPG).   /84 (BP Location: Right arm)  Pulse 82  Temp 97.2  F (36.2  C) (Oral)  Resp 16  Ht 1.52 m (4' 11.84\")  Wt 63.1 kg (139 lb 3.2 oz)  SpO2 97%  BMI 27.33 kg/m2    Neuro: A&Ox4.   Cardiac: VSS.  HR 70s. Max temp 99.3.   Respiratory: Sating 97% on 1L NC. LS clear. Encourage use of IS.   GI/: Adequate urine output. No BM. PRN senna given. Hypoactive faint BS. Distended abdomen. Need to have BM.   Diet/appetite: Tolerating regular diet.   Activity: Independent.   Pain: C/o abdomen/gas pain. Oxy and simethicone given with relief.   Skin: Midline incision with abdominal binder.   LDA's: PIV saline locked.     Plan: Continue with POC. Notify primary team with changes.         "

## 2018-05-04 NOTE — PLAN OF CARE
Problem: Patient Care Overview  Goal: Plan of Care/Patient Progress Review  Neuro: A&Ox4.   Cardiac: SR. VSS.   Respiratory: Sating 94-96% on RA. desated to low 80's while ambulating but recovers quickly. Denied sob or any difficulty of breathing. Using IS frequently.   GI/: Adequate urine output. No Bm a this shift , had schedule Dulcolax suppository  And prn Senna with no result so far. Pt has been belching , burping and has + bowel sounds.   Diet/appetite: Tolerating regular diet. Eating well.  Activity: independently up to chair and in halls.  Pain:  Abdominal discomfort and incisional pain , taking oxycodone and simethicone.     Skin: abdominal incision C/D/I with abdominal binder.   LDA's: PIV Sl'd.   Plan: Continue with POC. Notify primary team with changes.

## 2018-05-04 NOTE — PLAN OF CARE
Problem: Patient Care Overview  Goal: Plan of Care/Patient Progress Review  Discharge Planner PT   Patient plan for discharge: home   Current status: Pt ambulated 500'+ without AD, mod indep. Pt reporting no concerns with dc home. To promote improvement in functional stability, pt completed dynamic balance drills, compensated appropriately with no overt LOB.   Barriers to return to prior living situation: medical needs   Recommendations for discharge: home   Rationale for recommendations: Pt appears to be approaching baseline of functional mobility. Will continue to follow as IP for strengthening and activity tolerance.        Entered by: Anette Ledesma 05/04/2018 2:54 PM

## 2018-05-04 NOTE — PROGRESS NOTES
GYN ONC PROGRESS NOTE     Date: 5/4/2018   POD#: 4  Disease: Right adnexal mass, benign of frozen section      24 hour events:   -none     Subjective: Pt feeling ok.  Pain well-controlled on oral regimen.  Tolerating regular diet, but only eating very little due to distended abdomen.  Still feels distended and denies any passage of stool or gas.      Objective:   VS:  Patient Vitals for the past 24 hrs:   BP Temp Temp src Heart Rate Resp SpO2 Weight   05/04/18 0603 158/84 - - - - - -   05/04/18 0547 - - - - - - 62.5 kg (137 lb 12.8 oz)   05/04/18 0106 148/84 - - - - - -   05/03/18 2344 (!) 150/92 97.2  F (36.2  C) Oral 74 16 97 % -   05/03/18 1924 134/86 99.3  F (37.4  C) Oral 76 18 95 % -   05/03/18 1544 119/73 98.1  F (36.7  C) Oral 71 18 93 % -   05/03/18 1220 - - - - - 95 % -   05/03/18 1130 114/71 98.9  F (37.2  C) Oral 78 18 92 % -   05/03/18 1114 116/84 98.4  F (36.9  C) Oral 75 18 95 % -   05/03/18 1018 - - - - - 92 % -   05/03/18 0921 - - - - - 95 % -   05/03/18 0748 133/74 98.9  F (37.2  C) Oral 74 18 91 % -      Exam:   Gen- NAD, resting comfortably in bed  CV- Regular rate and rhythm without appreciable murmurs  Pulm- Non-labored breathing. Lung sounds are clear to auscultation bilaterally  Abdo- Normoactive BS.  Soft, non-tender, moderately distended.  Incision- Vertical midline incision, dry and intact, trace dried blood  Extr- SCDs in place, non-tender, non-edematous      Lines/drains- PIV    I/O   // 0  IV 0 // 0  UOP (incompletely recorded) 1L // 300cc    Assessment/Plan: 68yo POD#4 s/p diagnostic laparoscopy converted to exploratory laparotomy, BSO and ROMÁN. Postoperative course complicated by postoperative dyspnea with rapid response activated.      Active Problem list:    1. Post-operative state   2. Postoperative laryngeal spasm and dyspnea - resolved   3. Hx of MI with stenting  4. Hypertension    5. Hyperlipidemia  6. COPD  7. GERD  8. Hypothyroidism  9. Depression/anxiety  10. Post-operative ileus      Dz: right cystic adnexal mass, benign on frozen  FEN: Reg diet  Pain: prn Tylenol and oxycodone, Dilaudid IV prn  Heme: Hgb 13.1>  > 12.4  CV: s/p remote telemetry given pt's hx of MI s/p stents with no events. Metoprolol and isosorbide restarted POD#1. Takes Brilinta (held since 4/24/18) - plan to restart at discharge.  Pulm: COPD - plan to cont home Anoro and albuterol. On POD#0 Rapid reponse called for stridor and dyspnea - resolved. RT consult, prn duonebs. S/P walking O2 assessment yesterday. Plan for repeat today after continued pulmonary toilet.   GI: GERD - con home pantoprazole.  Bowel regimen and simethicone. Postoperative ileus.   : s/p vazquez, voiding spontaneously   ID: h/o ITP - remote h/o splenectomy  Endo: Hypothyroidism con home Synthroid  Psych/Neuro/MSK: MDD con home Lexapro and wellbutrin  PPX: SCDs, IS, lovenox   Dispo: OT - home with resolution of postoperative ileus  Drains/Lines: PIV    Sharee Rosas MD  PGY-4 OBGYN  Gynecologic Oncology service pager # [phone]    Julieta Moreland MD    Department of Ob/Gyn and Women's Health  Division of Gynecologic Oncology  [facility]  [phone]    I saw and evaluated the patient with the resident.  I edited and reviewed the above note.   Patient distended no flatus yet. Will continue to monitor until return of bowel function. Suspect ileus. Suppository today.

## 2018-05-05 VITALS
OXYGEN SATURATION: 98 % | TEMPERATURE: 98.7 F | SYSTOLIC BLOOD PRESSURE: 130 MMHG | HEIGHT: 60 IN | RESPIRATION RATE: 18 BRPM | BODY MASS INDEX: 26.66 KG/M2 | DIASTOLIC BLOOD PRESSURE: 84 MMHG | HEART RATE: 82 BPM | WEIGHT: 135.8 LBS

## 2018-05-05 LAB
CREAT SERPL-MCNC: 0.74 MG/DL (ref 0.52–1.04)
GFR SERPL CREATININE-BSD FRML MDRD: 79 ML/MIN/1.7M2
PLATELET # BLD AUTO: 344 10E9/L (ref 150–450)

## 2018-05-05 PROCEDURE — 85049 AUTOMATED PLATELET COUNT: CPT | Performed by: OBSTETRICS & GYNECOLOGY

## 2018-05-05 PROCEDURE — 25000132 ZZH RX MED GY IP 250 OP 250 PS 637: Mod: GY | Performed by: NURSE PRACTITIONER

## 2018-05-05 PROCEDURE — 94640 AIRWAY INHALATION TREATMENT: CPT

## 2018-05-05 PROCEDURE — 25000125 ZZHC RX 250: Performed by: OBSTETRICS & GYNECOLOGY

## 2018-05-05 PROCEDURE — 36415 COLL VENOUS BLD VENIPUNCTURE: CPT | Performed by: OBSTETRICS & GYNECOLOGY

## 2018-05-05 PROCEDURE — 82565 ASSAY OF CREATININE: CPT | Performed by: OBSTETRICS & GYNECOLOGY

## 2018-05-05 PROCEDURE — 25000132 ZZH RX MED GY IP 250 OP 250 PS 637: Mod: GY | Performed by: OBSTETRICS & GYNECOLOGY

## 2018-05-05 PROCEDURE — A9270 NON-COVERED ITEM OR SERVICE: HCPCS | Mod: GY | Performed by: OBSTETRICS & GYNECOLOGY

## 2018-05-05 PROCEDURE — A9270 NON-COVERED ITEM OR SERVICE: HCPCS | Mod: GY | Performed by: NURSE PRACTITIONER

## 2018-05-05 PROCEDURE — 40000275 ZZH STATISTIC RCP TIME EA 10 MIN

## 2018-05-05 PROCEDURE — A9270 NON-COVERED ITEM OR SERVICE: HCPCS | Mod: GY | Performed by: STUDENT IN AN ORGANIZED HEALTH CARE EDUCATION/TRAINING PROGRAM

## 2018-05-05 PROCEDURE — 25000128 H RX IP 250 OP 636: Performed by: OBSTETRICS & GYNECOLOGY

## 2018-05-05 PROCEDURE — 25000132 ZZH RX MED GY IP 250 OP 250 PS 637: Mod: GY | Performed by: STUDENT IN AN ORGANIZED HEALTH CARE EDUCATION/TRAINING PROGRAM

## 2018-05-05 RX ADMIN — ACETAMINOPHEN 650 MG: 325 TABLET, FILM COATED ORAL at 13:02

## 2018-05-05 RX ADMIN — OXYCODONE HYDROCHLORIDE 5 MG: 5 TABLET ORAL at 13:02

## 2018-05-05 RX ADMIN — METOPROLOL SUCCINATE 25 MG: 25 TABLET, EXTENDED RELEASE ORAL at 09:04

## 2018-05-05 RX ADMIN — BISACODYL 10 MG: 10 SUPPOSITORY RECTAL at 09:03

## 2018-05-05 RX ADMIN — ISOSORBIDE MONONITRATE 60 MG: 30 TABLET, EXTENDED RELEASE ORAL at 09:04

## 2018-05-05 RX ADMIN — ENOXAPARIN SODIUM 40 MG: 40 INJECTION SUBCUTANEOUS at 09:05

## 2018-05-05 RX ADMIN — IPRATROPIUM BROMIDE AND ALBUTEROL SULFATE 3 ML: .5; 3 SOLUTION RESPIRATORY (INHALATION) at 08:19

## 2018-05-05 RX ADMIN — BUPROPION HYDROCHLORIDE 200 MG: 200 TABLET, FILM COATED, EXTENDED RELEASE ORAL at 09:03

## 2018-05-05 RX ADMIN — ACETAMINOPHEN 650 MG: 325 TABLET, FILM COATED ORAL at 05:32

## 2018-05-05 RX ADMIN — UMECLIDINIUM BROMIDE AND VILANTEROL TRIFENATATE 1 PUFF: 62.5; 25 POWDER RESPIRATORY (INHALATION) at 09:05

## 2018-05-05 RX ADMIN — ESCITALOPRAM OXALATE 10 MG: 10 TABLET ORAL at 09:04

## 2018-05-05 RX ADMIN — PANTOPRAZOLE SODIUM 40 MG: 40 TABLET, DELAYED RELEASE ORAL at 09:03

## 2018-05-05 RX ADMIN — LEVOTHYROXINE SODIUM 50 MCG: 50 TABLET ORAL at 09:03

## 2018-05-05 ASSESSMENT — ACTIVITIES OF DAILY LIVING (ADL)
ADLS_ACUITY_SCORE: 11

## 2018-05-05 NOTE — PROGRESS NOTES
GYN ONC PROGRESS NOTE     Date: 5/5/2018   POD#: 5  Disease: Right adnexal mass, benign of frozen section      24 hour events:   -passed streak stool with suppository and small flatus      Subjective: Pt feeling ok.  Pain well-controlled on oral regimen.  Tolerating regular diet and was able to eat a little more today.  Still feels limited by distended abdomen, but no nausea or vomiting.  Voiding without issue.  Passed a streak stool with suppository and small flatus.  Walking frequently.       Objective:   VS:  Patient Vitals for the past 24 hrs:   BP Temp Temp src Heart Rate Resp SpO2 Weight   05/04/18 2042 136/88 98.4  F (36.9  C) Axillary 78 18 96 % -   05/04/18 1658 116/78 99  F (37.2  C) Oral 81 16 94 % -   05/04/18 1202 109/64 99  F (37.2  C) Oral 74 16 90 % -   05/04/18 0858 - - - - - 98 % -   05/04/18 0728 (!) 150/117 98.7  F (37.1  C) Oral 74 16 91 % -   05/04/18 0603 158/84 - - - - - -   05/04/18 0547 - - - - - - 62.5 kg (137 lb 12.8 oz)   05/04/18 0106 148/84 - - - - - -   05/03/18 2344 (!) 150/92 97.2  F (36.2  C) Oral 74 16 97 % -      Exam:   Gen- NAD, resting comfortably in bed  CV- Regular rate and rhythm without appreciable murmurs  Pulm- Non-labored breathing. Lung sounds are clear to auscultation bilaterally  Abdo- Normoactive BS.  Soft, non-tender, moderately distended.  Incision- Vertical midline incision, dry and intact, trace dried blood  Extr- SCDs in place, non-tender, non-edematous      Lines/drains- PIV      I/O:     Intake/Output Summary (Last 24 hours) at 05/05/18 0551  Last data filed at 05/04/18 2300   Gross per 24 hour   Intake             1770 ml   Output              200 ml   Net             1570 ml         Assessment/Plan: 68yo POD#5 s/p diagnostic laparoscopy converted to exploratory laparotomy, BSO and ROMÁN. Postoperative course complicated by postoperative dyspnea with rapid response activated and now postoperative ileus vs slow return of bowel function.      Active Problem  list:    1. Post-operative state   2. Postoperative laryngeal spasm and dyspnea - resolved   3. Hx of MI with stenting  4. Hypertension   5. Hyperlipidemia  6. COPD  7. GERD  8. Hypothyroidism  9. Depression/anxiety  10. Post-operative ileus      Dz: right cystic adnexal mass, benign on frozen  FEN: Reg diet  Pain: prn Tylenol and oxycodone  Heme: Hgb 13.1>  > 12.4  CV: s/p remote telemetry given pt's hx of MI s/p stents with no events. Metoprolol and isosorbide restarted POD#1. Takes Brilinta (held since 4/24/18) - plan to restart at discharge.  Pulm: COPD - plan to cont home Anoro and albuterol. On POD#0 Rapid reponse called for stridor and dyspnea - resolved. RT consult, prn duonebs. S/P walking O2 assessment yesterday. Plan for repeat today after continued pulmonary toilet prior to discharge.   GI: GERD - con home pantoprazole.  Bowel regimen and simethicone. Postoperative ileus vs slow return of bowel function. No improvement after suppository. Continue to manage expectantly.    : s/p vazquez, voiding spontaneously   ID: h/o ITP - remote h/o splenectomy  Endo: Hypothyroidism con home Synthroid  Psych/Neuro/MSK: MDD con home Lexapro and wellbutrin  PPX: SCDs, IS, lovenox   Dispo: OT - home with resolution of postoperative ileus, likely today   Drains/Lines: RENUKA Rosas MD  PGY-4 OBGYN  Gynecologic Oncology service pager # [phone]

## 2018-05-05 NOTE — PLAN OF CARE
DISCHARGE                         5/5/2018  2:32 PM  ----------------------------------------------------------------------------  Discharged to: Home  Via: private transportation  Accompanied by: Family  Discharge Instructions: diet, activity, medications, follow up appointments, when to call the MD, aftercare instructions.  Prescriptions: To be filled by  Discharge pharmacy; medication list reviewed & sent with pt  Follow Up Appointments: arranged; information given  Belongings: All sent with pt  IV: d/c'd  Telemetry: d/c'd  Pt exhibits understanding of above discharge instructions; all questions answered.    Discharge Paperwork: Signed, copied, and sent home with patient.

## 2018-05-05 NOTE — PLAN OF CARE
Problem: Patient Care Overview  Goal: Plan of Care/Patient Progress Review  Outcome: Improving  Neuro: A&Ox4  Cardiac: HR 70-80s, VSS on RA.  Resp: On RA sating high 90s, continue to encourage IS  GI/: Voiding spontaneously. (+) gas, (-) BM. Distended. BS(+) all 4 quadrants. Midline incision with slight erythema around staples. Open to air, CDI.  Diet/Appetite:  Tolerating small amounts of regular diet, denies nausea  Skin: No new deficits  Access: L) PIV SL  Drains: none  Activity: Up ad raisa, ambulating halls frequently  Pain: Scheduled Tylenol, PRN Oxy available but patient has not requested this shift.    Plan to possibly DC home today.    Will continue with plan of care and notify MD of any changes.

## 2018-05-05 NOTE — PROGRESS NOTES
"MD to the bedside to assess pt given abdominal pain this evening with abdominal distension.   Pt reports that she is feeling well. Pain is well-controlled on oral regimen and she continues to tolerate small amounts of a regular diet without nausea or vomiting. She notes passing a small amount of flatus this evening. Is voiding spontaneously and ambulating frequently without issue. Continues on stool softeners.   VS: /88 (BP Location: Left arm)  Pulse 82  Temp 98.4  F (36.9  C) (Axillary)  Resp 18  Ht 1.52 m (4' 11.84\")  Wt 62.5 kg (137 lb 12.8 oz)  SpO2 96%  BMI 27.05 kg/m2  Gen: NAD, walking about the room without issue  Abd: soft, non-tender, improved abdominal distension  Pt continues with postoperative ileus vs slow return of bowel function, but actually appears improved on my exam and passed small flatus. Anticipate D/C tomorrow.  Sharee Rosas MD  OB/GYN Resident PGY-4  "

## 2018-05-05 NOTE — PROGRESS NOTES
Discharge on hold today. Unable to pass flatus. +BS all four quadrants. Had a very small tan BM. Increased pain and distension this evening. Gyn/Onc resident notified. No new intervention. Resident will assess pt at bedside this evening. Also, requested scheduled senna and additional bowel medications. Ambulated in hallways several times today and has been very motivated to get home. Continue to monitor.

## 2018-05-05 NOTE — PLAN OF CARE
Problem: Patient Care Overview  Goal: Plan of Care/Patient Progress Review  Occupational Therapy Discharge Summary    Reason for therapy discharge:    Discharged to home.    Progress towards therapy goal(s). See goals on Care Plan in [facility] electronic health record for goal details.  Goals partially met.  Barriers to achieving goals:   discharge from facility.    Therapy recommendation(s):    Continue home exercise program. Family can A prn w/ heavy lifting.

## 2018-05-06 LAB — COPATH REPORT: NORMAL

## 2018-05-07 NOTE — PLAN OF CARE
Problem: Patient Care Overview  Goal: Plan of Care/Patient Progress Review  Physical Therapy Discharge Summary    Reason for therapy discharge:    Discharged to home.    Progress towards therapy goal(s). See goals on Care Plan in [facility] electronic health record for goal details.  Goals met    Therapy recommendation(s):    Continued therapy is recommended.  Rationale/Recommendations:  Pt appears to be approaching baseline of functional mobility. Will continue to follow as IP for strengthening and activity tolerance. .

## 2018-05-15 ENCOUNTER — ONCOLOGY VISIT (OUTPATIENT)
Dept: ONCOLOGY | Facility: CLINIC | Age: 68
End: 2018-05-15
Attending: OBSTETRICS & GYNECOLOGY
Payer: COMMERCIAL

## 2018-05-15 VITALS
SYSTOLIC BLOOD PRESSURE: 113 MMHG | BODY MASS INDEX: 26.11 KG/M2 | HEIGHT: 60 IN | OXYGEN SATURATION: 98 % | WEIGHT: 133 LBS | HEART RATE: 81 BPM | DIASTOLIC BLOOD PRESSURE: 67 MMHG | RESPIRATION RATE: 16 BRPM | TEMPERATURE: 98.2 F

## 2018-05-15 DIAGNOSIS — N83.209 CYST OF OVARY, UNSPECIFIED LATERALITY: Primary | ICD-10-CM

## 2018-05-15 PROCEDURE — 99214 OFFICE O/P EST MOD 30 MIN: CPT | Mod: ZP | Performed by: OBSTETRICS & GYNECOLOGY

## 2018-05-15 PROCEDURE — G0463 HOSPITAL OUTPT CLINIC VISIT: HCPCS | Mod: ZF

## 2018-05-15 ASSESSMENT — PAIN SCALES - GENERAL: PAINLEVEL: MILD PAIN (2)

## 2018-05-15 NOTE — MR AVS SNAPSHOT
"              After Visit Summary   5/15/2018    Michela Cole    MRN: [id number]           Patient Information     Date Of Birth          1950        Visit Information        Provider Department      5/15/2018 1:00 PM Melody Bolivar MD [facility]        Today's Diagnoses     Cyst of ovary, unspecified laterality    -  1       Follow-ups after your visit        Who to contact     If you have questions or need follow up information about today's clinic visit or your schedule please contact [facility] directly at [phone].  Normal or non-critical lab and imaging results will be communicated to you by Greasebookhart, letter or phone within 4 business days after the clinic has received the results. If you do not hear from us within 7 days, please contact the clinic through Greasebookhart or phone. If you have a critical or abnormal lab result, we will notify you by phone as soon as possible.  Submit refill requests through Trulia or call your pharmacy and they will forward the refill request to us. Please allow 3 business days for your refill to be completed.          Additional Information About Your Visit        MyChart Information     Trulia lets you send messages to your doctor, view your test results, renew your prescriptions, schedule appointments and more. To sign up, go to [email or web address] . Click on \"Log in\" on the left side of the screen, which will take you to the Welcome page. Then click on \"Sign up Now\" on the right side of the page.     You will be asked to enter the access code listed below, as well as some personal information. Please follow the directions to create your username and password.     Your access code is: [email or web address]  Expires: 2018  2:42 PM     Your access code will  in 90 days. If you need help or a new code, please call your [facility] or [phone].        Care EveryWhere ID     This is your Care EveryWhere ID. This " "could be used by other organizations to access your [place] medical records  [id number]        Your Vitals Were     Pulse Temperature Respirations Height Pulse Oximetry BMI (Body Mass Index)    81 98.2  F (36.8  C) (Oral) 16 1.518 m (4' 11.75\") 98% 26.19 kg/m2       Blood Pressure from Last 3 Encounters:   05/15/18 113/67   05/05/18 130/84   04/26/18 107/62    Weight from Last 3 Encounters:   05/15/18 60.3 kg (133 lb)   05/05/18 61.6 kg (135 lb 12.8 oz)   04/26/18 60.9 kg (134 lb 4.8 oz)              Today, you had the following     No orders found for display         Today's Medication Changes          These changes are accurate as of 5/15/18 11:59 PM.  If you have any questions, ask your nurse or doctor.               These medicines have changed or have updated prescriptions.        Dose/Directions    metoprolol succinate 50 MG 24 hr tablet   Commonly known as:  TOPROL-XL   This may have changed:  when to take this   Used for:  Coronary artery disease involving native coronary artery of native heart without angina pectoris        Dose:  50 mg   Take 1 tablet (50 mg) by mouth daily   Quantity:  90 tablet   Refills:  3                Primary Care Provider Office Phone # Fax #    Frannie Sharon Allen [phone]       [address] [facility] [address]        Goals        General    Functional (pt-stated)     Notes - Note created  9/1/2015 11:20 AM by Janneth Orellana RN    I will rinse my mouth out after using my inhaler to help keep the lining of my mouth healthy  As of today's date 9/1/2015 goal is met at 0 - 25%.   Goal Status:  Ongoing      Medication (pt-stated)     Notes - Note edited  9/1/2015  2:10 PM by Janneth Orellana RN    I will use my inhalers as instructed  As of today's date 9/1/2015 goal is met at 0 - 25%.   Goal Status:  Activeedule  Appointment with ENT       Psychosocial (pt-stated)     Notes - Note edited  9/1/2015  2:10 PM by Janneth Orellana, RN    I will  Investigate " counseling and/or support groups if and when I am ready to get formal support  As of today's date 9/1/2015 goal is met at 0 - 25%.   Goal Status:  Ongoing        Equal Access to Services     ALEXANDER RODRIGUEZ : Zak Quispe, franciscopatricia warnermarleeha, latricia murrieta, charline dsouzaargentina clarkeurmila suresh benji rose. So [facility] [phone].    ATENCIÓN: Si habla español, tiene a de la rosa disposición servicios gratuitos de asistencia lingüística. Llame al [phone].    We comply with applicable federal civil rights laws and [place] laws. We do not discriminate on the basis of race, color, national origin, age, disability, sex, sexual orientation, or gender identity.            Thank you!     Thank you for choosing [facility]  for your care. Our goal is always to provide you with excellent care. Hearing back from our patients is one way we can continue to improve our services. Please take a few minutes to complete the written survey that you may receive in the mail after your visit with us. Thank you!             Your Updated Medication List - Protect others around you: Learn how to safely use, store and throw away your medicines at www.disposemymeds.org.          This list is accurate as of 5/15/18 11:59 PM.  Always use your most recent med list.                   Brand Name Dispense Instructions for use Diagnosis    albuterol 108 (90 Base) MCG/ACT Inhaler    PROAIR HFA/PROVENTIL HFA/VENTOLIN HFA    2 Inhaler    Inhale 2 puffs into the lungs every 6 hours as needed for shortness of breath / dyspnea    Chronic obstructive pulmonary disease, unspecified COPD type (H)       aspirin 81 MG EC tablet      Take 81 mg by mouth every morning        atorvastatin 40 MG tablet    LIPITOR    90 tablet    Take 1 tablet (40 mg) by mouth At Bedtime    Hyperlipidemia LDL goal <100       BRILINTA 60 MG tablet   Generic drug:  ticagrelor      Take 60 mg by mouth daily        diclofenac 1 % Gel topical gel     VOLTAREN    100 g    Apply  2 grams to hands four times daily using enclosed dosing card.    Primary osteoarthritis of both hands       docusate sodium 100 MG tablet    COLACE    60 tablet    Take 100 mg by mouth daily    S/P bilateral salpingo-oophorectomy       escitalopram 10 MG tablet    LEXAPRO    90 tablet    Take 1 tablet (10 mg) by mouth daily due for office visit for further refills.    Moderate episode of recurrent major depressive disorder (H)       isosorbide mononitrate 30 MG 24 hr tablet    IMDUR    180 tablet    Take 2 tablets (60 mg) by mouth daily    Coronary artery disease involving native coronary artery of native heart without angina pectoris       levothyroxine 50 MCG tablet    SYNTHROID/LEVOTHROID    90 tablet    Take 1 tablet (50 mcg) by mouth daily    Hypothyroidism, unspecified type       LORazepam 0.5 MG tablet    ATIVAN    1 tablet    TAKE 1/2-1 TABLET BY MOUTH EVERY 8 HOURS AS NEEDED FOR ANXIETY. DO NOT OPERATE A VEHICLE AFTER TAKING THIS MEDICATION    Anxiety       metoprolol succinate 50 MG 24 hr tablet    TOPROL-XL    90 tablet    Take 1 tablet (50 mg) by mouth daily    Coronary artery disease involving native coronary artery of native heart without angina pectoris       nitroGLYcerin 0.4 MG sublingual tablet    NITROSTAT    25 tablet    Place 1 tablet (0.4 mg) under the tongue every 5 minutes as needed for chest pain    Coronary artery disease involving native coronary artery of native heart without angina pectoris       order for DME     1 Device    Equipment being ordered: incentive spirometry    Pneumonia of right middle lobe due to infectious organism (H)       oxyCODONE-acetaminophen 5-325 MG per tablet    PERCOCET    20 tablet    Take 1 tablet by mouth every 6 hours as needed for severe pain maximum 6 tablet(s) per day    S/P bilateral salpingo-oophorectomy       pantoprazole 40 MG EC tablet    PROTONIX    180 tablet    TAKE 1 TABLET BY MOUTH 2 TIMES DAILY. TAKE BY MOUTH 30-60  MINUTES BEFORE A MEAL    GERD (gastroesophageal reflux disease)       simethicone 80 MG chewable tablet    MYLICON    20 tablet    Take 1 tablet (80 mg) by mouth 4 times daily as needed for cramping (gas)    S/P bilateral salpingo-oophorectomy       umeclidinium-vilanterol 62.5-25 MCG/INH oral inhaler    ANORO ELLIPTA    1 Inhaler    Inhale 1 puff into the lungs daily    Chronic obstructive pulmonary disease, unspecified COPD type (H)

## 2018-05-15 NOTE — NURSING NOTE
"Oncology Rooming Note    May 15, 2018 1:23 PM   Michela Cole is a 67 year old female who presents for:    Chief Complaint   Patient presents with     Oncology Clinic Visit     Return Post Op     Initial Vitals: /67  Pulse 81  Temp 98.2  F (36.8  C) (Oral)  Resp 16  Ht 1.518 m (4' 11.75\")  Wt 60.3 kg (133 lb)  SpO2 98%  BMI 26.19 kg/m2 Estimated body mass index is 26.19 kg/(m^2) as calculated from the following:    Height as of this encounter: 1.518 m (4' 11.75\").    Weight as of this encounter: 60.3 kg (133 lb). Body surface area is 1.59 meters squared.  Mild Pain (2) Comment: surgical pain   No LMP recorded. Patient is postmenopausal.  Allergies reviewed: Yes  Medications reviewed: Yes    Medications: Medication refills not needed today.  Pharmacy name entered into [facility]:    [facility] - [place], [place] - [address] [facility] [id number] [place], [place] - [address] [facility] - [facility] [address]    Clinical concerns: post op     6 minutes for nursing intake (face to face time)     Iman Skaggs CMA              "

## 2018-05-15 NOTE — LETTER
5/15/2018       RE: Michela Cole  [address]  [place] [address]     Dear Colleague,    Thank you for referring your patient, Michela Cole, to the [facility]. Please see a copy of my visit note below.                            Consult Notes on Referred Patient    Date: 5/15/2018     The patient returns today for evaluation.    Her history is as follows:  Patient originally referred for evaluation of an incidentally noted adnexal mass.  She was admitted to [facility] from 10/22 to 10/28/15 with sepsis from UTI. During this hospitalization, she was apparantly noted to have an adnexal mass. She had a pelvic US on 10/25/15 which showed uterus at 11.9 x 2.7 x 4.4cm, ES 4mm, right ovary and left ovary not seen. Posterior to uterus a large cystic lesion measuring 9.0 x 5.3 x 7.1cm. MRI done for follow-up showed no evidence of nodularity or enhancement. CT CAP on the same day showed an 8cm cyst likely arising from right ovary.  6. Patient referred for further evaluation.      Medical history notable for graves disease, COPD (no oxygen), ITP, Cataracts, CAD(LAD stents). Her last stent was placed in Sept 2014. She had an MI in Dec 2014 which was felt to be secondary to Plavix. . Surgical history notable for tubal ligation, Splenectomy, appendectomy, c/sxn x 4. She went through menopause in her 50s, no HRT, no PMB. She is not sexually active. Lives with boyfriend of 18 yrs. Works as .      12-29-15:  Seen for initial consultation.  Recommended repeat pelvic US and  given length of time from first imaging.      1-4-16:  Pelvic US with uterus at 9.1 x 3.0 x 4.3cm, ES 3mm.  Left ovary 1.9cm, large cyst deep in pelvis measuring 8.7 x 5.4 x 7.6 with debris and possible projections.  No wall thickening.       2-8-16:  Uterus 7.0 x 4.7, ES 2.3mm, left ovary normal, pelvic cyst measuring 5.3 x 7.8 x 9.2cm      5-10-16:  Uterus 8.6 x 5cm, ES 3mm, pelvic cyst 9.5  x 7.5 x 5.1, stable      11-8-16:  Uterus 9.6 x 2.9 x 4.8, ES 4mm.  Right ovary 10 x 5.3 x 7.7.  Stable small left ovarian cyst 1.3cm. Discussed proceeding with surgery versus ongoing observation. Patient elected to proceed with observation.     6/6/17:  Uterus 7.2 x 2.9 x 4.4, ES 3mm.  Right ovary 10.9 x 5.3 x 8.3, large simple cyst relatively stable.  Simple left ovarian cyst, slightly increased to 1.7cm. Patient decided to move forward with treatment.    Surgery scheduled for 9/11/17 and then cancelled due to insurance issues.  Patient then re-established care and surgery rescheduled.    5/1/18:             Diagnostic laparoscopy, converted to exploratory laparotomy, lysis of adhesions, bilateral salpingo-oophorectomy.  Final pathology showed right ovarian mucinous cystadenoma, left ovarian mucinous adenofibroma.    The patient returns today for follow-up.  She is overall doing well. No fevers, no chills, no nausea or vomiting.  Appetite and energy good, weight stable.  No chest pain or shortness or breath.  No new pain symptoms.  No abdominal pain, no difficulty with bowel or bladder function, no constipation or diarrhea, no urinary leakage/urgency/pain.  No vaginal bleeding, is not sexually active.  No lower extremity pain or swelling.        Past Medical History:    Past Medical History:   Diagnosis Date     CAD (coronary artery disease) 09/26/2014    s/p 4 MARQUES 2014     COPD (chronic obstructive pulmonary disease) (H)      Family history of sudden cardiac death (SCD)      GERD (gastroesophageal reflux disease) 2/4/2010     H/O idiopathic thrombocytopenic purpura      History of blood transfusion 2005    x 2     History of Graves' disease 1978     History of myocardial infarction 2014     History of sepsis 2015     Hyperlipidemia LDL goal <130 9/23/2009     Hypothyroidism 9/23/2009     Major depression      PONV (postoperative nausea and vomiting)          Past Surgical History:    Past Surgical History:    Procedure Laterality Date     APPENDECTOMY       C/SECTION, LOW TRANSVERSE      x 4     CORONARY ANGIOGRAPHY ADULT ORDER      Total of 5 coronary angiograms     HC DRUG-ELUTING STENTS, SINGLE  2014     HYSTERECTOMY TOTAL ABD, JIMENA SALPINGO-OOPHORECTOMY, NODE DISSECTION, TUMOR DEBULKING, COMBINED N/A 4/30/2018    Procedure: COMBINED HYSTERECTOMY TOTAL ABDOMINAL, SALPINGO-OOPHORECTOMY, NODE DISSECTION, TUMOR DEBULKING;;  Surgeon: Melody Bolivar MD;  Location: [facility] OR     LAPAROSCOPIC HYSTERECTOMY TOTAL, JIMENA SALPINGO-OOPHORECTOMY, NODE DISSECTION, TUMOR STAGING, COMBINED N/A 4/30/2018    Procedure: COMBINED LAPAROSCOPIC HYSTERECTOMY TOTAL, SALPINGO-OOPHORECTOMY, NODE DISSECTION, TUMOR STAGING;  Diagnostic Laparoscopy, Laparotomy, Extensive Lysis of Adhesions, Open Bilateral Salpingo-Oophorectomy, Partial Omentectomy, Anesthesia Block;  Surgeon: Melody Bolivar MD;  Location: [facility] OR     SPLENECTOMY  1978    ITP     TUBAL LIGATION      x2         Health Maintenance:  Health Maintenance Due   Topic Date Due     HIV SCREEN (SYSTEM ASSIGNED)  09/27/1970     EYE EXAM Q1 YEAR  08/06/2016     ADVANCE DIRECTIVE PLANNING Q5 YRS  09/19/2016     COPD ACTION PLAN Q1 YR  07/07/2017     DEXA SCAN SCREENING (SYSTEM ASSIGNED)  09/27/2017     FIT Q1 YR  04/04/2018         Current Medications:     has a current medication list which includes the following prescription(s): albuterol, aspirin, atorvastatin, brilinta, diclofenac, docusate sodium, escitalopram, isosorbide mononitrate, levothyroxine, lorazepam, metoprolol succinate, order for dme, pantoprazole, simethicone, umeclidinium-vilanterol, bupropion, nitroglycerin, and oxycodone-acetaminophen.       Allergies:     [unfilled]        Social History:     Social History   Substance Use Topics     Smoking status: Former Smoker     Packs/day: 0.25     Years: 42.00     Types: Cigarettes     Quit date: 6/24/2013     Smokeless tobacco: Never Used     Alcohol use 3.6 oz/week     0 Standard  "drinks or equivalent, 6 Cans of beer per week      Comment: A couple times a week, \"three beers max\"       History   Drug Use No           Family History:     The patient's family history is notable for :    Family History   Problem Relation Age of Onset     Cervical Cancer Mother      Lymph nodes were also involved, no chemotherapy needed.       DIABETES Father      Hyperlipidemia Father      HEART DISEASE Maternal Grandfather      HEART DISEASE Paternal Grandmother      HEART DISEASE Paternal Grandfather      Glaucoma No family hx of      Macular Degeneration No family hx of      Hypertension No family hx of      CEREBROVASCULAR DISEASE No family hx of      Thyroid Disease No family hx of          Physical Exam:     /67  Pulse 81  Temp 98.2  F (36.8  C) (Oral)  Resp 16  Ht 1.518 m (4' 11.75\")  Wt 60.3 kg (133 lb)  SpO2 98%  BMI 26.19 kg/m2  Body mass index is 26.19 kg/(m^2).    General Appearance: healthy and alert, no distress     Musculoskeletal: extremities non tender and without edema    Skin: no lesions or rashes     Neurological: normal gait, no gross defects     Psychiatric: appropriate mood and affect                               Hematological: normal cervical, supraclavicular and inguinal lymph nodes     Gastrointestinal:       abdomen soft, non-tender, non-distended, no organomegaly or masses.  Incisions well healed    Assessment:    Michela Cole is a 67 year old woman status post excision of bilateral benign ovarian masses    A total of 30 minutes was spent with the patient, 25 minutes of which were spent in counseling the patient and/or treatment planning.      Plan:     1.)    Right ovarian mucinous cystadenoma, left ovarian mucinous adenofibroma:  Final pathology reviewed in detail with patient and copy of report given. Benign findings, no further treatment needed.    She is recovering well from surgery. Continue surgical restrictions for six weeks after surgery with no heavy " lifting >20lbs.    No further Gyn Onc care needed.     Melody Bolivar MD  Gynecologic Oncology  [facility]      CC  Patient Care Team:  Frannie Allen MD as PCP - General (Internal Medicine)  Melody Bolivar MD as Physician (Oncology)  FRANNIE ALLEN

## 2018-06-11 NOTE — PROGRESS NOTES
Consult Notes on Referred Patient    Date: 5/15/2018     The patient returns today for evaluation.    Her history is as follows:  Patient originally referred for evaluation of an incidentally noted adnexal mass.  She was admitted to [facility] from 10/22 to 10/28/15 with sepsis from UTI. During this hospitalization, she was apparantly noted to have an adnexal mass. She had a pelvic US on 10/25/15 which showed uterus at 11.9 x 2.7 x 4.4cm, ES 4mm, right ovary and left ovary not seen. Posterior to uterus a large cystic lesion measuring 9.0 x 5.3 x 7.1cm. MRI done for follow-up showed no evidence of nodularity or enhancement. CT CAP on the same day showed an 8cm cyst likely arising from right ovary.  6. Patient referred for further evaluation.      Medical history notable for graves disease, COPD (no oxygen), ITP, Cataracts, CAD(LAD stents). Her last stent was placed in Sept 2014. She had an MI in Dec 2014 which was felt to be secondary to Plavix. . Surgical history notable for tubal ligation, Splenectomy, appendectomy, c/sxn x 4. She went through menopause in her 50s, no HRT, no PMB. She is not sexually active. Lives with boyfriend of 18 yrs. Works as .      12-29-15:  Seen for initial consultation.  Recommended repeat pelvic US and  given length of time from first imaging.      1-4-16:  Pelvic US with uterus at 9.1 x 3.0 x 4.3cm, ES 3mm.  Left ovary 1.9cm, large cyst deep in pelvis measuring 8.7 x 5.4 x 7.6 with debris and possible projections.  No wall thickening.       2-8-16:  Uterus 7.0 x 4.7, ES 2.3mm, left ovary normal, pelvic cyst measuring 5.3 x 7.8 x 9.2cm      5-10-16:  Uterus 8.6 x 5cm, ES 3mm, pelvic cyst 9.5 x 7.5 x 5.1, stable      11-8-16:  Uterus 9.6 x 2.9 x 4.8, ES 4mm.  Right ovary 10 x 5.3 x 7.7.  Stable small left ovarian cyst 1.3cm. Discussed proceeding with surgery versus ongoing observation. Patient elected to proceed with  observation.     6/6/17:  Uterus 7.2 x 2.9 x 4.4, ES 3mm.  Right ovary 10.9 x 5.3 x 8.3, large simple cyst relatively stable.  Simple left ovarian cyst, slightly increased to 1.7cm. Patient decided to move forward with treatment.    Surgery scheduled for 9/11/17 and then cancelled due to insurance issues.  Patient then re-established care and surgery rescheduled.    5/1/18:             Diagnostic laparoscopy, converted to exploratory laparotomy, lysis of adhesions, bilateral salpingo-oophorectomy.  Final pathology showed right ovarian mucinous cystadenoma, left ovarian mucinous adenofibroma.    The patient returns today for follow-up.  She is overall doing well. No fevers, no chills, no nausea or vomiting.  Appetite and energy good, weight stable.  No chest pain or shortness or breath.  No new pain symptoms.  No abdominal pain, no difficulty with bowel or bladder function, no constipation or diarrhea, no urinary leakage/urgency/pain.  No vaginal bleeding, is not sexually active.  No lower extremity pain or swelling.        Past Medical History:    Past Medical History:   Diagnosis Date     CAD (coronary artery disease) 09/26/2014    s/p 4 MARQUES 2014     COPD (chronic obstructive pulmonary disease) (H)      Family history of sudden cardiac death (SCD)      GERD (gastroesophageal reflux disease) 2/4/2010     H/O idiopathic thrombocytopenic purpura      History of blood transfusion 2005    x 2     History of Graves' disease 1978     History of myocardial infarction 2014     History of sepsis 2015     Hyperlipidemia LDL goal <130 9/23/2009     Hypothyroidism 9/23/2009     Major depression      PONV (postoperative nausea and vomiting)          Past Surgical History:    Past Surgical History:   Procedure Laterality Date     APPENDECTOMY       C/SECTION, LOW TRANSVERSE      x 4     CORONARY ANGIOGRAPHY ADULT ORDER      Total of 5 coronary angiograms     HC DRUG-ELUTING STENTS, SINGLE  2014     HYSTERECTOMY TOTAL ABD, JIMENA  "SALPINGO-OOPHORECTOMY, NODE DISSECTION, TUMOR DEBULKING, COMBINED N/A 4/30/2018    Procedure: COMBINED HYSTERECTOMY TOTAL ABDOMINAL, SALPINGO-OOPHORECTOMY, NODE DISSECTION, TUMOR DEBULKING;;  Surgeon: Melody Bolivar MD;  Location: [facility] OR     LAPAROSCOPIC HYSTERECTOMY TOTAL, JIMENA SALPINGO-OOPHORECTOMY, NODE DISSECTION, TUMOR STAGING, COMBINED N/A 4/30/2018    Procedure: COMBINED LAPAROSCOPIC HYSTERECTOMY TOTAL, SALPINGO-OOPHORECTOMY, NODE DISSECTION, TUMOR STAGING;  Diagnostic Laparoscopy, Laparotomy, Extensive Lysis of Adhesions, Open Bilateral Salpingo-Oophorectomy, Partial Omentectomy, Anesthesia Block;  Surgeon: Melody Bolivar MD;  Location: [facility] OR     SPLENECTOMY  1978    ITP     TUBAL LIGATION      x2         Health Maintenance:  Health Maintenance Due   Topic Date Due     HIV SCREEN (SYSTEM ASSIGNED)  09/27/1970     EYE EXAM Q1 YEAR  08/06/2016     ADVANCE DIRECTIVE PLANNING Q5 YRS  09/19/2016     COPD ACTION PLAN Q1 YR  07/07/2017     DEXA SCAN SCREENING (SYSTEM ASSIGNED)  09/27/2017     FIT Q1 YR  04/04/2018         Current Medications:     has a current medication list which includes the following prescription(s): albuterol, aspirin, atorvastatin, brilinta, diclofenac, docusate sodium, escitalopram, isosorbide mononitrate, levothyroxine, lorazepam, metoprolol succinate, order for dme, pantoprazole, simethicone, umeclidinium-vilanterol, bupropion, nitroglycerin, and oxycodone-acetaminophen.       Allergies:     [unfilled]        Social History:     Social History   Substance Use Topics     Smoking status: Former Smoker     Packs/day: 0.25     Years: 42.00     Types: Cigarettes     Quit date: 6/24/2013     Smokeless tobacco: Never Used     Alcohol use 3.6 oz/week     0 Standard drinks or equivalent, 6 Cans of beer per week      Comment: A couple times a week, \"three beers max\"       History   Drug Use No           Family History:     The patient's family history is notable for :    Family History   Problem " "Relation Age of Onset     Cervical Cancer Mother      Lymph nodes were also involved, no chemotherapy needed.       DIABETES Father      Hyperlipidemia Father      HEART DISEASE Maternal Grandfather      HEART DISEASE Paternal Grandmother      HEART DISEASE Paternal Grandfather      Glaucoma No family hx of      Macular Degeneration No family hx of      Hypertension No family hx of      CEREBROVASCULAR DISEASE No family hx of      Thyroid Disease No family hx of          Physical Exam:     /67  Pulse 81  Temp 98.2  F (36.8  C) (Oral)  Resp 16  Ht 1.518 m (4' 11.75\")  Wt 60.3 kg (133 lb)  SpO2 98%  BMI 26.19 kg/m2  Body mass index is 26.19 kg/(m^2).    General Appearance: healthy and alert, no distress     Musculoskeletal: extremities non tender and without edema    Skin: no lesions or rashes     Neurological: normal gait, no gross defects     Psychiatric: appropriate mood and affect                               Hematological: normal cervical, supraclavicular and inguinal lymph nodes     Gastrointestinal:       abdomen soft, non-tender, non-distended, no organomegaly or masses.  Incisions well healed    Assessment:    Michela Cole is a 67 year old woman status post excision of bilateral benign ovarian masses    A total of 30 minutes was spent with the patient, 25 minutes of which were spent in counseling the patient and/or treatment planning.      Plan:     1.)    Right ovarian mucinous cystadenoma, left ovarian mucinous adenofibroma:  Final pathology reviewed in detail with patient and copy of report given. Benign findings, no further treatment needed.    She is recovering well from surgery. Continue surgical restrictions for six weeks after surgery with no heavy lifting >20lbs.    No further Gyn Onc care needed.     Melody Bolivar MD  Gynecologic Oncology  [facility]      CC  Patient Care Team:  Frannie Allen MD as PCP - General (Internal Medicine)  Melody Bolivar, " MD as Physician (Oncology)  MARISA ZUNIGA

## 2018-08-01 ENCOUNTER — RADIANT APPOINTMENT (OUTPATIENT)
Dept: GENERAL RADIOLOGY | Facility: CLINIC | Age: 68
End: 2018-08-01
Attending: NURSE PRACTITIONER
Payer: COMMERCIAL

## 2018-08-01 ENCOUNTER — OFFICE VISIT (OUTPATIENT)
Dept: FAMILY MEDICINE | Facility: CLINIC | Age: 68
End: 2018-08-01
Payer: COMMERCIAL

## 2018-08-01 VITALS
SYSTOLIC BLOOD PRESSURE: 122 MMHG | TEMPERATURE: 98.2 F | DIASTOLIC BLOOD PRESSURE: 80 MMHG | HEIGHT: 60 IN | BODY MASS INDEX: 26.23 KG/M2 | HEART RATE: 96 BPM | RESPIRATION RATE: 16 BRPM | WEIGHT: 133.6 LBS | OXYGEN SATURATION: 96 %

## 2018-08-01 DIAGNOSIS — R07.81 RIB PAIN ON RIGHT SIDE: Primary | ICD-10-CM

## 2018-08-01 DIAGNOSIS — E03.9 HYPOTHYROIDISM, UNSPECIFIED TYPE: ICD-10-CM

## 2018-08-01 DIAGNOSIS — R07.81 RIB PAIN ON RIGHT SIDE: ICD-10-CM

## 2018-08-01 DIAGNOSIS — F41.9 ANXIETY: ICD-10-CM

## 2018-08-01 DIAGNOSIS — F32.1 MODERATE MAJOR DEPRESSION (H): ICD-10-CM

## 2018-08-01 LAB — TSH SERPL DL<=0.005 MIU/L-ACNC: 2.22 MU/L (ref 0.4–4)

## 2018-08-01 PROCEDURE — 99214 OFFICE O/P EST MOD 30 MIN: CPT | Performed by: NURSE PRACTITIONER

## 2018-08-01 PROCEDURE — 71101 X-RAY EXAM UNILAT RIBS/CHEST: CPT | Mod: RT

## 2018-08-01 PROCEDURE — 84443 ASSAY THYROID STIM HORMONE: CPT | Performed by: NURSE PRACTITIONER

## 2018-08-01 PROCEDURE — 36415 COLL VENOUS BLD VENIPUNCTURE: CPT | Performed by: NURSE PRACTITIONER

## 2018-08-01 RX ORDER — SERTRALINE HYDROCHLORIDE 25 MG/1
25 TABLET, FILM COATED ORAL DAILY
Qty: 30 TABLET | Refills: 0 | Status: SHIPPED | OUTPATIENT
Start: 2018-08-01 | End: 2018-12-03

## 2018-08-01 RX ORDER — LEVOTHYROXINE SODIUM 50 UG/1
50 TABLET ORAL DAILY
Qty: 90 TABLET | Refills: 3 | Status: SHIPPED | OUTPATIENT
Start: 2018-08-01 | End: 2019-07-25

## 2018-08-01 RX ORDER — OXYCODONE AND ACETAMINOPHEN 5; 325 MG/1; MG/1
1 TABLET ORAL EVERY 6 HOURS PRN
Qty: 15 TABLET | Refills: 0 | Status: SHIPPED | OUTPATIENT
Start: 2018-08-01 | End: 2019-10-21

## 2018-08-01 ASSESSMENT — PAIN SCALES - GENERAL: PAINLEVEL: EXTREME PAIN (8)

## 2018-08-01 NOTE — LETTER
[facility]  [address] [place] [address]    August 2, 2018    Michela MERCADO Paci[facility]imothy  [address]  [place] [address]          Dear Michela,    Normal thyroid.     If you have any questions please feel free to contact [phone] or myself via [employer]    Enclosed is a copy of your results.     Results for orders placed or performed in visit on 08/01/18   **TSH with free T4 reflex FUTURE 2mo   Result Value Ref Range    TSH 2.22 0.40 - 4.00 mU/L       If you have any questions or concerns, please call myself or my nurse at [phone].      Sincerely,        Swapna Edwards, GOLDIE/barrett

## 2018-08-01 NOTE — PROGRESS NOTES
Dear Michela,    Your recent test results are attached.      No rib fractures noted.    If you have any questions please feel free to contact [phone] or myself via [employer].    Sincerely,  Swapna Edwards, CNP

## 2018-08-01 NOTE — PROGRESS NOTES
Dear Michela,    Your recent test results are attached.      Normal thyroid.    If you have any questions please feel free to contact [phone] or myself via [employer].    Sincerely,  Swapna Edwards, CNP

## 2018-08-01 NOTE — PROGRESS NOTES
SUBJECTIVE:   Michela Cole is a 67 year old female who presents to clinic today for the following health issues:        Joint Pain    Onset: thursday    Description:   Location: underneath the rt breast/ ribs  Character: Sharp    Intensity: severe    Progression of Symptoms: worse    Accompanying Signs & Symptoms:  Other symptoms: radiation of pain to the back    History:   Previous similar pain: no       Precipitating factors:   Trauma or overuse: YES- fall    Alleviating factors:  Improved by: nothing    Therapies Tried and outcome: no    Patient tripped over a dog and fell nearly a week ago.  She now notes pain to her right ribs.  She notes pain with deep breathing.  Patient denies cough, shortness of breath.  Patient denies any additional pain.  Pain occurs with laying down, really any movements.  Patient has tried aleve with minimal improvement.    Patient notes she stopped lexapro due to sexual side effects and notes that her anxiety and depression have worsened.  She continues on wellbutrin.  She notes being more tearful and on edge.  She would like to start something new.  Patient would not elaborate further on her symptoms in front of her granddaughter.    Problem list and histories reviewed & adjusted, as indicated.  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     Stress     Restless legs syndrome (RLS)     CAD (coronary artery disease)     Moderate episode of recurrent major depressive disorder (H)     S/P BSO (bilateral salpingo-oophorectomy)     Past Surgical History:   Procedure Laterality Date     APPENDECTOMY       C/SECTION, LOW TRANSVERSE      x 4     CORONARY ANGIOGRAPHY ADULT  "ORDER      Total of 5 coronary angiograms     HC DRUG-ELUTING STENTS, SINGLE  2014     HYSTERECTOMY TOTAL ABD, JIMENA SALPINGO-OOPHORECTOMY, NODE DISSECTION, TUMOR DEBULKING, COMBINED N/A 4/30/2018    Procedure: COMBINED HYSTERECTOMY TOTAL ABDOMINAL, SALPINGO-OOPHORECTOMY, NODE DISSECTION, TUMOR DEBULKING;;  Surgeon: Melody Bolivar MD;  Location: [facility] OR     LAPAROSCOPIC HYSTERECTOMY TOTAL, JIMENA SALPINGO-OOPHORECTOMY, NODE DISSECTION, TUMOR STAGING, COMBINED N/A 4/30/2018    Procedure: COMBINED LAPAROSCOPIC HYSTERECTOMY TOTAL, SALPINGO-OOPHORECTOMY, NODE DISSECTION, TUMOR STAGING;  Diagnostic Laparoscopy, Laparotomy, Extensive Lysis of Adhesions, Open Bilateral Salpingo-Oophorectomy, Partial Omentectomy, Anesthesia Block;  Surgeon: Melody Bolivar MD;  Location: [facility] OR     SPLENECTOMY  1978    ITP     TUBAL LIGATION      x2       Social History   Substance Use Topics     Smoking status: Former Smoker     Packs/day: 0.25     Years: 42.00     Types: Cigarettes     Quit date: 6/24/2013     Smokeless tobacco: Never Used     Alcohol use 3.6 oz/week     0 Standard drinks or equivalent, 6 Cans of beer per week      Comment: A couple times a week, \"three beers max\"     Family History   Problem Relation Age of Onset     Cervical Cancer Mother      Lymph nodes were also involved, no chemotherapy needed.       Diabetes Father      Hyperlipidemia Father      HEART DISEASE Maternal Grandfather      HEART DISEASE Paternal Grandmother      HEART DISEASE Paternal Grandfather      Glaucoma No family hx of      Macular Degeneration No family hx of      Hypertension No family hx of      Cerebrovascular Disease No family hx of      Thyroid Disease No family hx of          Current Outpatient Prescriptions   Medication Sig Dispense Refill     albuterol (PROAIR HFA/PROVENTIL HFA/VENTOLIN HFA) 108 (90 BASE) MCG/ACT Inhaler Inhale 2 puffs into the lungs every 6 hours as needed for shortness of breath / dyspnea 2 Inhaler 2     aspirin EC 81 MG " tablet Take 81 mg by mouth every morning        atorvastatin (LIPITOR) 40 MG tablet Take 1 tablet (40 mg) by mouth At Bedtime 90 tablet 1     BRILINTA 60 MG tablet Take 60 mg by mouth daily  11     buPROPion (WELLBUTRIN SR) 200 MG 12 hr tablet TAKE ONE TABLET BY MOUTH TWICE DAILY. 180 tablet 0     diclofenac (VOLTAREN) 1 % GEL topical gel Apply  2 grams to hands four times daily using enclosed dosing card. 100 g 0     docusate sodium (COLACE) 100 MG tablet Take 100 mg by mouth daily 60 tablet 0     isosorbide mononitrate (IMDUR) 30 MG 24 hr tablet Take 2 tablets (60 mg) by mouth daily 180 tablet 1     levothyroxine (SYNTHROID/LEVOTHROID) 50 MCG tablet Take 1 tablet (50 mcg) by mouth daily 90 tablet 3     LORazepam (ATIVAN) 0.5 MG tablet TAKE 1/2-1 TABLET BY MOUTH EVERY 8 HOURS AS NEEDED FOR ANXIETY. DO NOT OPERATE A VEHICLE AFTER TAKING THIS MEDICATION 1 tablet 0     metoprolol (TOPROL-XL) 50 MG 24 hr tablet Take 1 tablet (50 mg) by mouth daily (Patient taking differently: Take 50 mg by mouth every morning ) 90 tablet 3     nitroglycerin (NITROSTAT) 0.4 MG sublingual tablet Place 1 tablet (0.4 mg) under the tongue every 5 minutes as needed for chest pain 25 tablet 11     order for DME Equipment being ordered: incentive spirometry 1 Device 0     oxyCODONE-acetaminophen (PERCOCET) 5-325 MG per tablet Take 1 tablet by mouth every 6 hours as needed for pain 15 tablet 0     pantoprazole (PROTONIX) 40 MG EC tablet TAKE 1 TABLET BY MOUTH 2 TIMES DAILY. TAKE BY MOUTH 30-60 MINUTES BEFORE A MEAL 180 tablet 2     sertraline (ZOLOFT) 25 MG tablet Take 1 tablet (25 mg) by mouth daily 30 tablet 0     simethicone (MYLICON) 80 MG chewable tablet Take 1 tablet (80 mg) by mouth 4 times daily as needed for cramping (gas) 20 tablet 0     umeclidinium-vilanterol (ANORO ELLIPTA) 62.5-25 MCG/INH oral inhaler Inhale 1 puff into the lungs daily 1 Inhaler 5     [DISCONTINUED] levothyroxine (SYNTHROID/LEVOTHROID) 50 MCG tablet Take 1 tablet  "(50 mcg) by mouth daily Need labs drawn for further refills 30 tablet 0     Allergies   Allergen Reactions     Sulfa Drugs Rash and Hives     Plavix [Clopidogrel] Other (See Comments)     Other reaction(s): Other - Describe In Comment Field  Not effective at preventing clots  P2Y12 testing done at [facility] in 12/2014 indicates a non responder to Plavix     Sulfasalazine Rash     BP Readings from Last 3 Encounters:   08/01/18 122/80   05/15/18 113/67   05/05/18 130/84    Wt Readings from Last 3 Encounters:   08/01/18 133 lb 9.6 oz (60.6 kg)   05/15/18 133 lb (60.3 kg)   05/05/18 135 lb 12.8 oz (61.6 kg)                  Labs reviewed in EPIC    Reviewed and updated as needed this visit by clinical staff       Reviewed and updated as needed this visit by Provider         ROS:  Constitutional, HEENT, cardiovascular, pulmonary, gi and gu systems are negative, except as otherwise noted.    OBJECTIVE:     /80  Pulse 96  Temp 98.2  F (36.8  C) (Oral)  Resp 16  Ht 4' 11.75\" (1.518 m)  Wt 133 lb 9.6 oz (60.6 kg)  SpO2 96%  BMI 26.31 kg/m2  Body mass index is 26.31 kg/(m^2).  GENERAL: healthy, alert and no distress  RESP: lungs clear to auscultation - no rales, rhonchi or wheezes  CV: regular rate and rhythm, normal S1 S2, no S3 or S4, no murmur, click or rub, no peripheral edema and peripheral pulses strong  MS: Tenderness to palpation of right posterior and lateral ribs  PSYCH: mentation appears normal, affect normal/bright    Diagnostic Test Results:  none     ASSESSMENT/PLAN:     1. Rib pain on right side  Differential includes contusion, fracture.  Patient to avoid lifting, pushing, pulling greater than 20 lbs until pain resolves.  I instructed in guarding with sneezing, coughing.  - oxyCODONE-acetaminophen (PERCOCET) 5-325 MG per tablet; Take 1 tablet by mouth every 6 hours as needed for pain  Dispense: 15 tablet; Refill: 0  - XR Ribs & Chest Right G/E 3 Views; Future    2. Hypothyroidism, unspecified " type    - levothyroxine (SYNTHROID/LEVOTHROID) 50 MCG tablet; Take 1 tablet (50 mcg) by mouth daily  Dispense: 90 tablet; Refill: 3    3. Moderate major depression (H)    - sertraline (ZOLOFT) 25 MG tablet; Take 1 tablet (25 mg) by mouth daily  Dispense: 30 tablet; Refill: 0    4. Anxiety    - sertraline (ZOLOFT) 25 MG tablet; Take 1 tablet (25 mg) by mouth daily  Dispense: 30 tablet; Refill: 0    FUTURE APPOINTMENTS:       - Follow-up visit in 2 weeks.    JOSE [facility]

## 2018-08-01 NOTE — PATIENT INSTRUCTIONS
Start sertraline 25 mg daily.  Continue wellbutrin.    [facility]    If you have any questions regarding to your visit please contact your care team:     Team Pink:   Clinic Hours Telephone Number   Internal Medicine:  Dr. Frannie Edwards, NP       7am-7pm  Monday - Thursday   7am-5pm  Fridays  [phone]  (Appointment scheduling available 24/7)    Questions about your recent visit?  Team Line  [phone]   Urgent [facility] - [place] - 11am-9pm Monday-Friday Saturday-Sunday- 9am-5pm   [place] - 5pm-9pm Monday-Friday Saturday-Sunday- 9am-5pm  [phone] - Kathy Doll  [phone] - [place]       What options do I have for a visit other than an office visit? We offer electronic visits (e-visits) and telephone visits, when medically appropriate.  Please check with your medical insurance to see if these types of visits are covered, as you will be responsible for any charges that are not paid by your insurance.      You can use [facility] (secure electronic communication) to access to your chart, send your primary care provider a message, or make an appointment. Ask a team member how to get started.     For a price quote for your services, please call our Consumer Price Line at [phone] or our Imaging Cost estimation line at [phone] (for imaging tests).  Valentine Phillips CMA

## 2018-08-01 NOTE — MR AVS SNAPSHOT
After Visit Summary   8/1/2018    Michela Cole    MRN: [id number]           Patient Information     Date Of Birth          1950        Visit Information        Provider Department      8/1/2018 11:20 AM Swapna Edwards APRN [facility]        Today's Diagnoses     Rib pain on right side    -  1    Hypothyroidism, unspecified type        Moderate major depression (H)        Anxiety          Care Instructions    Start sertraline 25 mg daily.  Continue wellbutrin.    [facility]    If you have any questions regarding to your visit please contact your care team:     Team Pink:   Clinic Hours Telephone Number   Internal Medicine:  Dr. Frannie Edwards, NP       7am-7pm  Monday - Thursday   7am-5pm  Fridays  [phone]  (Appointment scheduling available 24/7)    Questions about your recent visit?  Team Line  [phone]   Urgent Care - [place] [facility] - 11am-9pm Monday-Friday Saturday-Sunday- 9am-5pm   [place] - 5pm-9pm Monday-Friday Saturday-Sunday- 9am-5pm  [phone] - Kathy Doll  [phone] - [place]       What options do I have for a visit other than an office visit? We offer electronic visits (e-visits) and telephone visits, when medically appropriate.  Please check with your medical insurance to see if these types of visits are covered, as you will be responsible for any charges that are not paid by your insurance.      You can use Zingku (secure electronic communication) to access to your chart, send your primary care provider a message, or make an appointment. Ask a team member how to get started.     For a price quote for your services, please call our Consumer Price Line at [phone] or our Imaging Cost estimation line at [phone] (for imaging tests).  [facility]             Follow-ups after your visit        Future tests that were ordered for you today      "Open Future Orders        Priority Expected Expires Ordered    XR Ribs & Chest Right G/E 3 Views Routine 8/1/2018 8/1/2019 8/1/2018            Who to contact     If you have questions or need follow up information about today's clinic visit or your schedule please contact [facility] FRIOL[facility] directly at [phone].  Normal or non-critical lab and imaging results will be communicated to you by MyChart, letter or phone within 4 business days after the clinic has received the results. If you do not hear from us within 7 days, please contact the clinic through MyChart or phone. If you have a critical or abnormal lab result, we will notify you by phone as soon as possible.  Submit refill requests through ZOOM TV or call your pharmacy and they will forward the refill request to us. Please allow 3 business days for your refill to be completed.          Additional Information About Your Visit        Care EveryWhere ID     This is your Care EveryWhere ID. This could be used by other organizations to access your [place] medical records  [id number]        Your Vitals Were     Pulse Temperature Respirations Height Pulse Oximetry BMI (Body Mass Index)    96 98.2  F (36.8  C) (Oral) 16 4' 11.75\" (1.518 m) 96% 26.31 kg/m2       Blood Pressure from Last 3 Encounters:   08/01/18 122/80   05/15/18 113/67   05/05/18 130/84    Weight from Last 3 Encounters:   08/01/18 133 lb 9.6 oz (60.6 kg)   05/15/18 133 lb (60.3 kg)   05/05/18 135 lb 12.8 oz (61.6 kg)                 Today's Medication Changes          These changes are accurate as of 8/1/18 11:58 AM.  If you have any questions, ask your nurse or doctor.               Start taking these medicines.        Dose/Directions    oxyCODONE-acetaminophen 5-325 MG per tablet   Commonly known as:  PERCOCET   Used for:  Rib pain on right side   Started by:  Swapna Edwards APRN CNP        Dose:  1 tablet   Take 1 tablet by mouth every 6 hours as needed for pain   Quantity:  " 15 tablet   Refills:  0       sertraline 25 MG tablet   Commonly known as:  ZOLOFT   Used for:  Moderate major depression (H), Anxiety   Started by:  Swapna Edwards APRN CNP        Dose:  25 mg   Take 1 tablet (25 mg) by mouth daily   Quantity:  30 tablet   Refills:  0         These medicines have changed or have updated prescriptions.        Dose/Directions    levothyroxine 50 MCG tablet   Commonly known as:  SYNTHROID/LEVOTHROID   This may have changed:  additional instructions   Used for:  Hypothyroidism, unspecified type   Changed by:  Swapna Edwards APRN CNP        Dose:  50 mcg   Take 1 tablet (50 mcg) by mouth daily   Quantity:  90 tablet   Refills:  3       metoprolol succinate 50 MG 24 hr tablet   Commonly known as:  TOPROL-XL   This may have changed:  when to take this   Used for:  Coronary artery disease involving native coronary artery of native heart without angina pectoris        Dose:  50 mg   Take 1 tablet (50 mg) by mouth daily   Quantity:  90 tablet   Refills:  3            Where to get your medicines      These medications were sent to [id number] - [place], [place] - [address]  [address] [place] [address]     Phone:  [phone]     levothyroxine 50 MCG tablet    sertraline 25 MG tablet         Some of these will need a paper prescription and others can be bought over the counter.  Ask your nurse if you have questions.     Bring a paper prescription for each of these medications     oxyCODONE-acetaminophen 5-325 MG per tablet               Information about OPIOIDS     PRESCRIPTION OPIOIDS: WHAT YOU NEED TO KNOW   We gave you an opioid (narcotic) pain medicine. It is important to manage your pain, but opioids are not always the best choice. You should first try all the other options your care team gave you. Take this medicine for as short a time (and as few doses) as possible.     These medicines have risks:    DO NOT drive when on new or higher doses of  pain medicine. These medicines can affect your alertness and reaction times, and you could be arrested for driving under the influence (DUI). If you need to use opioids long-term, talk to your care team about driving.    DO NOT operate heave machinery    DO NOT do any other dangerous activities while taking these medicines.     DO NOT drink any alcohol while taking these medicines.      If the opioid prescribed includes acetaminophen, DO NOT take with any other medicines that contain acetaminophen. Read all labels carefully. Look for the word  acetaminophen  or  Tylenol.  Ask your pharmacist if you have questions or are unsure.    You can get addicted to pain medicines, especially if you have a history of addiction (chemical, alcohol or substance dependence). Talk to your care team about ways to reduce this risk.    Store your pills in a secure place, locked if possible. We will not replace any lost or stolen medicine. If you don t finish your medicine, please throw away (dispose) as directed by your pharmacist. The [place] Pollution [employer] has more information about safe disposal: https://www.pca.[facility].[place].us/living-green/managing-unwanted-medications.     All opioids tend to cause constipation. Drink plenty of water and eat foods that have a lot of fiber, such as fruits, vegetables, prune juice, apple juice and high-fiber cereal. Take a laxative (Miralax, milk of magnesia, Colace, Senna) if you don t move your bowels at least every other day.          Primary Care Provider Office Phone # Fax #    Frannie Allen [phone]       [address] [address]        Goals        General    Functional (pt-stated)     Notes - Note created  9/1/2015 11:20 AM by Janneth Orellana, RN    I will rinse my mouth out after using my inhaler to help keep the lining of my mouth healthy  As of today's date 9/1/2015 goal is met at 0 - 25%.   Goal Status:  Ongoing      Medication (pt-stated)      Notes - Note edited  9/1/2015  2:10 PM by Janneth Orellana RN    I will use my inhalers as instructed  As of today's date 9/1/2015 goal is met at 0 - 25%.   Goal Status:  Activeedule  Appointment with ENT       Psychosocial (pt-stated)     Notes - Note edited  9/1/2015  2:10 PM by Janneth Orellana RN    I will  Investigate counseling and/or support groups if and when I am ready to get formal support  As of today's date 9/1/2015 goal is met at 0 - 25%.   Goal Status:  Ongoing        Equal Access to Services     [facility]: Hadii marya escobar hadashbalwinder Sojohn, waaxda luqadaha, qaybta kaalmada glenny, charline leblanc . So [facility] [phone].    ATENCIÓN: Si habla español, tiene a de la rosa disposición servicios gratuitos de asistencia lingüística. Llame al [phone].    We comply with applicable federal civil rights laws and [place] laws. We do not discriminate on the basis of race, color, national origin, age, disability, sex, sexual orientation, or gender identity.            Thank you!     Thank you for choosing [facility]  for your care. Our goal is always to provide you with excellent care. Hearing back from our patients is one way we can continue to improve our services. Please take a few minutes to complete the written survey that you may receive in the mail after your visit with us. Thank you!             Your Updated Medication List - Protect others around you: Learn how to safely use, store and throw away your medicines at www.disposemymeds.org.          This list is accurate as of 8/1/18 11:58 AM.  Always use your most recent med list.                   Brand Name Dispense Instructions for use Diagnosis    albuterol 108 (90 Base) MCG/ACT Inhaler    PROAIR HFA/PROVENTIL HFA/VENTOLIN HFA    2 Inhaler    Inhale 2 puffs into the lungs every 6 hours as needed for shortness of breath / dyspnea    Chronic obstructive pulmonary disease, unspecified COPD type (H)       aspirin  81 MG EC tablet      Take 81 mg by mouth every morning        atorvastatin 40 MG tablet    LIPITOR    90 tablet    Take 1 tablet (40 mg) by mouth At Bedtime    Hyperlipidemia LDL goal <100       BRILINTA 60 MG tablet   Generic drug:  ticagrelor      Take 60 mg by mouth daily        buPROPion 200 MG 12 hr tablet    WELLBUTRIN SR    180 tablet    TAKE ONE TABLET BY MOUTH TWICE DAILY.    Moderate episode of recurrent major depressive disorder (H)       diclofenac 1 % Gel topical gel    VOLTAREN    100 g    Apply  2 grams to hands four times daily using enclosed dosing card.    Primary osteoarthritis of both hands       docusate sodium 100 MG tablet    COLACE    60 tablet    Take 100 mg by mouth daily    S/P bilateral salpingo-oophorectomy       isosorbide mononitrate 30 MG 24 hr tablet    IMDUR    180 tablet    Take 2 tablets (60 mg) by mouth daily    Coronary artery disease involving native coronary artery of native heart without angina pectoris       levothyroxine 50 MCG tablet    SYNTHROID/LEVOTHROID    90 tablet    Take 1 tablet (50 mcg) by mouth daily    Hypothyroidism, unspecified type       LORazepam 0.5 MG tablet    ATIVAN    1 tablet    TAKE 1/2-1 TABLET BY MOUTH EVERY 8 HOURS AS NEEDED FOR ANXIETY. DO NOT OPERATE A VEHICLE AFTER TAKING THIS MEDICATION    Anxiety       metoprolol succinate 50 MG 24 hr tablet    TOPROL-XL    90 tablet    Take 1 tablet (50 mg) by mouth daily    Coronary artery disease involving native coronary artery of native heart without angina pectoris       nitroGLYcerin 0.4 MG sublingual tablet    NITROSTAT    25 tablet    Place 1 tablet (0.4 mg) under the tongue every 5 minutes as needed for chest pain    Coronary artery disease involving native coronary artery of native heart without angina pectoris       order for DME     1 Device    Equipment being ordered: incentive spirometry    Pneumonia of right middle lobe due to infectious organism (H)       oxyCODONE-acetaminophen 5-325 MG per  tablet    PERCOCET    15 tablet    Take 1 tablet by mouth every 6 hours as needed for pain    Rib pain on right side       pantoprazole 40 MG EC tablet    PROTONIX    180 tablet    TAKE 1 TABLET BY MOUTH 2 TIMES DAILY. TAKE BY MOUTH 30-60 MINUTES BEFORE A MEAL    GERD (gastroesophageal reflux disease)       sertraline 25 MG tablet    ZOLOFT    30 tablet    Take 1 tablet (25 mg) by mouth daily    Moderate major depression (H), Anxiety       simethicone 80 MG chewable tablet    MYLICON    20 tablet    Take 1 tablet (80 mg) by mouth 4 times daily as needed for cramping (gas)    S/P bilateral salpingo-oophorectomy       umeclidinium-vilanterol 62.5-25 MCG/INH oral inhaler    ANORO ELLIPTA    1 Inhaler    Inhale 1 puff into the lungs daily    Chronic obstructive pulmonary disease, unspecified COPD type (H)

## 2018-08-01 NOTE — LETTER
[facility]  [address] [place] [address]    August 1, 2018    Michela MERCADO [place]  [address] [place] [address]          Dear Michela,    No rib fractures noted.     If you have any questions please feel free to contact [phone] or myself via [employer].     Enclosed is a copy of your results.     Results for orders placed or performed in visit on 08/01/18   XR Ribs & Chest Right G/E 3 Views    Narrative    RIGHT RIBS AND CHEST THREE VIEWS   8/1/2018 12:29 PM     HISTORY:  Rib pain on right side.    COMPARISON: None.      Impression    IMPRESSION: No evidence of displaced right-sided rib fracture. No  pneumothorax or pleural effusion on either side. The lungs are clear.  Heart size normal.    LUZ CARTAGENA MD       If you have any questions or concerns, please call myself or my nurse at [phone].      Sincerely,        Swapna Edwards, GOLDIE/barrett

## 2018-09-06 DIAGNOSIS — F33.1 MODERATE EPISODE OF RECURRENT MAJOR DEPRESSIVE DISORDER (H): ICD-10-CM

## 2018-09-07 RX ORDER — BUPROPION HYDROCHLORIDE 200 MG/1
TABLET, EXTENDED RELEASE ORAL
Qty: 180 TABLET | Refills: 0 | Status: SHIPPED | OUTPATIENT
Start: 2018-09-07 | End: 2018-12-03

## 2018-09-07 NOTE — TELEPHONE ENCOUNTER
"Pending Prescriptions:                       Disp   Refills    buPROPion (WELLBUTRIN SR) 200 MG 12 hr ta*180 ta*0            Sig: TAKE ONE TABLET BY MOUTH TWICE DAILY.     Routing refill request to provider for review/approval because:  Last PHQ-9 >5 (scored 7 on 4/25/18).     Requested Prescriptions   Pending Prescriptions Disp Refills     buPROPion (WELLBUTRIN SR) 200 MG 12 hr tablet [Pharmacy Med Name: BuPROPion HCl ER (SR) Oral Tablet Extended Release 12 Hour 200 MG] 180 tablet 0     Sig: TAKE ONE TABLET BY MOUTH TWICE DAILY.    SSRIs Protocol Failed    9/6/2018  8:34 AM       Failed - PHQ-9 score less than 5 in past 6 months    Please review last PHQ-9 score.          Passed - Medication is Bupropion    If the medication is Bupropion (Wellbutrin), and the patient is taking for smoking cessation; OK to refill.         Passed - Patient is age 18 or older       Passed - No active pregnancy on record       Passed - No positive pregnancy test in last 12 months       Passed - Recent (6 mo) or future (30 days) visit within the authorizing provider's specialty    Patient had office visit in the last 6 months or has a visit in the next 30 days with authorizing provider or within the authorizing provider's specialty.  See \"Patient Info\" tab in inbasket, or \"Choose Columns\" in Meds & Orders section of the refill encounter.            Bryanna Benjamin RN    "

## 2018-10-02 ENCOUNTER — RADIANT APPOINTMENT (OUTPATIENT)
Dept: MAMMOGRAPHY | Facility: CLINIC | Age: 68
End: 2018-10-02
Payer: COMMERCIAL

## 2018-10-02 DIAGNOSIS — Z12.31 VISIT FOR SCREENING MAMMOGRAM: ICD-10-CM

## 2018-10-02 PROCEDURE — 77067 SCR MAMMO BI INCL CAD: CPT | Mod: TC

## 2018-10-10 ENCOUNTER — OFFICE VISIT (OUTPATIENT)
Dept: URGENT CARE | Facility: URGENT CARE | Age: 68
End: 2018-10-10
Payer: COMMERCIAL

## 2018-10-10 VITALS
RESPIRATION RATE: 17 BRPM | BODY MASS INDEX: 25.6 KG/M2 | TEMPERATURE: 98.2 F | OXYGEN SATURATION: 98 % | SYSTOLIC BLOOD PRESSURE: 110 MMHG | DIASTOLIC BLOOD PRESSURE: 71 MMHG | WEIGHT: 130 LBS | HEART RATE: 79 BPM

## 2018-10-10 DIAGNOSIS — R35.0 URINARY FREQUENCY: Primary | ICD-10-CM

## 2018-10-10 DIAGNOSIS — N30.00 ACUTE CYSTITIS WITHOUT HEMATURIA: ICD-10-CM

## 2018-10-10 LAB
ALBUMIN UR-MCNC: ABNORMAL MG/DL
APPEARANCE UR: ABNORMAL
BILIRUB UR QL STRIP: NEGATIVE
COLOR UR AUTO: YELLOW
GLUCOSE UR STRIP-MCNC: NEGATIVE MG/DL
HGB UR QL STRIP: ABNORMAL
KETONES UR STRIP-MCNC: NEGATIVE MG/DL
LEUKOCYTE ESTERASE UR QL STRIP: ABNORMAL
NITRATE UR QL: POSITIVE
PH UR STRIP: 6 PH (ref 5–7)
SOURCE: ABNORMAL
SP GR UR STRIP: 1.02 (ref 1–1.03)
UROBILINOGEN UR STRIP-ACNC: 0.2 EU/DL (ref 0.2–1)

## 2018-10-10 PROCEDURE — 87086 URINE CULTURE/COLONY COUNT: CPT | Performed by: PHYSICIAN ASSISTANT

## 2018-10-10 PROCEDURE — 87186 SC STD MICRODIL/AGAR DIL: CPT | Performed by: PHYSICIAN ASSISTANT

## 2018-10-10 PROCEDURE — 87088 URINE BACTERIA CULTURE: CPT | Performed by: PHYSICIAN ASSISTANT

## 2018-10-10 PROCEDURE — 81001 URINALYSIS AUTO W/SCOPE: CPT | Performed by: NURSE PRACTITIONER

## 2018-10-10 PROCEDURE — 99213 OFFICE O/P EST LOW 20 MIN: CPT | Performed by: PHYSICIAN ASSISTANT

## 2018-10-10 RX ORDER — NITROFURANTOIN 25; 75 MG/1; MG/1
100 CAPSULE ORAL 2 TIMES DAILY
Qty: 14 CAPSULE | Refills: 0 | Status: SHIPPED | OUTPATIENT
Start: 2018-10-10 | End: 2018-11-09

## 2018-10-10 ASSESSMENT — ENCOUNTER SYMPTOMS
MUSCULOSKELETAL NEGATIVE: 1
RESPIRATORY NEGATIVE: 1
ABDOMINAL PAIN: 0
NAUSEA: 0
DIARRHEA: 0
FEVER: 0
RHINORRHEA: 0
CHILLS: 0
HEADACHES: 0
MYALGIAS: 0
FATIGUE: 0
GASTROINTESTINAL NEGATIVE: 1
VOMITING: 0
COUGH: 0
NECK STIFFNESS: 0
DYSURIA: 1
POLYDIPSIA: 0
PALPITATIONS: 0
LIGHT-HEADEDNESS: 0
ACTIVITY CHANGE: 0
DIZZINESS: 0
NECK PAIN: 0
FLANK PAIN: 0
WEAKNESS: 0
SORE THROAT: 0
ADENOPATHY: 0
SHORTNESS OF BREATH: 0
CONSTITUTIONAL NEGATIVE: 1
ENDOCRINE NEGATIVE: 1
HEMATURIA: 0
FREQUENCY: 1
CARDIOVASCULAR NEGATIVE: 1
NEUROLOGICAL NEGATIVE: 1

## 2018-10-10 NOTE — PROGRESS NOTES
Chief Complaint:    Chief Complaint   Patient presents with     UTI       HPI:  Michela Cole is a 68 year old female who has symptoms of urinary urgency and frequency for 1 week(s).  she denies dysuria, back pain, nausea, vomiting, fever and chills, vaginal discharge, and vaginal odor.    ROS:      Review of Systems   Constitutional: Negative.  Negative for activity change, chills, fatigue and fever.   HENT: Negative for congestion, ear pain, rhinorrhea and sore throat.    Respiratory: Negative.  Negative for cough and shortness of breath.    Cardiovascular: Negative.  Negative for chest pain and palpitations.   Gastrointestinal: Negative.  Negative for abdominal pain, diarrhea, nausea and vomiting.   Endocrine: Negative.  Negative for polydipsia and polyuria.   Genitourinary: Positive for dysuria, frequency and urgency. Negative for flank pain, hematuria, pelvic pain, vaginal discharge and vaginal pain.   Musculoskeletal: Negative.  Negative for myalgias, neck pain and neck stiffness.   Allergic/Immunologic: Negative for immunocompromised state.   Neurological: Negative.  Negative for dizziness, weakness, light-headedness and headaches.   Hematological: Negative for adenopathy.       Family History   Family History   Problem Relation Age of Onset     Cervical Cancer Mother      Lymph nodes were also involved, no chemotherapy needed.       Diabetes Father      Hyperlipidemia Father      HEART DISEASE Maternal Grandfather      HEART DISEASE Paternal Grandmother      HEART DISEASE Paternal Grandfather      Glaucoma No family hx of      Macular Degeneration No family hx of      Hypertension No family hx of      Cerebrovascular Disease No family hx of      Thyroid Disease No family hx of         Problem history  Patient Active Problem List   Diagnosis     Vitamin D deficiency     Advanced directives, counseling/discussion     Cataract     COPD (chronic obstructive pulmonary disease) (H)     Moderate major  "depression (H)     Fatigue     Health Care Home     Cyst of left ovary     Pelvic cyst     Anxiety     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     Stress     Restless legs syndrome (RLS)     CAD (coronary artery disease)     Moderate episode of recurrent major depressive disorder (H)     S/P BSO (bilateral salpingo-oophorectomy)        Allergies  Allergies   Allergen Reactions     Sulfa Drugs Rash and Hives     Plavix [Clopidogrel] Other (See Comments)     Other reaction(s): Other - Describe In Comment Field  Not effective at preventing clots  P2Y12 testing done at [facility] in 12/2014 indicates a non responder to Plavix     Sulfasalazine Rash        Social History  Social History     Social History     Marital status:      Spouse name: N/A     Number of children: N/A     Years of education: N/A     Occupational History     Not on file.     Social History Main Topics     Smoking status: Former Smoker     Packs/day: 0.25     Years: 42.00     Types: Cigarettes     Quit date: 6/24/2013     Smokeless tobacco: Never Used     Alcohol use 3.6 oz/week     0 Standard drinks or equivalent, 6 Cans of beer per week      Comment: A couple times a week, \"three beers max\"     Drug use: No     Sexual activity: Yes     Partners: Male     Birth control/ protection: Post-menopausal, Female Surgical     Other Topics Concern     Not on file     Social History Narrative        Current Meds    Current Outpatient Prescriptions:      albuterol (PROAIR HFA/PROVENTIL HFA/VENTOLIN HFA) 108 (90 BASE) MCG/ACT Inhaler, Inhale 2 puffs into the lungs every 6 hours as needed for shortness of breath / dyspnea, Disp: 2 Inhaler, Rfl: 2     aspirin EC 81 MG tablet, Take 81 mg by mouth every morning , Disp: , Rfl:      atorvastatin (LIPITOR) 40 MG tablet, Take 1 tablet (40 mg) by mouth At Bedtime, Disp: 90 tablet, Rfl: 1     BRILINTA 60 MG tablet, Take 60 mg by mouth daily, Disp: , Rfl: 11     buPROPion (WELLBUTRIN SR) 200 MG 12 hr tablet, TAKE ONE TABLET BY MOUTH " TWICE DAILY. , Disp: 180 tablet, Rfl: 0     diclofenac (VOLTAREN) 1 % GEL topical gel, Apply  2 grams to hands four times daily using enclosed dosing card., Disp: 100 g, Rfl: 0     docusate sodium (COLACE) 100 MG tablet, Take 100 mg by mouth daily, Disp: 60 tablet, Rfl: 0     isosorbide mononitrate (IMDUR) 30 MG 24 hr tablet, Take 2 tablets (60 mg) by mouth daily, Disp: 180 tablet, Rfl: 1     levothyroxine (SYNTHROID/LEVOTHROID) 50 MCG tablet, Take 1 tablet (50 mcg) by mouth daily, Disp: 90 tablet, Rfl: 3     LORazepam (ATIVAN) 0.5 MG tablet, TAKE 1/2-1 TABLET BY MOUTH EVERY 8 HOURS AS NEEDED FOR ANXIETY. DO NOT OPERATE A VEHICLE AFTER TAKING THIS MEDICATION, Disp: 1 tablet, Rfl: 0     metoprolol (TOPROL-XL) 50 MG 24 hr tablet, Take 1 tablet (50 mg) by mouth daily (Patient taking differently: Take 50 mg by mouth every morning ), Disp: 90 tablet, Rfl: 3     nitroglycerin (NITROSTAT) 0.4 MG sublingual tablet, Place 1 tablet (0.4 mg) under the tongue every 5 minutes as needed for chest pain, Disp: 25 tablet, Rfl: 11     order for DME, Equipment being ordered: incentive spirometry, Disp: 1 Device, Rfl: 0     oxyCODONE-acetaminophen (PERCOCET) 5-325 MG per tablet, Take 1 tablet by mouth every 6 hours as needed for pain, Disp: 15 tablet, Rfl: 0     pantoprazole (PROTONIX) 40 MG EC tablet, TAKE 1 TABLET BY MOUTH 2 TIMES DAILY. TAKE BY MOUTH 30-60 MINUTES BEFORE A MEAL, Disp: 180 tablet, Rfl: 2     sertraline (ZOLOFT) 25 MG tablet, Take 1 tablet (25 mg) by mouth daily, Disp: 30 tablet, Rfl: 0     simethicone (MYLICON) 80 MG chewable tablet, Take 1 tablet (80 mg) by mouth 4 times daily as needed for cramping (gas), Disp: 20 tablet, Rfl: 0     umeclidinium-vilanterol (ANORO ELLIPTA) 62.5-25 MCG/INH oral inhaler, Inhale 1 puff into the lungs daily, Disp: 1 Inhaler, Rfl: 5     OBJECTIVE     Vital signs noted and reviewed by Alex Brink  /71 (BP Location: Left arm, Patient Position: Chair, Cuff Size: Adult Regular)   Pulse 79  Temp 98.2  F (36.8  C) (Oral)  Resp 17  Wt 130 lb (59 kg)  SpO2 98%  BMI 25.6 kg/m2     Physical Exam   Constitutional: She is oriented to person, place, and time. She appears well-developed and well-nourished. No distress.   HENT:   Head: Normocephalic and atraumatic.   Right Ear: Tympanic membrane and external ear normal.   Left Ear: Tympanic membrane and external ear normal.   Mouth/Throat: Oropharynx is clear and moist.   Eyes: EOM are normal. Pupils are equal, round, and reactive to light.   Neck: Normal range of motion. Neck supple.   Cardiovascular: Normal rate, regular rhythm, normal heart sounds and intact distal pulses.  Exam reveals no gallop and no friction rub.    No murmur heard.  Pulmonary/Chest: Effort normal and breath sounds normal. No respiratory distress. She has no wheezes. She has no rales. She exhibits no tenderness.   Abdominal: Soft. Bowel sounds are normal. She exhibits no distension and no mass. There is no hepatosplenomegaly. There is no tenderness. There is no rigidity, no rebound, no guarding and no CVA tenderness.   Lymphadenopathy:     She has no cervical adenopathy.   Neurological: She is alert and oriented to person, place, and time. She has normal reflexes. No cranial nerve deficit.   Skin: Skin is warm and dry. She is not diaphoretic.   Psychiatric: She has a normal mood and affect. Her behavior is normal. Judgment and thought content normal.   Nursing note and vitals reviewed.            Labs:     Results for orders placed or performed in visit on 10/10/18   *UA reflex to Microscopic and Culture ([place] and [facility] (except [place] and [place])   Result Value Ref Range    Color Urine Yellow     Appearance Urine Slightly Cloudy     Glucose Urine Negative NEG^Negative mg/dL    Bilirubin Urine Negative NEG^Negative    Ketones Urine Negative NEG^Negative mg/dL    Specific Gravity Urine 1.025 1.003 - 1.035    Blood Urine Moderate (A) NEG^Negative    pH Urine  6.0 5.0 - 7.0 pH    Protein Albumin Urine Trace (A) NEG^Negative mg/dL    Urobilinogen Urine 0.2 0.2 - 1.0 EU/dL    Nitrite Urine Positive (A) NEG^Negative    Leukocyte Esterase Urine Large (A) NEG^Negative    Source Midstream Urine    Urine Microscopic   Result Value Ref Range    WBC Urine  (A) OTO5^0 - 5 /HPF    RBC Urine PENDING OTO2^O - 2 /HPF    RBC Cast 25-50 (A) NEG^Negative /LPF    Squamous Epithelial /LPF Urine Few FEW^Few /LPF    Bacteria Urine Many (A) NEG^Negative /HPF          ASSESSMENT     (R35.0) Urinary frequency  (primary encounter diagnosis)  Plan: *UA reflex to Microscopic and Culture ([place]         and [facility] (except [place] and         [place]), Urine Microscopic, Urine Culture         Aerobic Bacterial    (N30.00) Acute cystitis without hematuria  Plan: nitroFURantoin, macrocrystal-monohydrate,         (MACROBID) 100 MG capsule       PLAN    Urinalysis discussed with patient  We will call with culture results if resistant.  Rx for Macrobid today  Treatment currentguidelines - also push fluids, may use Pyridium OTC prn.   Follow up with PCP in 2-3 days if symptoms are not improving.  Worrisome symptoms discussed with instructions to go to the ED.  Patient verbalized understanding and agreed with this plan.          Alex Brink  10/10/2018, 4:54 PM

## 2018-10-10 NOTE — MR AVS SNAPSHOT
After Visit Summary   10/10/2018    Michela Cole    MRN: [id number]           Patient Information     Date Of Birth          1950        Visit Information        Provider Department      10/10/2018 4:35 PM Alex Brink PA-C [facility]        Today's Diagnoses     Urinary frequency    -  1    Acute cystitis without hematuria           Follow-ups after your visit        Who to contact     If you have questions or need follow up information about today's clinic visit or your schedule please contact [facility] directly at [phone].  Normal or non-critical lab and imaging results will be communicated to you by MyChart, letter or phone within 4 business days after the clinic has received the results. If you do not hear from us within 7 days, please contact the clinic through MyChart or phone. If you have a critical or abnormal lab result, we will notify you by phone as soon as possible.  Submit refill requests through [employer] or call your pharmacy and they will forward the refill request to us. Please allow 3 business days for your refill to be completed.          Additional Information About Your Visit        Care EveryWhere ID     This is your Care EveryWhere ID. This could be used by other organizations to access your [place] medical records  [id number]        Your Vitals Were     Pulse Temperature Respirations Pulse Oximetry BMI (Body Mass Index)       79 98.2  F (36.8  C) (Oral) 17 98% 25.6 kg/m2        Blood Pressure from Last 3 Encounters:   10/10/18 110/71   08/01/18 122/80   05/15/18 113/67    Weight from Last 3 Encounters:   10/10/18 130 lb (59 kg)   08/01/18 133 lb 9.6 oz (60.6 kg)   05/15/18 133 lb (60.3 kg)              We Performed the Following     *UA reflex to Microscopic and Culture ([place] and [facility] (except [place] and [place])     Urine Culture Aerobic Bacterial     Urine Microscopic          Today's  Medication Changes          These changes are accurate as of 10/10/18  5:10 PM.  If you have any questions, ask your nurse or doctor.               Start taking these medicines.        Dose/Directions    nitroFURantoin (macrocrystal-monohydrate) 100 MG capsule   Commonly known as:  MACROBID   Used for:  Acute cystitis without hematuria   Started by:  Alex Brink PA-C        Dose:  100 mg   Take 1 capsule (100 mg) by mouth 2 times daily   Quantity:  14 capsule   Refills:  0         These medicines have changed or have updated prescriptions.        Dose/Directions    metoprolol succinate 50 MG 24 hr tablet   Commonly known as:  TOPROL-XL   This may have changed:  when to take this   Used for:  Coronary artery disease involving native coronary artery of native heart without angina pectoris        Dose:  50 mg   Take 1 tablet (50 mg) by mouth daily   Quantity:  90 tablet   Refills:  3            Where to get your medicines      These medications were sent to [id number] - [place], [place] - [address]  [address] [address]     Phone:  [phone]     nitroFURantoin (macrocrystal-monohydrate) 100 MG capsule                Primary Care Provider Office Phone # Fax #    Frannie Allen [phone] [address] [address]        Goals        General    Functional (pt-stated)     Notes - Note created  9/1/2015 11:20 AM by Janneth Orellana RN    I will rinse my mouth out after using my inhaler to help keep the lining of my mouth healthy  As of today's date 9/1/2015 goal is met at 0 - 25%.   Goal Status:  Ongoing      Medication (pt-stated)     Notes - Note edited  9/1/2015  2:10 PM by Janneth Orellana RN    I will use my inhalers as instructed  As of today's date 9/1/2015 goal is met at 0 - 25%.   Goal Status:  Activeedule  Appointment with ENT       Psychosocial (pt-stated)     Notes - Note edited  9/1/2015  2:10 PM by Janneth Orellana RN    I will   Investigate counseling and/or support groups if and when I am ready to get formal support  As of today's date 9/1/2015 goal is met at 0 - 25%.   Goal Status:  Ongoing        Equal Access to Services     DARCYDOMENIC JENNIFER : Hadii aad ku hadbetzybalwinder Quispe, franciscoda alanamarleeha, latricia kameaghan murrieta, charline dsouzaargentina clarkeurmila suresh benji rose. So [facility] [phone].    ATENCIÓN: Si habla español, tiene a de la rosa disposición servicios gratuitos de asistencia lingüística. Llame al [phone].    We comply with applicable federal civil rights laws and [place] laws. We do not discriminate on the basis of race, color, national origin, age, disability, sex, sexual orientation, or gender identity.            Thank you!     Thank you for choosing [facility]  for your care. Our goal is always to provide you with excellent care. Hearing back from our patients is one way we can continue to improve our services. Please take a few minutes to complete the written survey that you may receive in the mail after your visit with us. Thank you!             Your Updated Medication List - Protect others around you: Learn how to safely use, store and throw away your medicines at www.disposemymeds.org.          This list is accurate as of 10/10/18  5:10 PM.  Always use your most recent med list.                   Brand Name Dispense Instructions for use Diagnosis    albuterol 108 (90 Base) MCG/ACT inhaler    PROAIR HFA/PROVENTIL HFA/VENTOLIN HFA    2 Inhaler    Inhale 2 puffs into the lungs every 6 hours as needed for shortness of breath / dyspnea    Chronic obstructive pulmonary disease, unspecified COPD type (H)       aspirin 81 MG EC tablet      Take 81 mg by mouth every morning        atorvastatin 40 MG tablet    LIPITOR    90 tablet    Take 1 tablet (40 mg) by mouth At Bedtime    Hyperlipidemia LDL goal <100       BRILINTA 60 MG tablet   Generic drug:  ticagrelor      Take 60 mg by mouth daily        buPROPion 200 MG 12 hr  tablet    WELLBUTRIN SR    180 tablet    TAKE ONE TABLET BY MOUTH TWICE DAILY.    Moderate episode of recurrent major depressive disorder (H)       diclofenac 1 % Gel topical gel    VOLTAREN    100 g    Apply  2 grams to hands four times daily using enclosed dosing card.    Primary osteoarthritis of both hands       docusate sodium 100 MG tablet    COLACE    60 tablet    Take 100 mg by mouth daily    S/P bilateral salpingo-oophorectomy       isosorbide mononitrate 30 MG 24 hr tablet    IMDUR    180 tablet    Take 2 tablets (60 mg) by mouth daily    Coronary artery disease involving native coronary artery of native heart without angina pectoris       levothyroxine 50 MCG tablet    SYNTHROID/LEVOTHROID    90 tablet    Take 1 tablet (50 mcg) by mouth daily    Hypothyroidism, unspecified type       LORazepam 0.5 MG tablet    ATIVAN    1 tablet    TAKE 1/2-1 TABLET BY MOUTH EVERY 8 HOURS AS NEEDED FOR ANXIETY. DO NOT OPERATE A VEHICLE AFTER TAKING THIS MEDICATION    Anxiety       metoprolol succinate 50 MG 24 hr tablet    TOPROL-XL    90 tablet    Take 1 tablet (50 mg) by mouth daily    Coronary artery disease involving native coronary artery of native heart without angina pectoris       nitroFURantoin (macrocrystal-monohydrate) 100 MG capsule    MACROBID    14 capsule    Take 1 capsule (100 mg) by mouth 2 times daily    Acute cystitis without hematuria       nitroGLYcerin 0.4 MG sublingual tablet    NITROSTAT    25 tablet    Place 1 tablet (0.4 mg) under the tongue every 5 minutes as needed for chest pain    Coronary artery disease involving native coronary artery of native heart without angina pectoris       order for DME     1 Device    Equipment being ordered: incentive spirometry    Pneumonia of right middle lobe due to infectious organism (H)       oxyCODONE-acetaminophen 5-325 MG per tablet    PERCOCET    15 tablet    Take 1 tablet by mouth every 6 hours as needed for pain    Rib pain on right side        pantoprazole 40 MG EC tablet    PROTONIX    180 tablet    TAKE 1 TABLET BY MOUTH 2 TIMES DAILY. TAKE BY MOUTH 30-60 MINUTES BEFORE A MEAL    GERD (gastroesophageal reflux disease)       sertraline 25 MG tablet    ZOLOFT    30 tablet    Take 1 tablet (25 mg) by mouth daily    Moderate major depression (H), Anxiety       simethicone 80 MG chewable tablet    MYLICON    20 tablet    Take 1 tablet (80 mg) by mouth 4 times daily as needed for cramping (gas)    S/P bilateral salpingo-oophorectomy       umeclidinium-vilanterol 62.5-25 MCG/INH oral inhaler    ANORO ELLIPTA    1 Inhaler    Inhale 1 puff into the lungs daily    Chronic obstructive pulmonary disease, unspecified COPD type (H)

## 2018-10-11 LAB
BACTERIA #/AREA URNS HPF: ABNORMAL /HPF
NON-SQ EPI CELLS #/AREA URNS LPF: ABNORMAL /LPF
RBC #/AREA URNS AUTO: ABNORMAL /HPF
RBC CASTS #/AREA URNS LPF: ABNORMAL /LPF
URNS CMNT MICRO: ABNORMAL
WBC #/AREA URNS AUTO: ABNORMAL /HPF

## 2018-10-12 ENCOUNTER — TELEPHONE (OUTPATIENT)
Dept: URGENT CARE | Facility: URGENT CARE | Age: 68
End: 2018-10-12

## 2018-10-12 DIAGNOSIS — N39.0 URINARY TRACT INFECTION WITHOUT HEMATURIA, SITE UNSPECIFIED: Primary | ICD-10-CM

## 2018-10-12 LAB
BACTERIA SPEC CULT: ABNORMAL
SPECIMEN SOURCE: ABNORMAL

## 2018-10-12 RX ORDER — CIPROFLOXACIN 250 MG/1
250 TABLET, FILM COATED ORAL 2 TIMES DAILY
Qty: 10 TABLET | Refills: 0 | Status: SHIPPED | OUTPATIENT
Start: 2018-10-12 | End: 2018-10-17

## 2018-10-12 NOTE — TELEPHONE ENCOUNTER
Notes Recorded by Annita Warner PA-C on 10/12/2018 at 10:21 AM  Your urine culture was positive. It shows that the Macrobid antibiotic may not be the best for this infection but could work. Monitor your symptoms and if not better in 3 days call us back. Follow up with your Primary Care Provider if no resolution of symptoms.    Please notify patient with above.    Annita Warner PA-C    ------    Notes Recorded by Jaimee Venegas PA-C on 10/11/2018 at 10:45 AM  Pt on macrobid; await sensitivities.      Provider, patient is stating that since starting Macrobid her symptoms have not gotten any better, she believes they have even worsened. Patient is wondering if there is another Abx that could be prescribed? Pharmacy pended.     Routing to advising UC provider and general UC provider pool.    Aliyah Dunn RN

## 2018-10-12 NOTE — TELEPHONE ENCOUNTER
Let patient know I sent a prescription for Cipro to [facility] [place].  Annita Warner PA-C

## 2018-10-18 DIAGNOSIS — E78.5 HYPERLIPIDEMIA LDL GOAL <100: ICD-10-CM

## 2018-10-18 RX ORDER — ATORVASTATIN CALCIUM 40 MG/1
TABLET, FILM COATED ORAL
Qty: 90 TABLET | Refills: 0 | Status: SHIPPED | OUTPATIENT
Start: 2018-10-18 | End: 2018-11-09

## 2018-10-18 NOTE — TELEPHONE ENCOUNTER
Prescription approved per [facility] Refill Protocol.  Nelda Appiah RN

## 2018-10-24 DIAGNOSIS — E78.5 HYPERLIPIDEMIA LDL GOAL <100: ICD-10-CM

## 2018-10-24 RX ORDER — ATORVASTATIN CALCIUM 40 MG/1
TABLET, FILM COATED ORAL
Qty: 90 TABLET | Refills: 0
Start: 2018-10-24

## 2018-10-24 NOTE — TELEPHONE ENCOUNTER
Spoke to pharmacy and confirmed duplicate request.    atorvastatin (LIPITOR) 40 MG tablet 90 tablet 0 10/18/2018  No      Sig: Take 1 tablet (40 mg) by mouth At Bedtime     Class: E-Prescribe     Order: [id number]     E-Prescribing Status: Receipt confirmed by pharmacy (10/18/2018  2:18 PM CDT)       Umu CAMPOVERDE CMA ([facility])

## 2018-11-09 ENCOUNTER — OFFICE VISIT (OUTPATIENT)
Dept: FAMILY MEDICINE | Facility: CLINIC | Age: 68
End: 2018-11-09
Payer: COMMERCIAL

## 2018-11-09 VITALS
HEIGHT: 60 IN | BODY MASS INDEX: 25.72 KG/M2 | HEART RATE: 81 BPM | TEMPERATURE: 97.4 F | OXYGEN SATURATION: 98 % | SYSTOLIC BLOOD PRESSURE: 110 MMHG | RESPIRATION RATE: 16 BRPM | WEIGHT: 131 LBS | DIASTOLIC BLOOD PRESSURE: 66 MMHG

## 2018-11-09 DIAGNOSIS — N30.00 ACUTE CYSTITIS WITHOUT HEMATURIA: ICD-10-CM

## 2018-11-09 DIAGNOSIS — G25.81 RESTLESS LEGS SYNDROME (RLS): ICD-10-CM

## 2018-11-09 DIAGNOSIS — J44.1 CHRONIC OBSTRUCTIVE PULMONARY DISEASE WITH ACUTE EXACERBATION (H): Primary | ICD-10-CM

## 2018-11-09 LAB

## 2018-11-09 PROCEDURE — 99214 OFFICE O/P EST MOD 30 MIN: CPT | Performed by: NURSE PRACTITIONER

## 2018-11-09 PROCEDURE — 81001 URINALYSIS AUTO W/SCOPE: CPT | Performed by: NURSE PRACTITIONER

## 2018-11-09 RX ORDER — CEPHALEXIN 500 MG/1
500 CAPSULE ORAL 2 TIMES DAILY
Qty: 14 CAPSULE | Refills: 0 | Status: SHIPPED | OUTPATIENT
Start: 2018-11-09 | End: 2018-11-16

## 2018-11-09 RX ORDER — PREDNISONE 20 MG/1
40 TABLET ORAL DAILY
Qty: 10 TABLET | Refills: 0 | Status: SHIPPED | OUTPATIENT
Start: 2018-11-09 | End: 2018-11-14

## 2018-11-09 RX ORDER — ROSUVASTATIN CALCIUM 20 MG/1
20 TABLET, COATED ORAL
COMMUNITY
Start: 2018-10-29

## 2018-11-09 RX ORDER — PRAMIPEXOLE DIHYDROCHLORIDE 0.12 MG/1
0.12 TABLET ORAL AT BEDTIME
Qty: 90 TABLET | Refills: 1 | Status: SHIPPED | OUTPATIENT
Start: 2018-11-09 | End: 2019-07-25

## 2018-11-09 NOTE — PROGRESS NOTES
SUBJECTIVE:   Michela Cole is a 68 year old female who presents to clinic today for the following health issues:      Acute Illness   Acute illness concerns: cough  Onset: 2 week    Fever: no    Chills/Sweats: no    Headache (location?): no    Sinus Pressure:YES    Conjunctivitis:  no    Ear Pain: no    Rhinorrhea: no    Congestion: YES    Sore Throat: YES     Cough: YES- thick sputum feels stuck in chest    Wheeze: no    Decreased Appetite: no    Nausea: no    Vomiting: no    Diarrhea:  no    Dysuria/Freq.: YES    Fatigue/Achiness: no    Sick/Strep Exposure: no     Therapies Tried and outcome: sudafed, [place], water    Hasn't been using Ventolin but does feel very SOB. Cough triggered from sputum being stuck in chest.    URINARY TRACT SYMPTOMS      Duration: 1 week    Description  dysuria and frequency and cramping    Intensity:  moderate    Accompanying signs and symptoms:  Fever/chills: no   Flank pain no   Nausea and vomiting: no   Vaginal symptoms: none  Abdominal/Pelvic Pain: no     History  History of frequent UTI's: YES  History of kidney stones: no   Sexually Active: YES  Possibility of pregnancy: No    Precipitating or alleviating factors: None    Therapies tried and outcome: increase fluid intake   Outcome:     1 month ago was treated for UTI in Urgent Care (Macrobid), then antibiotic changed (Cipro) due to culture results. Symptoms did improve but then returned again 1 week ago.    Has restless legs and went off Mirapex as it made her too sleepy- was sleeping poorly due to RLS so Cardiologist restarted her Mirapex and now sleeping much better.     Problem list and histories reviewed & adjusted, as indicated.  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     Stress     Restless legs syndrome (RLS)     CAD (coronary artery disease)     Moderate episode of recurrent major depressive disorder (H)     S/P BSO (bilateral salpingo-oophorectomy)     Past Surgical History:   Procedure Laterality Date     APPENDECTOMY       C/SECTION, LOW TRANSVERSE      x 4     CORONARY ANGIOGRAPHY ADULT ORDER      Total of 5 coronary angiograms     HC DRUG-ELUTING STENTS, SINGLE  2014     HYSTERECTOMY TOTAL ABD, JIMENA SALPINGO-OOPHORECTOMY, NODE DISSECTION, TUMOR DEBULKING, COMBINED N/A 4/30/2018    Procedure: COMBINED HYSTERECTOMY TOTAL ABDOMINAL, SALPINGO-OOPHORECTOMY, NODE DISSECTION, TUMOR DEBULKING;;  Surgeon: Melody Bolivar MD;  Location: [facility] OR     LAPAROSCOPIC HYSTERECTOMY TOTAL, JIMENA SALPINGO-OOPHORECTOMY, NODE DISSECTION, TUMOR STAGING, COMBINED N/A 4/30/2018    Procedure: COMBINED LAPAROSCOPIC HYSTERECTOMY TOTAL, SALPINGO-OOPHORECTOMY, NODE DISSECTION, TUMOR STAGING;  Diagnostic Laparoscopy, Laparotomy, Extensive Lysis of Adhesions, Open Bilateral Salpingo-Oophorectomy, Partial Omentectomy, Anesthesia Block;  Surgeon: Melody Bolivar MD;  Location: [facility] OR     SPLENECTOMY  1978    ITP     TUBAL LIGATION      x2       Social History   Substance Use Topics     Smoking status: Former Smoker     Packs/day: 0.25     Years: 42.00     Types: Cigarettes     Quit date: 6/24/2013     Smokeless tobacco: Never Used     Alcohol use 3.6 oz/week     0 Standard drinks or equivalent, 6 Cans of beer per week      Comment: A couple times a week, \"three beers max\"     Family History   Problem Relation Age of Onset     Cervical Cancer Mother      Lymph nodes were also involved, no chemotherapy needed.       Diabetes Father      Hyperlipidemia Father      HEART DISEASE Maternal Grandfather      HEART DISEASE Paternal Grandmother      HEART DISEASE Paternal Grandfather      Glaucoma No family hx of      Macular Degeneration No " "family hx of      Hypertension No family hx of      Cerebrovascular Disease No family hx of      Thyroid Disease No family hx of            Reviewed and updated as needed this visit by clinical staff       Reviewed and updated as needed this visit by Provider         ROS:  Constitutional, HEENT, cardiovascular, pulmonary, GI, , musculoskeletal, neuro, skin, endocrine and psych systems are negative, except as otherwise noted.    OBJECTIVE:     /66  Pulse 81  Temp 97.4  F (36.3  C)  Resp 16  Ht 4' 11.75\" (1.518 m)  Wt 131 lb (59.4 kg)  SpO2 98%  BMI 25.8 kg/m2  Body mass index is 25.8 kg/(m^2).  GENERAL: healthy, alert and no distress  EYES: Eyes grossly normal to inspection, PERRL and conjunctivae and sclerae normal  HENT: ear canals and TM's normal, nose and mouth without ulcers or lesions  NECK: no adenopathy, no asymmetry, masses, or scars and thyroid normal to palpation  RESP: lungs clear to auscultation - no rales, rhonchi or wheezes  CV: regular rate and rhythm, normal S1 S2, no S3 or S4, no murmur, click or rub, no peripheral edema and peripheral pulses strong  ABDOMEN: soft, nontender  BACK: no CVA tenderness, no paralumbar tenderness    Diagnostic Test Results:  Results for orders placed or performed in visit on 11/09/18 (from the past 24 hour(s))   UA reflex to Microscopic and Culture   Result Value Ref Range    Color Urine Yellow     Appearance Urine Clear     Glucose Urine Negative NEG^Negative mg/dL    Bilirubin Urine Negative NEG^Negative    Ketones Urine Negative NEG^Negative mg/dL    Specific Gravity Urine 1.015 1.003 - 1.035    Blood Urine Negative NEG^Negative    pH Urine 6.0 5.0 - 7.0 pH    Protein Albumin Urine Negative NEG^Negative mg/dL    Urobilinogen Urine 0.2 0.2 - 1.0 EU/dL    Nitrite Urine Negative NEG^Negative    Leukocyte Esterase Urine Trace (A) NEG^Negative    Source Midstream Urine    Urine Microscopic   Result Value Ref Range    WBC Urine 0 - 5 OTO5^0 - 5 /HPF    RBC " Urine O - 2 OTO2^O - 2 /HPF    Squamous Epithelial /LPF Urine Moderate (A) FEW^Few /LPF    Bacteria Urine Few (A) NEG^Negative /HPF       ASSESSMENT/PLAN:     1. Chronic obstructive pulmonary disease with acute exacerbation (H)  Start Prednisone- risks/benefits reviewed. Encouraged patient to use Albuterol for SOB/couhg.  - predniSONE (DELTASONE) 20 MG tablet; Take 2 tablets (40 mg) by mouth daily for 5 days  Dispense: 10 tablet; Refill: 0    2. Acute cystitis without hematuria  UA probably false negative due to recent antibiotic use. Start course of Keflex due to persistent symptoms. Recheck UA in 2 weeks.  - UA reflex to Microscopic and Culture  - Urine Microscopic  - cephALEXin (KEFLEX) 500 MG capsule; Take 1 capsule (500 mg) by mouth 2 times daily for 7 days  Dispense: 14 capsule; Refill: 0  - **UA reflex to Microscopic FUTURE 14d; Future    3. Restless legs syndrome (RLS)  Continue medication without change  - pramipexole (MIRAPEX) 0.125 MG tablet; Take 1 tablet (0.125 mg) by mouth At Bedtime  Dispense: 90 tablet; Refill: 1    See Patient Instructions    JOSE [facility]

## 2018-11-09 NOTE — PATIENT INSTRUCTIONS
If you have a cold, you can take Coricidin HBP safely.    [facility]    If you have any questions regarding to your visit please contact your care team:       Team Red:   Clinic Hours Telephone Number   Dr. Desi Monge, NP   7am-7pm  Monday - Thursday   7am-5pm  Fridays  [phone]  (Appointment scheduling available 24/7)    Questions about your recent visit?   Team Line  [phone]   Urgent Care - [place] and [facility] - 11am-9pm Monday-Friday Saturday-Sunday- 9am-5pm   [place] - 5pm-9pm Monday-Friday Saturday-Sunday- 9am-5pm  [phone] - [place]  [phone] - [place]       What options do I have for a visit other than an office visit? We offer electronic visits (e-visits) and telephone visits, when medically appropriate.  Please check with your medical insurance to see if these types of visits are covered, as you will be responsible for any charges that are not paid by your insurance.      You can use [employer] (secure electronic communication) to access to your chart, send your primary care provider a message, or make an appointment. Ask a team member how to get started.     For a price quote for your services, please call our Consumer Price Line at [phone] or our Imaging Cost estimation line at [phone] (for imaging tests).    [facility]    If you have any questions regarding to your visit please contact your care team:       Team Red:   Clinic Hours Telephone Number   Dr. Desi Monge, NP 7am-7pm  Monday - Thursday   7am-5pm  Fridays  [phone]  (Appointment scheduling available 24/7)   Urgent Care - [place] and [facility] - 11am-9pm Monday-Friday Saturday-Sunday- 9am-5pm   [place] - 5pm-9pm Monday-Friday Saturday-Sunday- 9am-5pm  [phone] - [place] Doll  [phone] - [place]       What options do I have for a  visit other than an office visit? We offer electronic visits (e-visits) and telephone visits, when medically appropriate.  Please check with your medical insurance to see if these types of visits are covered, as you will be responsible for any charges that are not paid by your insurance.      You can use [employer] (secure electronic communication) to access to your chart, send your primary care provider a message, or make an appointment. Ask a team member how to get started.     For a price quote for your services, please call our Consumer Price Line at [phone] or our Imaging Cost estimation line at [phone] (for imaging tests).    Discharged by Gwen Chaidez MA.

## 2018-11-09 NOTE — MR AVS SNAPSHOT
After Visit Summary   11/9/2018    Michela Cole    MRN: [id number]           Patient Information     Date Of Birth          1950        Visit Information        Provider Department      11/9/2018 11:00 AM Neeta Monge APRN CNP [facility]        Today's Diagnoses     Chronic obstructive pulmonary disease with acute exacerbation (H)    -  1    Acute cystitis without hematuria        Restless legs syndrome (RLS)          Care Instructions    If you have a cold, you can take Coricidin HBP safely.    [facility]    If you have any questions regarding to your visit please contact your care team:       Team Red:   Clinic Hours Telephone Number   Dr. Desi Monge, NP   7am-7pm  Monday - Thursday   7am-5pm  Fridays  [phone]  (Appointment scheduling available 24/7)    Questions about your recent visit?   Team Line  [phone]   Urgent Care - [place] - 11am-9pm Monday-Friday Saturday-Sunday- 9am-5pm   [place] - 5pm-9pm Monday-Friday Saturday-Sunday- 9am-5pm  [phone] - [place]  [phone] - [place]       What options do I have for a visit other than an office visit? We offer electronic visits (e-visits) and telephone visits, when medically appropriate.  Please check with your medical insurance to see if these types of visits are covered, as you will be responsible for any charges that are not paid by your insurance.      You can use [employer] (secure electronic communication) to access to your chart, send your primary care provider a message, or make an appointment. Ask a team member how to get started.     For a price quote for your services, please call our Consumer Price Line at [phone] or our Imaging Cost estimation line at [phone] (for imaging tests).    [facility]    If you have any questions regarding to your visit please contact  your care team:       Team Red:   Clinic Hours Telephone Number   Dr. Desi Monge, NP 7am-7pm  Monday - Thursday   7am-5pm  Fridays  [phone]  (Appointment scheduling available 24/7)   Urgent Care - [place] - 11am-9pm Monday-Friday Saturday-Sunday- 9am-5pm   [place] - 5pm-9pm Monday-Friday Saturday-Sunday- 9am-5pm  [phone] - [place]  [phone] - [place]       What options do I have for a visit other than an office visit? We offer electronic visits (e-visits) and telephone visits, when medically appropriate.  Please check with your medical insurance to see if these types of visits are covered, as you will be responsible for any charges that are not paid by your insurance.      You can use [employer] (secure electronic communication) to access to your chart, send your primary care provider a message, or make an appointment. Ask a team member how to get started.     For a price quote for your services, please call our Consumer Price Line at [phone] or our Imaging Cost estimation line at [phone] (for imaging tests).    Discharged by Gwen Chaidez MA.            Follow-ups after your visit        Follow-up notes from your care team     Return in about 2 weeks (around 11/23/2018) for Lab Only Appointment.      Future tests that were ordered for you today     Open Future Orders        Priority Expected Expires Ordered    **UA reflex to Microscopic FUTURE 14d Routine 11/16/2018 11/23/2018 11/9/2018            Who to contact     If you have questions or need follow up information about today's clinic visit or your schedule please contact [facility] directly at [phone].  Normal or non-critical lab and imaging results will be communicated to you by MyChart, letter or phone within 4 business days after the clinic has received the results. If you do not hear from us within 7 days, please contact the  "clinic through [employer] or phone. If you have a critical or abnormal lab result, we will notify you by phone as soon as possible.  Submit refill requests through [employer] or call your pharmacy and they will forward the refill request to us. Please allow 3 business days for your refill to be completed.          Additional Information About Your Visit        Care EveryWhere ID     This is your Care EveryWhere ID. This could be used by other organizations to access your [place] medical records  [id number]        Your Vitals Were     Pulse Temperature Respirations Height Pulse Oximetry BMI (Body Mass Index)    81 97.4  F (36.3  C) 16 4' 11.75\" (1.518 m) 98% 25.8 kg/m2       Blood Pressure from Last 3 Encounters:   11/09/18 110/66   10/10/18 110/71   08/01/18 122/80    Weight from Last 3 Encounters:   11/09/18 131 lb (59.4 kg)   10/10/18 130 lb (59 kg)   08/01/18 133 lb 9.6 oz (60.6 kg)              We Performed the Following     UA reflex to Microscopic and Culture     Urine Microscopic          Today's Medication Changes          These changes are accurate as of 11/9/18 11:45 AM.  If you have any questions, ask your nurse or doctor.               Start taking these medicines.        Dose/Directions    cephALEXin 500 MG capsule   Commonly known as:  KEFLEX   Used for:  Acute cystitis without hematuria        Dose:  500 mg   Take 1 capsule (500 mg) by mouth 2 times daily for 7 days   Quantity:  14 capsule   Refills:  0       pramipexole 0.125 MG tablet   Commonly known as:  MIRAPEX   Used for:  Restless legs syndrome (RLS)        Dose:  0.125 mg   Take 1 tablet (0.125 mg) by mouth At Bedtime   Quantity:  90 tablet   Refills:  1       predniSONE 20 MG tablet   Commonly known as:  DELTASONE   Used for:  Chronic obstructive pulmonary disease with acute exacerbation (H)        Dose:  40 mg   Take 2 tablets (40 mg) by mouth daily for 5 days   Quantity:  10 tablet   Refills:  0         These medicines have changed or have " updated prescriptions.        Dose/Directions    metoprolol succinate 50 MG 24 hr tablet   Commonly known as:  TOPROL-XL   This may have changed:  when to take this   Used for:  Coronary artery disease involving native coronary artery of native heart without angina pectoris        Dose:  50 mg   Take 1 tablet (50 mg) by mouth daily   Quantity:  90 tablet   Refills:  3            Where to get your medicines      These medications were sent to [id number] - [place], [place] - [address]  [address] [facility] [place] [address]     Phone:  [phone]     cephALEXin 500 MG capsule    pramipexole 0.125 MG tablet    predniSONE 20 MG tablet                Primary Care Provider Office Phone # Fax #    Frannie Allen [phone] [facility] [id number]        Goals        General    Functional (pt-stated)     Notes - Note created  9/1/2015 11:20 AM by Janneth Orellana RN    I will rinse my mouth out after using my inhaler to help keep the lining of my mouth healthy  As of today's date 9/1/2015 goal is met at 0 - 25%.   Goal Status:  Ongoing      Medication (pt-stated)     Notes - Note edited  9/1/2015  2:10 PM by Janneth Orelalna RN    I will use my inhalers as instructed  As of today's date 9/1/2015 goal is met at 0 - 25%.   Goal Status:  Activeedule  Appointment with ENT       Psychosocial (pt-stated)     Notes - Note edited  9/1/2015  2:10 PM by Janneth Orellana, RN    I will  Investigate counseling and/or support groups if and when I am ready to get formal support  As of today's date 9/1/2015 goal is met at 0 - 25%.   Goal Status:  Ongoing        Equal Access to Services     [facility] AH: Hadii aad ku hadasho Soomaali, waaxda luqadaha, qaybta kaalmada adeegyada, charline leblanc . So [facility] [phone].    ATENCIÓN: Si habla español, tiene a de la rosa disposición servicios gratuitos de asistencia lingüística. Llame al [phone].    We comply with  applicable federal civil rights laws and [place] laws. We do not discriminate on the basis of race, color, national origin, age, disability, sex, sexual orientation, or gender identity.            Thank you!     Thank you for choosing [facility]  for your care. Our goal is always to provide you with excellent care. Hearing back from our patients is one way we can continue to improve our services. Please take a few minutes to complete the written survey that you may receive in the mail after your visit with us. Thank you!             Your Updated Medication List - Protect others around you: Learn how to safely use, store and throw away your medicines at www.disposemymeds.org.          This list is accurate as of 11/9/18 11:45 AM.  Always use your most recent med list.                   Brand Name Dispense Instructions for use Diagnosis    albuterol 108 (90 Base) MCG/ACT inhaler    PROAIR HFA/PROVENTIL HFA/VENTOLIN HFA    2 Inhaler    Inhale 2 puffs into the lungs every 6 hours as needed for shortness of breath / dyspnea    Chronic obstructive pulmonary disease, unspecified COPD type (H)       aspirin 81 MG EC tablet      Take 81 mg by mouth every morning        buPROPion 200 MG 12 hr tablet    WELLBUTRIN SR    180 tablet    TAKE ONE TABLET BY MOUTH TWICE DAILY.    Moderate episode of recurrent major depressive disorder (H)       cephALEXin 500 MG capsule    KEFLEX    14 capsule    Take 1 capsule (500 mg) by mouth 2 times daily for 7 days    Acute cystitis without hematuria       diclofenac 1 % Gel topical gel    VOLTAREN    100 g    Apply  2 grams to hands four times daily using enclosed dosing card.    Primary osteoarthritis of both hands       docusate sodium 100 MG tablet    COLACE    60 tablet    Take 100 mg by mouth daily    S/P bilateral salpingo-oophorectomy       isosorbide mononitrate 30 MG 24 hr tablet    IMDUR    180 tablet    Take 2 tablets (60 mg) by mouth daily    Coronary artery disease  involving native coronary artery of native heart without angina pectoris       levothyroxine 50 MCG tablet    SYNTHROID/LEVOTHROID    90 tablet    Take 1 tablet (50 mcg) by mouth daily    Hypothyroidism, unspecified type       LORazepam 0.5 MG tablet    ATIVAN    1 tablet    TAKE 1/2-1 TABLET BY MOUTH EVERY 8 HOURS AS NEEDED FOR ANXIETY. DO NOT OPERATE A VEHICLE AFTER TAKING THIS MEDICATION    Anxiety       metoprolol succinate 50 MG 24 hr tablet    TOPROL-XL    90 tablet    Take 1 tablet (50 mg) by mouth daily    Coronary artery disease involving native coronary artery of native heart without angina pectoris       nitroGLYcerin 0.4 MG sublingual tablet    NITROSTAT    25 tablet    Place 1 tablet (0.4 mg) under the tongue every 5 minutes as needed for chest pain    Coronary artery disease involving native coronary artery of native heart without angina pectoris       order for DME     1 Device    Equipment being ordered: incentive spirometry    Pneumonia of right middle lobe due to infectious organism (H)       oxyCODONE-acetaminophen 5-325 MG per tablet    PERCOCET    15 tablet    Take 1 tablet by mouth every 6 hours as needed for pain    Rib pain on right side       pantoprazole 40 MG EC tablet    PROTONIX    180 tablet    TAKE 1 TABLET BY MOUTH 2 TIMES DAILY. TAKE BY MOUTH 30-60 MINUTES BEFORE A MEAL    GERD (gastroesophageal reflux disease)       pramipexole 0.125 MG tablet    MIRAPEX    90 tablet    Take 1 tablet (0.125 mg) by mouth At Bedtime    Restless legs syndrome (RLS)       predniSONE 20 MG tablet    DELTASONE    10 tablet    Take 2 tablets (40 mg) by mouth daily for 5 days    Chronic obstructive pulmonary disease with acute exacerbation (H)       rosuvastatin 20 MG tablet    CRESTOR     Take 20 mg by mouth        sertraline 25 MG tablet    ZOLOFT    30 tablet    Take 1 tablet (25 mg) by mouth daily    Moderate major depression (H), Anxiety       simethicone 80 MG chewable tablet    MYLICON    20 tablet     Take 1 tablet (80 mg) by mouth 4 times daily as needed for cramping (gas)    S/P bilateral salpingo-oophorectomy       umeclidinium-vilanterol 62.5-25 MCG/INH oral inhaler    ANORO ELLIPTA    1 Inhaler    Inhale 1 puff into the lungs daily    Chronic obstructive pulmonary disease, unspecified COPD type (H)

## 2018-11-15 ENCOUNTER — TELEPHONE (OUTPATIENT)
Dept: FAMILY MEDICINE | Facility: CLINIC | Age: 68
End: 2018-11-15

## 2018-11-15 NOTE — TELEPHONE ENCOUNTER
"Patient was seen by Neeta Monge CNP for this on 11/9/18    Per patient, the burning and pain with urination has worsened.   Pain lingers long after she urinates  Urine is yellow in color, \"like a yellow crayon\"  Patient stated that she is drinking plenty of water, \"I always have water by me.. I wake up during the night to drink water too,\" but urine color has not changed, \"it has never been this color before\"  She has a couple of days left of the Keflex  This is her 3rd abx.  Had sepsis in the past from UTI is concerned  Denies fever.    Please advise  She will continue pushing fluids, taking keflex, and monitoring for fever until she hears back    Coral Milner RN      "

## 2018-11-15 NOTE — TELEPHONE ENCOUNTER
Reason for Call:  Other call back    Detailed comments: Patient states medication for UTI is not working; she is worried because it is getting worse and urine color is very dark. She has become septic in the past from UTI's. Please call back with next steps ASAP. Last provider seen was CHELO Monge.    Phone Number Patient can be reached at: Home number on file [phone] (home)    Best Time: ASAP    Can we leave a detailed message on this number? YES    Call taken on 11/15/2018 at 3:04 PM by Hina Foley

## 2018-11-16 NOTE — TELEPHONE ENCOUNTER
Called home number listed but patient was unavailable.  Spoke with Rico.  He will have patient call RN hotline back at [phone]    Called mobile number listed  Left message on mobile voicemail for patient to return call to RN hotline at # [phone].    Coral Milner RN

## 2018-11-16 NOTE — TELEPHONE ENCOUNTER
Patient has been treated with two courses of appropriate antibiotics per the sensitivities from the culture on 10/10/18. Finish the Keflex as prescribed and follow up for lab only appointment to leave repeat UA in 1 week. If symptoms worsen in the meantime, needs to be seen in clinic/Urgent Care.    Neeta Monge, CNP

## 2018-11-16 NOTE — TELEPHONE ENCOUNTER
"Patient notified of providers message as written.  Patient stated \"i cant wait a week I have had a sepsis before\".  Patient agreeable to go to UC today/this weekend.  Her symptoms are not any better and her urine is yellow, more than it normally is.  Patient insists she is drinking enough water so her urine should not be this yellow.  Patient will go to UC and discuss further with provider.  Ann Shafer RN    "

## 2018-11-20 DIAGNOSIS — N30.00 ACUTE CYSTITIS WITHOUT HEMATURIA: ICD-10-CM

## 2018-11-20 LAB
ALBUMIN UR-MCNC: NEGATIVE MG/DL
ALBUMIN UR-MCNC: NORMAL MG/DL
APPEARANCE UR: ABNORMAL
APPEARANCE UR: NORMAL
BACTERIA #/AREA URNS HPF: ABNORMAL /HPF
BILIRUB UR QL STRIP: NEGATIVE
BILIRUB UR QL STRIP: NORMAL
COLOR UR AUTO: NORMAL
COLOR UR AUTO: YELLOW
GLUCOSE UR STRIP-MCNC: NEGATIVE MG/DL
GLUCOSE UR STRIP-MCNC: NORMAL MG/DL
HGB UR QL STRIP: NEGATIVE
HGB UR QL STRIP: NORMAL
HYALINE CASTS #/AREA URNS LPF: ABNORMAL /LPF
KETONES UR STRIP-MCNC: ABNORMAL MG/DL
KETONES UR STRIP-MCNC: NORMAL MG/DL
LEUKOCYTE ESTERASE UR QL STRIP: NEGATIVE
LEUKOCYTE ESTERASE UR QL STRIP: NORMAL
MUCOUS THREADS #/AREA URNS LPF: PRESENT /LPF
NITRATE UR QL: NEGATIVE
NITRATE UR QL: NORMAL
NON-SQ EPI CELLS #/AREA URNS LPF: ABNORMAL /LPF
PH UR STRIP: 6.5 PH (ref 5–7)
PH UR STRIP: NORMAL PH (ref 5–7)
RBC #/AREA URNS AUTO: ABNORMAL /HPF
SOURCE: ABNORMAL
SOURCE: NORMAL
SP GR UR STRIP: 1.02 (ref 1–1.03)
SP GR UR STRIP: NORMAL (ref 1–1.03)
UROBILINOGEN UR STRIP-ACNC: 0.2 EU/DL (ref 0.2–1)
UROBILINOGEN UR STRIP-ACNC: NORMAL EU/DL (ref 0.2–1)
WBC #/AREA URNS AUTO: ABNORMAL /HPF

## 2018-11-20 PROCEDURE — 81001 URINALYSIS AUTO W/SCOPE: CPT | Performed by: NURSE PRACTITIONER

## 2018-11-20 PROCEDURE — 87086 URINE CULTURE/COLONY COUNT: CPT | Performed by: FAMILY MEDICINE

## 2018-11-20 NOTE — LETTER
[facility]  [address] [place] [address]    November 23, 2018    Michela MERCADO Paciortimothy  [address]  [place] [address]          Dear Michela,  Your results are normal.   Enclosed is a copy of your results.     Results for orders placed or performed in visit on 11/20/18   **UA reflex to Microscopic FUTURE 14d   Result Value Ref Range    Color Urine Yellow     Appearance Urine Slightly Cloudy     Glucose Urine Negative NEG^Negative mg/dL    Bilirubin Urine Negative NEG^Negative    Ketones Urine Trace (A) NEG^Negative mg/dL    Specific Gravity Urine 1.020 1.003 - 1.035    Blood Urine Negative NEG^Negative    pH Urine 6.5 5.0 - 7.0 pH    Protein Albumin Urine Negative NEG^Negative mg/dL    Urobilinogen Urine 0.2 0.2 - 1.0 EU/dL    Nitrite Urine Negative NEG^Negative    Leukocyte Esterase Urine Negative NEG^Negative    Source Midstream Urine    Urine Microscopic   Result Value Ref Range    WBC Urine 0 - 5 OTO5^0 - 5 /HPF    RBC Urine 2-5 (A) OTO2^O - 2 /HPF    Hyaline Casts 2-5 (A) OTO2^O - 2 /LPF    Squamous Epithelial /LPF Urine Few FEW^Few /LPF    Bacteria Urine Few (A) NEG^Negative /HPF    Mucous Urine Present (A) NEG^Negative /LPF   UA reflex to Microscopic and Culture   Result Value Ref Range    Color Urine Duplicate request     Appearance Urine Duplicate request     Glucose Urine Duplicate request NEG^Negative mg/dL    Bilirubin Urine Duplicate request NEG^Negative    Ketones Urine Duplicate request NEG^Negative mg/dL    Specific Gravity Urine Duplicate request 1.003 - 1.035    Blood Urine Duplicate request NEG^Negative    pH Urine Duplicate request 5.0 - 7.0 pH    Protein Albumin Urine Duplicate request NEG^Negative mg/dL    Urobilinogen Urine Duplicate request 0.2 - 1.0 EU/dL    Nitrite Urine Duplicate request NEG^Negative    Leukocyte Esterase Urine Duplicate request NEG^Negative    Source Duplicate request    Urine  Culture Aerobic Bacterial   Result Value Ref Range    Specimen Description Midstream Urine     Culture Micro No growth        If you have any questions or concerns, please call myself or my nurse at [phone].      Sincerely,        Desi Najera MD /pb

## 2018-11-23 LAB
BACTERIA SPEC CULT: NO GROWTH
SPECIMEN SOURCE: NORMAL

## 2018-12-03 ENCOUNTER — TELEPHONE (OUTPATIENT)
Dept: FAMILY MEDICINE | Facility: CLINIC | Age: 68
End: 2018-12-03

## 2018-12-03 DIAGNOSIS — K21.9 GERD (GASTROESOPHAGEAL REFLUX DISEASE): ICD-10-CM

## 2018-12-03 DIAGNOSIS — F41.9 ANXIETY: ICD-10-CM

## 2018-12-03 DIAGNOSIS — F33.1 MODERATE EPISODE OF RECURRENT MAJOR DEPRESSIVE DISORDER (H): ICD-10-CM

## 2018-12-03 DIAGNOSIS — F32.1 MODERATE MAJOR DEPRESSION (H): ICD-10-CM

## 2018-12-05 RX ORDER — PANTOPRAZOLE SODIUM 40 MG/1
TABLET, DELAYED RELEASE ORAL
Qty: 180 TABLET | Refills: 1 | Status: SHIPPED | OUTPATIENT
Start: 2018-12-05 | End: 2019-06-18

## 2018-12-05 NOTE — TELEPHONE ENCOUNTER
"Routing refill request to provider for review/approval because:  Labs out of range:  PHQ-9    Requested Prescriptions   Pending Prescriptions Disp Refills     buPROPion (WELLBUTRIN SR) 200 MG 12 hr tablet [Pharmacy Med Name: BuPROPion HCl ER (SR) Oral Tablet Extended Release 12 Hour 200 MG] 180 tablet 0     Sig: TAKE ONE TABLET BY MOUTH TWICE DAILY.    SSRIs Protocol Failed    12/3/2018 12:06 PM       Failed - PHQ-9 score less than 5 in past 6 months    Please review last PHQ-9 score.          Passed - Medication is Bupropion    If the medication is Bupropion (Wellbutrin), and the patient is taking for smoking cessation; OK to refill.         Passed - Patient is age 18 or older       Passed - No active pregnancy on record       Passed - No positive pregnancy test in last 12 months       Passed - Recent (6 mo) or future (30 days) visit within the authorizing provider's specialty    Patient had office visit in the last 6 months or has a visit in the next 30 days with authorizing provider or within the authorizing provider's specialty.  See \"Patient Info\" tab in inbasket, or \"Choose Columns\" in Meds & Orders section of the refill encounter.            sertraline (ZOLOFT) 25 MG tablet [Pharmacy Med Name: Sertraline HCl Oral Tablet 25 MG] 30 tablet 0     Sig: Take 1 tablet (25 mg) by mouth daily    SSRIs Protocol Failed    12/3/2018 12:06 PM       Failed - PHQ-9 score less than 5 in past 6 months    Please review last PHQ-9 score.          Passed - Medication is Bupropion    If the medication is Bupropion (Wellbutrin), and the patient is taking for smoking cessation; OK to refill.         Passed - Patient is age 18 or older       Passed - No active pregnancy on record       Passed - No positive pregnancy test in last 12 months       Passed - Recent (6 mo) or future (30 days) visit within the authorizing provider's specialty    Patient had office visit in the last 6 months or has a visit in the next 30 days with " "authorizing provider or within the authorizing provider's specialty.  See \"Patient Info\" tab in inbasket, or \"Choose Columns\" in Meds & Orders section of the refill encounter.          Signed Prescriptions Disp Refills     pantoprazole (PROTONIX) 40 MG EC tablet 180 tablet 1     Sig: TAKE ONE TABLET BY MOUTH TWICE DAILY 30-60 MINUTES BEFORE A MEAL    PPI Protocol Passed    12/3/2018 12:06 PM       Passed - Not on Clopidogrel (unless Pantoprazole ordered)       Passed - No diagnosis of osteoporosis on record       Passed - Recent (12 mo) or future (30 days) visit within the authorizing provider's specialty    Patient had office visit in the last 12 months or has a visit in the next 30 days with authorizing provider or within the authorizing provider's specialty.  See \"Patient Info\" tab in inbasket, or \"Choose Columns\" in Meds & Orders section of the refill encounter.             Passed - Patient is age 18 or older       Passed - No active pregnacy on record       Passed - No positive pregnancy test in past 12 months        Jeanie Borges RN  "

## 2018-12-06 RX ORDER — BUPROPION HYDROCHLORIDE 200 MG/1
TABLET, EXTENDED RELEASE ORAL
Qty: 180 TABLET | Refills: 0 | Status: SHIPPED | OUTPATIENT
Start: 2018-12-06 | End: 2019-03-14

## 2018-12-06 RX ORDER — SERTRALINE HYDROCHLORIDE 25 MG/1
TABLET, FILM COATED ORAL
Qty: 30 TABLET | Refills: 0 | Status: SHIPPED | OUTPATIENT
Start: 2018-12-06 | End: 2018-12-17

## 2018-12-06 ASSESSMENT — PATIENT HEALTH QUESTIONNAIRE - PHQ9: SUM OF ALL RESPONSES TO PHQ QUESTIONS 1-9: 12

## 2018-12-06 NOTE — TELEPHONE ENCOUNTER
MA- patient due for PHQ9- please complete over the phone    PHQ-9 SCORE 2/19/2018 3/13/2018 4/25/2018   PHQ-9 Total Score - - -   PHQ-9 Total Score 10 16 7       Coral Milner RN

## 2018-12-06 NOTE — TELEPHONE ENCOUNTER
Spoke to patient and PHQ-9 completed. Patient states she does have an appointment 12/10/2018 with provider.  Umu CAMPOVERDE CMA ([facility])

## 2018-12-10 ENCOUNTER — TELEPHONE (OUTPATIENT)
Dept: FAMILY MEDICINE | Facility: CLINIC | Age: 68
End: 2018-12-10

## 2018-12-10 NOTE — TELEPHONE ENCOUNTER
Reason for call: med refill request  Patient called regarding:medication prescription-butropion and sertraline  Additional comments: Patient is low. She could not get here today due to her ride not there yet. She rescheduled for next week as there was not anymore for today or this week    Phone number to reach patient:  [phone]    Best Time:  anytime    Can we leave a detailed message on this number?  YES

## 2018-12-10 NOTE — TELEPHONE ENCOUNTER
See 12-3-18, telephone encounter. Both medications were refilled for one time.  Verified with [facility] that they are ready for .  Patient called and informed. Leidy Fuentes,

## 2018-12-17 ENCOUNTER — OFFICE VISIT (OUTPATIENT)
Dept: FAMILY MEDICINE | Facility: CLINIC | Age: 68
End: 2018-12-17
Payer: COMMERCIAL

## 2018-12-17 VITALS
TEMPERATURE: 98.7 F | HEART RATE: 77 BPM | DIASTOLIC BLOOD PRESSURE: 60 MMHG | BODY MASS INDEX: 26.23 KG/M2 | HEIGHT: 60 IN | WEIGHT: 133.6 LBS | SYSTOLIC BLOOD PRESSURE: 120 MMHG | OXYGEN SATURATION: 98 % | RESPIRATION RATE: 20 BRPM

## 2018-12-17 DIAGNOSIS — F32.1 MODERATE MAJOR DEPRESSION (H): Primary | ICD-10-CM

## 2018-12-17 DIAGNOSIS — Z12.11 SCREEN FOR COLON CANCER: ICD-10-CM

## 2018-12-17 DIAGNOSIS — F41.9 ANXIETY: ICD-10-CM

## 2018-12-17 DIAGNOSIS — Z12.2 ENCOUNTER FOR SCREENING FOR LUNG CANCER: ICD-10-CM

## 2018-12-17 PROCEDURE — 99213 OFFICE O/P EST LOW 20 MIN: CPT | Performed by: NURSE PRACTITIONER

## 2018-12-17 RX ORDER — SERTRALINE HYDROCHLORIDE 25 MG/1
TABLET, FILM COATED ORAL
Qty: 90 TABLET | Refills: 1 | Status: SHIPPED | OUTPATIENT
Start: 2018-12-17 | End: 2019-06-18

## 2018-12-17 ASSESSMENT — MIFFLIN-ST. JEOR: SCORE: 1063.54

## 2018-12-17 ASSESSMENT — PAIN SCALES - GENERAL: PAINLEVEL: NO PAIN (0)

## 2018-12-17 ASSESSMENT — PATIENT HEALTH QUESTIONNAIRE - PHQ9: SUM OF ALL RESPONSES TO PHQ QUESTIONS 1-9: 8

## 2018-12-17 NOTE — PROGRESS NOTES
SUBJECTIVE:   Michela Cole is a 68 year old female who presents to clinic today for the following health issues:      Medication Followup of Sertraline 25 mg    Taking Medication as prescribed: yes    Side Effects:  None    Medication Helping Symptoms:  yes     Patient notes that her mood has improved.  She is no longer tearful all the time and she feels her anxiety has decreased.  She denies any side effects from sertraline.  She denies any thoughts of suicide or self harm.  She feels better overall.  She continues to feel fatigued, but will be undergoing additional testing through cardiology to evaluate her heart further.  She is eating normally.  She denies issues with concentration.  Overall, she feels good at current dose of medication.      Problem list and histories reviewed & adjusted, as indicated.  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     Stress     Restless legs syndrome (RLS)     CAD (coronary artery disease)     Moderate episode of recurrent major depressive disorder (H)     S/P BSO (bilateral salpingo-oophorectomy)     Past Surgical History:   Procedure Laterality Date     APPENDECTOMY       C/SECTION, LOW TRANSVERSE      x 4     CORONARY ANGIOGRAPHY ADULT ORDER      Total of 5 coronary angiograms     HC DRUG-ELUTING STENTS, SINGLE  2014     HYSTERECTOMY TOTAL ABD, JIMENA SALPINGO-OOPHORECTOMY, NODE DISSECTION, TUMOR DEBULKING, COMBINED N/A 4/30/2018    Procedure: COMBINED HYSTERECTOMY TOTAL ABDOMINAL, SALPINGO-OOPHORECTOMY, NODE DISSECTION, TUMOR DEBULKING;;  Surgeon: Melody Bolivar MD;  Location: [facility] OR     LAPAROSCOPIC HYSTERECTOMY TOTAL, JIMENA SALPINGO-OOPHORECTOMY, NODE  "DISSECTION, TUMOR STAGING, COMBINED N/A 2018    Procedure: COMBINED LAPAROSCOPIC HYSTERECTOMY TOTAL, SALPINGO-OOPHORECTOMY, NODE DISSECTION, TUMOR STAGING;  Diagnostic Laparoscopy, Laparotomy, Extensive Lysis of Adhesions, Open Bilateral Salpingo-Oophorectomy, Partial Omentectomy, Anesthesia Block;  Surgeon: Melody Bolivar MD;  Location: [facility] OR     SPLENECTOMY  1978    ITP     TUBAL LIGATION      x2       Social History     Tobacco Use     Smoking status: Former Smoker     Packs/day: 0.25     Years: 42.00     Pack years: 10.50     Types: Cigarettes     Last attempt to quit: 2013     Years since quittin.4     Smokeless tobacco: Never Used   Substance Use Topics     Alcohol use: Yes     Alcohol/week: 3.6 oz     Types: 6 Cans of beer per week     Comment: A couple times a week, \"three beers max\"     Family History   Problem Relation Age of Onset     Cervical Cancer Mother         Lymph nodes were also involved, no chemotherapy needed.       Diabetes Father      Hyperlipidemia Father      Heart Disease Maternal Grandfather      Heart Disease Paternal Grandmother      Heart Disease Paternal Grandfather      Glaucoma No family hx of      Macular Degeneration No family hx of      Hypertension No family hx of      Cerebrovascular Disease No family hx of      Thyroid Disease No family hx of          Current Outpatient Medications   Medication Sig Dispense Refill     albuterol (PROAIR HFA/PROVENTIL HFA/VENTOLIN HFA) 108 (90 BASE) MCG/ACT Inhaler Inhale 2 puffs into the lungs every 6 hours as needed for shortness of breath / dyspnea 2 Inhaler 2     aspirin EC 81 MG tablet Take 81 mg by mouth every morning        buPROPion (WELLBUTRIN SR) 200 MG 12 hr tablet TAKE ONE TABLET BY MOUTH TWICE DAILY. 180 tablet 0     diclofenac (VOLTAREN) 1 % GEL topical gel Apply  2 grams to hands four times daily using enclosed dosing card. 100 g 0     docusate sodium (COLACE) 100 MG tablet Take 100 mg by mouth daily 60 tablet 0     " isosorbide mononitrate (IMDUR) 30 MG 24 hr tablet Take 2 tablets (60 mg) by mouth daily 180 tablet 1     levothyroxine (SYNTHROID/LEVOTHROID) 50 MCG tablet Take 1 tablet (50 mcg) by mouth daily 90 tablet 3     LORazepam (ATIVAN) 0.5 MG tablet TAKE 1/2-1 TABLET BY MOUTH EVERY 8 HOURS AS NEEDED FOR ANXIETY. DO NOT OPERATE A VEHICLE AFTER TAKING THIS MEDICATION 1 tablet 0     metoprolol (TOPROL-XL) 50 MG 24 hr tablet Take 1 tablet (50 mg) by mouth daily (Patient taking differently: Take 50 mg by mouth every morning ) 90 tablet 3     nitroglycerin (NITROSTAT) 0.4 MG sublingual tablet Place 1 tablet (0.4 mg) under the tongue every 5 minutes as needed for chest pain 25 tablet 11     order for DME Equipment being ordered: incentive spirometry 1 Device 0     oxyCODONE-acetaminophen (PERCOCET) 5-325 MG per tablet Take 1 tablet by mouth every 6 hours as needed for pain 15 tablet 0     pantoprazole (PROTONIX) 40 MG EC tablet TAKE ONE TABLET BY MOUTH TWICE DAILY 30-60 MINUTES BEFORE A MEAL 180 tablet 1     pramipexole (MIRAPEX) 0.125 MG tablet Take 1 tablet (0.125 mg) by mouth At Bedtime 90 tablet 1     rosuvastatin (CRESTOR) 20 MG tablet Take 20 mg by mouth       sertraline (ZOLOFT) 25 MG tablet Take 1 tablet (25 mg) by mouth daily 90 tablet 1     simethicone (MYLICON) 80 MG chewable tablet Take 1 tablet (80 mg) by mouth 4 times daily as needed for cramping (gas) 20 tablet 0     umeclidinium-vilanterol (ANORO ELLIPTA) 62.5-25 MCG/INH oral inhaler Inhale 1 puff into the lungs daily 1 Inhaler 5     Allergies   Allergen Reactions     Sulfa Drugs Rash and Hives     Plavix [Clopidogrel] Other (See Comments)     Other reaction(s): Other - Describe In Comment Field  Not effective at preventing clots  P2Y12 testing done at [facility] in 12/2014 indicates a non responder to Plavix     Sulfasalazine Rash     BP Readings from Last 3 Encounters:   12/17/18 120/60   11/09/18 110/66   10/10/18 110/71    Wt Readings from Last 3 Encounters:  "  12/17/18 60.6 kg (133 lb 9.6 oz)   11/09/18 59.4 kg (131 lb)   10/10/18 59 kg (130 lb)                  Labs reviewed in EPIC    Reviewed and updated as needed this visit by clinical staff       Reviewed and updated as needed this visit by Provider         ROS:  Constitutional, HEENT, cardiovascular, pulmonary, gi and gu systems are negative, except as otherwise noted.    OBJECTIVE:     /60   Pulse 77   Temp 98.7  F (37.1  C) (Oral)   Resp 20   Ht 1.518 m (4' 11.75\")   Wt 60.6 kg (133 lb 9.6 oz)   SpO2 98%   BMI 26.31 kg/m    Body mass index is 26.31 kg/m .  GENERAL: healthy, alert and no distress  RESP: lungs clear to auscultation - no rales, rhonchi or wheezes  CV: regular rate and rhythm, normal S1 S2, no S3 or S4, no murmur, click or rub, no peripheral edema and peripheral pulses strong  MS: no gross musculoskeletal defects noted, no edema    Diagnostic Test Results:  none     ASSESSMENT/PLAN:       1. Moderate major depression (H)  Stable.  Continue current treatment plan and medications.    - sertraline (ZOLOFT) 25 MG tablet; Take 1 tablet (25 mg) by mouth daily  Dispense: 90 tablet; Refill: 1    2. Anxiety  Stable.  Continue current treatment plan and medications.    - sertraline (ZOLOFT) 25 MG tablet; Take 1 tablet (25 mg) by mouth daily  Dispense: 90 tablet; Refill: 1    3. Screen for colon cancer    - Fecal colorectal cancer screen FIT; Future    4. Encounter for screening for lung cancer  Patient declines low dose lung cancer screening CT.        FUTURE APPOINTMENTS:       - Follow-up visit in 6 months with PCP.    JOSE Ramsey [facility]    "

## 2018-12-17 NOTE — PATIENT INSTRUCTIONS
[facility]    If you have any questions regarding to your visit please contact your care team:     Team Pink:   Clinic Hours Telephone Number   Internal Medicine:  Dr. Frannie Edwards NP 7am-7pm  Monday - Thursday   7am-5pm  Fridays  [phone]  (Appointment scheduling available 24/7)   Urgent Care - [place] and [facility] - 11am-9pm Monday-Friday Saturday-Sunday- 9am-5pm   [place] - 5pm-9pm Monday-Friday Saturday-Sunday- 9am-5pm  [phone] - [place]  [phone] - [place]       What options do I have for a visit other than an office visit? We offer electronic visits (e-visits) and telephone visits, when medically appropriate.  Please check with your medical insurance to see if these types of visits are covered, as you will be responsible for any charges that are not paid by your insurance.      You can use [employer] (secure electronic communication) to access to your chart, send your primary care provider a message, or make an appointment. Ask a team member how to get started.     For a price quote for your services, please call our Consumer Price Line at [phone] or our Imaging Cost estimation line at [phone] (for imaging tests).  Valentine ABAD CMA

## 2019-02-06 NOTE — ANESTHESIA POSTPROCEDURE EVALUATION
Anesthesia POST Procedure Evaluation    Patient: Michela Cole   MRN:     [id number] Gender:   female   Age:    68 year old :      1950        Preoperative Diagnosis: Bilateral Ovarian Cyst    Procedure(s):  Diagnostic Laparoscopy, Laparotomy, Extensive Lysis of Adhesions, Open Bilateral Salpingo-Oophorectomy, Partial Omentectomy, Anesthesia Block  COMBINED HYSTERECTOMY TOTAL ABDOMINAL, SALPINGO-OOPHORECTOMY, NODE DISSECTION, TUMOR DEBULKING   Postop Comments: No value filed.       Anesthesia Type:  Not documented    Reportable Event: NO     PAIN: Uncomplicated   Sign Out status: Comfortable, Well controlled pain     PONV: No PONV   Sign Out status:  No Nausea or Vomiting     Neuro/Psych: Uneventful perioperative course   Sign Out Status: Preoperative baseline; Age appropriate mentation     Airway/Resp.: Uneventful perioperative course   Sign Out Status: Non labored breathing, age appropriate RR; Resp. Status within EXPECTED Parameters     CV: Uneventful perioperative course   Sign Out status: Appropriate BP and perfusion indices; Appropriate HR/Rhythm     Disposition:   Sign Out in:  PACU  Recovery Course: Uneventful  Follow-Up: Not required           Last Anesthesia Record Vitals:  CRNA VITALS  2018 1520 - 2018 1620      2018             NIBP:  131/88    ART BP:  134/62    Ht Rate:  67    SpO2:  100 %    Resp Rate (observed):  12    EKG:  Sinus rhythm          Last PACU/Preop Vitals:  Vitals:    18 2042 18 0840 18 1148   BP: 136/88 157/89 130/84   Pulse:      Resp:    Temp: 36.9  C (98.4  F) 36.8  C (98.3  F) 37.1  C (98.7  F)   SpO2: 96% 98%          Electronically Signed By: Kirk Patel MD, 2019, 11:58 AM

## 2019-02-07 ENCOUNTER — TRANSFERRED RECORDS (OUTPATIENT)
Dept: HEALTH INFORMATION MANAGEMENT | Facility: CLINIC | Age: 69
End: 2019-02-07

## 2019-03-14 DIAGNOSIS — F33.1 MODERATE EPISODE OF RECURRENT MAJOR DEPRESSIVE DISORDER (H): ICD-10-CM

## 2019-03-15 RX ORDER — BUPROPION HYDROCHLORIDE 200 MG/1
TABLET, EXTENDED RELEASE ORAL
Qty: 180 TABLET | Refills: 0 | Status: SHIPPED | OUTPATIENT
Start: 2019-03-15 | End: 2019-06-18

## 2019-03-19 DIAGNOSIS — F33.1 MODERATE EPISODE OF RECURRENT MAJOR DEPRESSIVE DISORDER (H): ICD-10-CM

## 2019-03-19 RX ORDER — BUPROPION HYDROCHLORIDE 200 MG/1
200 TABLET, EXTENDED RELEASE ORAL 2 TIMES DAILY
Qty: 180 TABLET | Refills: 0 | Status: CANCELLED | OUTPATIENT
Start: 2019-03-19

## 2019-03-19 NOTE — TELEPHONE ENCOUNTER
Please call to confirm if this is duplicate request. Thanks.    Umu Reid RN  [facility]

## 2019-03-19 NOTE — TELEPHONE ENCOUNTER
Reason for call:  Medication     If this is a refill request, has the caller requested the refill from the pharmacy already? Yes     Will the patient be using a [facility]? No     Name of the pharmacy and phone number for the current request: [facility] [phone]    Name of the medication requested: buPROPion (WELLBUTRIN SR) 200 MG 12 hr tablet    Other request: na    Phone number to reach patient:  Home number on file [phone] (home)    Best Time:  any    Can we leave a detailed message on this number?  YES

## 2019-03-20 NOTE — TELEPHONE ENCOUNTER
Pharmacy closed. Please call after 9 to confirm duplicate.    buPROPion (WELLBUTRIN SR) 200 MG 12 hr tablet 180 tablet 0 3/15/2019  No   Sig: TAKE ONE TABLET BY MOUTH TWICE DAILY.   Sent to pharmacy as: buPROPion (WELLBUTRIN SR) 200 MG 12 hr tablet   Class: E-Prescribe   Order: [id number]   E-Prescribing Status: Receipt confirmed by pharmacy (3/15/2019 12:16 PM CDT)     Umu CAMPOVERDE CMA ([facility])

## 2019-03-20 NOTE — TELEPHONE ENCOUNTER
Spoke to pharmacy and confirmed duplicate request.  Umu CAMPOVERDE CMA ([facility])

## 2019-06-18 DIAGNOSIS — F33.1 MODERATE EPISODE OF RECURRENT MAJOR DEPRESSIVE DISORDER (H): ICD-10-CM

## 2019-06-18 DIAGNOSIS — K21.9 GERD (GASTROESOPHAGEAL REFLUX DISEASE): ICD-10-CM

## 2019-06-18 DIAGNOSIS — F41.9 ANXIETY: ICD-10-CM

## 2019-06-18 DIAGNOSIS — F32.1 MODERATE MAJOR DEPRESSION (H): ICD-10-CM

## 2019-06-21 RX ORDER — SERTRALINE HYDROCHLORIDE 25 MG/1
TABLET, FILM COATED ORAL
Qty: 30 TABLET | Refills: 0 | Status: SHIPPED | OUTPATIENT
Start: 2019-06-21 | End: 2019-07-25

## 2019-06-21 RX ORDER — BUPROPION HYDROCHLORIDE 200 MG/1
200 TABLET, EXTENDED RELEASE ORAL 2 TIMES DAILY
Qty: 60 TABLET | Refills: 0 | Status: SHIPPED | OUTPATIENT
Start: 2019-06-21 | End: 2019-07-25

## 2019-06-21 RX ORDER — PANTOPRAZOLE SODIUM 40 MG/1
TABLET, DELAYED RELEASE ORAL
Qty: 180 TABLET | Refills: 0 | Status: SHIPPED | OUTPATIENT
Start: 2019-06-21 | End: 2019-07-25

## 2019-06-25 DIAGNOSIS — F41.9 ANXIETY: ICD-10-CM

## 2019-06-25 DIAGNOSIS — K21.9 GERD (GASTROESOPHAGEAL REFLUX DISEASE): ICD-10-CM

## 2019-06-25 DIAGNOSIS — F32.1 MODERATE MAJOR DEPRESSION (H): ICD-10-CM

## 2019-06-26 RX ORDER — PANTOPRAZOLE SODIUM 40 MG/1
TABLET, DELAYED RELEASE ORAL
Qty: 180 TABLET | Refills: 0
Start: 2019-06-26

## 2019-06-26 RX ORDER — SERTRALINE HYDROCHLORIDE 25 MG/1
TABLET, FILM COATED ORAL
Qty: 90 TABLET | Refills: 0
Start: 2019-06-26

## 2019-07-25 ENCOUNTER — OFFICE VISIT (OUTPATIENT)
Dept: FAMILY MEDICINE | Facility: CLINIC | Age: 69
End: 2019-07-25
Payer: COMMERCIAL

## 2019-07-25 VITALS
RESPIRATION RATE: 16 BRPM | BODY MASS INDEX: 26.7 KG/M2 | HEART RATE: 66 BPM | WEIGHT: 136 LBS | TEMPERATURE: 97.5 F | DIASTOLIC BLOOD PRESSURE: 62 MMHG | HEIGHT: 60 IN | OXYGEN SATURATION: 97 % | SYSTOLIC BLOOD PRESSURE: 112 MMHG

## 2019-07-25 DIAGNOSIS — K21.9 GASTROESOPHAGEAL REFLUX DISEASE, ESOPHAGITIS PRESENCE NOT SPECIFIED: ICD-10-CM

## 2019-07-25 DIAGNOSIS — F33.1 MODERATE EPISODE OF RECURRENT MAJOR DEPRESSIVE DISORDER (H): ICD-10-CM

## 2019-07-25 DIAGNOSIS — F41.9 ANXIETY: ICD-10-CM

## 2019-07-25 DIAGNOSIS — G25.81 RESTLESS LEGS SYNDROME (RLS): ICD-10-CM

## 2019-07-25 DIAGNOSIS — J44.9 CHRONIC OBSTRUCTIVE PULMONARY DISEASE, UNSPECIFIED COPD TYPE (H): ICD-10-CM

## 2019-07-25 DIAGNOSIS — Z00.00 PREVENTATIVE HEALTH CARE: ICD-10-CM

## 2019-07-25 DIAGNOSIS — E03.9 HYPOTHYROIDISM, UNSPECIFIED TYPE: ICD-10-CM

## 2019-07-25 DIAGNOSIS — Z78.0 ASYMPTOMATIC POSTMENOPAUSAL STATUS: ICD-10-CM

## 2019-07-25 DIAGNOSIS — Z12.11 SPECIAL SCREENING FOR MALIGNANT NEOPLASMS, COLON: ICD-10-CM

## 2019-07-25 LAB
CHOLEST SERPL-MCNC: 167 MG/DL
FERRITIN SERPL-MCNC: 58 NG/ML (ref 8–252)
GLUCOSE SERPL-MCNC: 111 MG/DL (ref 70–99)
HDLC SERPL-MCNC: 69 MG/DL
LDLC SERPL CALC-MCNC: 70 MG/DL
NONHDLC SERPL-MCNC: 98 MG/DL
TRIGL SERPL-MCNC: 139 MG/DL
TSH SERPL DL<=0.005 MIU/L-ACNC: 3.78 MU/L (ref 0.4–4)
VIT B12 SERPL-MCNC: 476 PG/ML (ref 193–986)

## 2019-07-25 PROCEDURE — 84443 ASSAY THYROID STIM HORMONE: CPT | Performed by: FAMILY MEDICINE

## 2019-07-25 PROCEDURE — 36415 COLL VENOUS BLD VENIPUNCTURE: CPT | Performed by: FAMILY MEDICINE

## 2019-07-25 PROCEDURE — 82728 ASSAY OF FERRITIN: CPT | Performed by: FAMILY MEDICINE

## 2019-07-25 PROCEDURE — 99214 OFFICE O/P EST MOD 30 MIN: CPT | Performed by: FAMILY MEDICINE

## 2019-07-25 PROCEDURE — 80061 LIPID PANEL: CPT | Performed by: FAMILY MEDICINE

## 2019-07-25 PROCEDURE — 82947 ASSAY GLUCOSE BLOOD QUANT: CPT | Performed by: FAMILY MEDICINE

## 2019-07-25 PROCEDURE — 82607 VITAMIN B-12: CPT | Performed by: FAMILY MEDICINE

## 2019-07-25 RX ORDER — PANTOPRAZOLE SODIUM 40 MG/1
40 TABLET, DELAYED RELEASE ORAL DAILY
Qty: 180 TABLET | Refills: 0 | Status: SHIPPED | OUTPATIENT
Start: 2019-07-25 | End: 2019-10-21

## 2019-07-25 RX ORDER — PRAMIPEXOLE DIHYDROCHLORIDE 0.12 MG/1
0.12 TABLET ORAL AT BEDTIME
Qty: 90 TABLET | Refills: 3 | Status: SHIPPED | OUTPATIENT
Start: 2019-07-25 | End: 2019-10-21

## 2019-07-25 RX ORDER — BUPROPION HYDROCHLORIDE 200 MG/1
200 TABLET, EXTENDED RELEASE ORAL 2 TIMES DAILY
Qty: 60 TABLET | Refills: 2 | Status: SHIPPED | OUTPATIENT
Start: 2019-07-25 | End: 2019-10-21

## 2019-07-25 RX ORDER — ALBUTEROL SULFATE 90 UG/1
2 AEROSOL, METERED RESPIRATORY (INHALATION) EVERY 6 HOURS PRN
Qty: 2 INHALER | Refills: 2 | Status: SHIPPED | OUTPATIENT
Start: 2019-07-25 | End: 2020-09-15

## 2019-07-25 RX ORDER — LEVOTHYROXINE SODIUM 50 UG/1
50 TABLET ORAL DAILY
Qty: 90 TABLET | Refills: 3 | Status: SHIPPED | OUTPATIENT
Start: 2019-07-25 | End: 2020-08-20

## 2019-07-25 RX ORDER — SERTRALINE HYDROCHLORIDE 25 MG/1
TABLET, FILM COATED ORAL
Qty: 30 TABLET | Refills: 3 | Status: SHIPPED | OUTPATIENT
Start: 2019-07-25 | End: 2019-10-21

## 2019-07-25 ASSESSMENT — ANXIETY QUESTIONNAIRES
7. FEELING AFRAID AS IF SOMETHING AWFUL MIGHT HAPPEN: NEARLY EVERY DAY
IF YOU CHECKED OFF ANY PROBLEMS ON THIS QUESTIONNAIRE, HOW DIFFICULT HAVE THESE PROBLEMS MADE IT FOR YOU TO DO YOUR WORK, TAKE CARE OF THINGS AT HOME, OR GET ALONG WITH OTHER PEOPLE: SOMEWHAT DIFFICULT
1. FEELING NERVOUS, ANXIOUS, OR ON EDGE: MORE THAN HALF THE DAYS
GAD7 TOTAL SCORE: 17
5. BEING SO RESTLESS THAT IT IS HARD TO SIT STILL: MORE THAN HALF THE DAYS
3. WORRYING TOO MUCH ABOUT DIFFERENT THINGS: NEARLY EVERY DAY
6. BECOMING EASILY ANNOYED OR IRRITABLE: SEVERAL DAYS
2. NOT BEING ABLE TO STOP OR CONTROL WORRYING: NEARLY EVERY DAY

## 2019-07-25 ASSESSMENT — MIFFLIN-ST. JEOR: SCORE: 1074.42

## 2019-07-25 ASSESSMENT — PATIENT HEALTH QUESTIONNAIRE - PHQ9
5. POOR APPETITE OR OVEREATING: NEARLY EVERY DAY
SUM OF ALL RESPONSES TO PHQ QUESTIONS 1-9: 6

## 2019-07-25 NOTE — PROGRESS NOTES
"Laly Cole is a 68 year old female who presents to clinic today for the following health issues:    HPI   Anxiety Follow-Up    How are you doing with your anxiety since your last visit? Worsened     Are you having other symptoms that might be associated with anxiety? No    Have you had a significant life event? OTHER: kids and drugs     Are you feeling depressed? Yes:  states the medication is helping.    Do you have any concerns with your use of alcohol or other drugs? No    Social History     Tobacco Use     Smoking status: Former Smoker     Packs/day: 0.25     Years: 42.00     Pack years: 10.50     Types: Cigarettes     Last attempt to quit: 2013     Years since quittin.0     Smokeless tobacco: Never Used   Substance Use Topics     Alcohol use: Yes     Alcohol/week: 3.6 oz     Types: 6 Cans of beer per week     Comment: A couple times a week, \"three beers max\"     Drug use: No     JACKELINE-7 SCORE 2017 3/13/2018 2019   Total Score 19 17 17     PHQ 2018   PHQ-9 Total Score 12 8 6   Q9: Thoughts of better off dead/self-harm past 2 weeks Not at all Not at all Not at all     Hyperlipidemia Follow-Up      Are you having any of the following symptoms? (Select all that apply)  No complaints of shortness of breath, chest pain or pressure.  No increased sweating or nausea with activity.  No left-sided neck or arm pain.  No complaints of pain in calves when walking 1-2 blocks.    Are you regularly taking any medication or supplement to lower your cholesterol?   Yes- statins    Are you having muscle aches or other side effects that you think could be caused by your cholesterol lowering medication?  No      Vascular Disease Follow-up      Are you having any of the following symptoms? (Select all that apply) No complaints of shortness of breath, chest pain or pressure.  No increased sweating or nausea with activity.  No left-sided neck or arm pain.  No " complaints of pain in calves when walking 1-2 blocks.    How often do you take nitroglycerin? Never    Do you take an aspirin every day? Yes    COPD Follow-Up    Overall, how are your COPD symptoms since your last clinic visit?  No change    How much fatigue or shortness of breath do you have when you are walking?  Same as usual    How much shortness of breath do you have when you are resting?  Same as usual    How often do you cough? Rarely    Have you noticed any change in your sputum/phlegm?  No    Have you experienced a recent fever? No    Please describe how far you can walk without stopping to rest:  The length of 1-2 rooms    How many flights of stairs are you able to walk up without stopping?  1    Have you had any Emergency Room Visits, Urgent Care Visits, or Hospital Admissions because of your COPD since your last office visit?  No    History   Smoking Status     Former Smoker     Packs/day: 0.25     Years: 42.00     Types: Cigarettes     Quit date: 6/24/2013   Smokeless Tobacco     Never Used     Lab Results   Component Value Date    FEV1 90 09/26/2014    IRX4JIW 86 09/26/2014         Amount of exercise or physical activity: None    Problems taking medications regularly: No    Medication side effects: none    Diet: low fat/cholesterol           Amount of exercise or physical activity: None    Problems taking medications regularly: No    Medication side effects: none    Diet: regular (no restrictions)    1. Patient states she has been having quite a bit of pain in her legs and would like to discuss.    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     Stress     Restless legs syndrome (RLS)     CAD (coronary artery disease)      "Moderate episode of recurrent major depressive disorder (H)     S/P BSO (bilateral salpingo-oophorectomy)     Past Surgical History:   Procedure Laterality Date     APPENDECTOMY       C/SECTION, LOW TRANSVERSE      x 4     CORONARY ANGIOGRAPHY ADULT ORDER      Total of 5 coronary angiograms     HC DRUG-ELUTING STENTS, SINGLE       HYSTERECTOMY TOTAL ABD, JIMENA SALPINGO-OOPHORECTOMY, NODE DISSECTION, TUMOR DEBULKING, COMBINED N/A 2018    Procedure: COMBINED HYSTERECTOMY TOTAL ABDOMINAL, SALPINGO-OOPHORECTOMY, NODE DISSECTION, TUMOR DEBULKING;;  Surgeon: Melody Bolivar MD;  Location: [facility] OR     LAPAROSCOPIC HYSTERECTOMY TOTAL, JIMENA SALPINGO-OOPHORECTOMY, NODE DISSECTION, TUMOR STAGING, COMBINED N/A 2018    Procedure: COMBINED LAPAROSCOPIC HYSTERECTOMY TOTAL, SALPINGO-OOPHORECTOMY, NODE DISSECTION, TUMOR STAGING;  Diagnostic Laparoscopy, Laparotomy, Extensive Lysis of Adhesions, Open Bilateral Salpingo-Oophorectomy, Partial Omentectomy, Anesthesia Block;  Surgeon: Melody Bolivar MD;  Location: [facility] OR     SPLENECTOMY  1978    ITP     TUBAL LIGATION      x2       Social History     Tobacco Use     Smoking status: Former Smoker     Packs/day: 0.25     Years: 42.00     Pack years: 10.50     Types: Cigarettes     Last attempt to quit: 2013     Years since quittin.0     Smokeless tobacco: Never Used   Substance Use Topics     Alcohol use: Yes     Alcohol/week: 3.6 oz     Types: 6 Cans of beer per week     Comment: A couple times a week, \"three beers max\"     Family History   Problem Relation Age of Onset     Cervical Cancer Mother         Lymph nodes were also involved, no chemotherapy needed.       Diabetes Father      Hyperlipidemia Father      Heart Disease Maternal Grandfather      Heart Disease Paternal Grandmother      Heart Disease Paternal Grandfather      Glaucoma No family hx of      Macular Degeneration No family hx of      Hypertension No family hx of      Cerebrovascular Disease No family hx " of      Thyroid Disease No family hx of          Current Outpatient Medications   Medication Sig Dispense Refill     albuterol (PROAIR HFA/PROVENTIL HFA/VENTOLIN HFA) 108 (90 Base) MCG/ACT inhaler Inhale 2 puffs into the lungs every 6 hours as needed for shortness of breath / dyspnea 2 Inhaler 2     aspirin EC 81 MG tablet Take 81 mg by mouth every morning        buPROPion (WELLBUTRIN SR) 200 MG 12 hr tablet Take 1 tablet (200 mg) by mouth 2 times daily Need to see MD for further refills 60 tablet 2     diclofenac (VOLTAREN) 1 % GEL topical gel Apply  2 grams to hands four times daily using enclosed dosing card. 100 g 0     isosorbide mononitrate (IMDUR) 30 MG 24 hr tablet Take 2 tablets (60 mg) by mouth daily 180 tablet 1     levothyroxine (SYNTHROID/LEVOTHROID) 50 MCG tablet Take 1 tablet (50 mcg) by mouth daily 90 tablet 3     LORazepam (ATIVAN) 0.5 MG tablet TAKE 1/2-1 TABLET BY MOUTH EVERY 8 HOURS AS NEEDED FOR ANXIETY. DO NOT OPERATE A VEHICLE AFTER TAKING THIS MEDICATION 1 tablet 0     metoprolol (TOPROL-XL) 50 MG 24 hr tablet Take 1 tablet (50 mg) by mouth daily (Patient taking differently: Take 50 mg by mouth every morning ) 90 tablet 3     nitroglycerin (NITROSTAT) 0.4 MG sublingual tablet Place 1 tablet (0.4 mg) under the tongue every 5 minutes as needed for chest pain 25 tablet 11     oxyCODONE-acetaminophen (PERCOCET) 5-325 MG per tablet Take 1 tablet by mouth every 6 hours as needed for pain 15 tablet 0     pantoprazole (PROTONIX) 40 MG EC tablet Take 1 tablet (40 mg) by mouth daily 180 tablet 0     pramipexole (MIRAPEX) 0.125 MG tablet Take 1 tablet (0.125 mg) by mouth At Bedtime 90 tablet 3     rosuvastatin (CRESTOR) 20 MG tablet Take 20 mg by mouth       sertraline (ZOLOFT) 25 MG tablet Take 1 tablet (25 mg) by mouth daily Need to see MD for further refills 30 tablet 3     umeclidinium-vilanterol (ANORO ELLIPTA) 62.5-25 MCG/INH oral inhaler Inhale 1 puff into the lungs daily 1 Inhaler 5     docusate  sodium (COLACE) 100 MG tablet Take 100 mg by mouth daily (Patient not taking: Reported on 7/25/2019) 60 tablet 0     order for DME Equipment being ordered: incentive spirometry (Patient not taking: Reported on 7/25/2019) 1 Device 0     simethicone (MYLICON) 80 MG chewable tablet Take 1 tablet (80 mg) by mouth 4 times daily as needed for cramping (gas) (Patient not taking: Reported on 7/25/2019) 20 tablet 0     Allergies   Allergen Reactions     Sulfa Drugs Rash and Hives     Plavix [Clopidogrel] Other (See Comments)     Other reaction(s): Other - Describe In Comment Field  Not effective at preventing clots  P2Y12 testing done at [facility] in 12/2014 indicates a non responder to Plavix     Sulfasalazine Rash     Recent Labs   Lab Test 08/01/18  1103 05/05/18  0524 05/01/18  0518 04/30/18  1845  03/13/18  1446 08/29/17  1342 04/03/17  1726 04/03/17  0845  10/31/15  08/20/14  04/28/14  0956   LDL  --   --   --   --   --  96  --   --  85  --   --   --  113  --  94   HDL  --   --   --   --   --   --   --   --  84  --   --   --  66  --  87   TRIG  --   --   --   --   --   --   --   --  106  --   --   --  83  --  61   ALT  --   --   --   --   --   --  25 27  --   --  18   < >  --    < > 34   CR  --  0.74 0.94 0.82   < >  --  0.98 1.18*  --    < > 0.87   < >  --    < >  --    GFRESTIMATED  --  79 60* 70   < >  --  57* 46*  --    < >  --    < >  --    < >  --    GFRESTBLACK  --  >90 73 84   < >  --  69 56*  --    < >  --    < >  --    < >  --    POTASSIUM  --   --  3.9 3.8   < >  --  4.9 4.2  --    < > 3.8   < >  --    < >  --    TSH 2.22  --   --   --   --  0.32*  --  3.37  --    < >  --    < >  --    < >  --     < > = values in this interval not displayed.      BP Readings from Last 3 Encounters:   07/25/19 112/62   12/17/18 120/60   11/09/18 110/66    Wt Readings from Last 3 Encounters:   07/25/19 61.7 kg (136 lb)   12/17/18 60.6 kg (133 lb 9.6 oz)   11/09/18 59.4 kg (131 lb)                      Reviewed and updated as  "needed this visit by Provider     GERD is stable   The patient denies abdominal or flank pain, anorexia, nausea or vomiting, dysphagia, change in bowel habits or black or bloody stools.  Doing well on Thyroid meds    Review of Systems   ROS COMP: CONSTITUTIONAL: NEGATIVE for fever, chills, change in weight  ENT/MOUTH: NEGATIVE for ear, mouth and throat problems  RESP: NEGATIVE for significant cough or SOB  CV: NEGATIVE for chest pain, palpitations or peripheral edema  GI: NEGATIVE for nausea, abdominal pain, heartburn, or change in bowel habits  MUSCULOSKELETAL: NEGATIVE for significant arthralgias or myalgia  ROS otherwise negative      Objective    /62   Pulse 66   Temp 97.5  F (36.4  C) (Oral)   Resp 16   Ht 1.518 m (4' 11.75\")   Wt 61.7 kg (136 lb)   SpO2 97%   Breastfeeding? No   BMI 26.78 kg/m    Body mass index is 26.78 kg/m .  Physical Exam   GENERAL: healthy, alert and no distress  EYES: Eyes grossly normal to inspection, PERRL and conjunctivae and sclerae normal  NECK: no adenopathy, no asymmetry, masses, or scars and thyroid normal to palpation  RESP: lungs clear to auscultation - no rales, rhonchi or wheezes  CV: regular rate and rhythm, normal S1 S2, no S3 or S4, no murmur, click or rub, no peripheral edema and peripheral pulses strong  ABDOMEN: soft, nontender, no hepatosplenomegaly, no masses and bowel sounds normal  MS: no gross musculoskeletal defects noted, no edema  PSYCH: anxious    Diagnostic Test Results:  Labs reviewed in Epic  Pending         Assessment & Plan     1. Moderate episode of recurrent major depressive disorder (H)  Pt does not want to change meds  Refill done  Also on zoloft  Follow up PMD 3 months  - buPROPion (WELLBUTRIN SR) 200 MG 12 hr tablet; Take 1 tablet (200 mg) by mouth 2 times daily Need to see MD for further refills  Dispense: 60 tablet; Refill: 2    2. Anxiety  Stable  On wellbutrin and this  Does not want to change meds  Stress at Home  Both son and " "daughter use Methamphetamine  And have been in Prison  She takes care of 7 Grandkids  Says she is Fine when on her own  Gets stresses when her Kids visit  Does not want to change meds  - sertraline (ZOLOFT) 25 MG tablet; Take 1 tablet (25 mg) by mouth daily Need to see MD for further refills  Dispense: 30 tablet; Refill: 3    3. Chronic obstructive pulmonary disease, unspecified COPD type (H)  Stable   - umeclidinium-vilanterol (ANORO ELLIPTA) 62.5-25 MCG/INH oral inhaler; Inhale 1 puff into the lungs daily  Dispense: 1 Inhaler; Refill: 5  - albuterol (PROAIR HFA/PROVENTIL HFA/VENTOLIN HFA) 108 (90 Base) MCG/ACT inhaler; Inhale 2 puffs into the lungs every 6 hours as needed for shortness of breath / dyspnea  Dispense: 2 Inhaler; Refill: 2    4. Restless legs syndrome (RLS)  Gets Leg cramps Bilaterally  - Vitamin B12  - pramipexole (MIRAPEX) 0.125 MG tablet; Take 1 tablet (0.125 mg) by mouth At Bedtime  Dispense: 90 tablet; Refill: 3    5. Hypothyroidism, unspecified type  Labs pending   - TSH WITH FREE T4 REFLEX  - levothyroxine (SYNTHROID/LEVOTHROID) 50 MCG tablet; Take 1 tablet (50 mcg) by mouth daily  Dispense: 90 tablet; Refill: 3    6. Gastroesophageal reflux disease, esophagitis presence not specified  Stable   - pantoprazole (PROTONIX) 40 MG EC tablet; Take 1 tablet (40 mg) by mouth daily  Dispense: 180 tablet; Refill: 0    7. Asymptomatic postmenopausal status  Advised   - DEXA HIP/PELVIS/SPINE - Future; Future    8. Preventative health care  Advised   - OPTOMETRY REFERRAL  - Glucose  - Ferritin    9. Special screening for malignant neoplasms, colon  Advised colonoscopy or  - Fecal colorectal cancer screen (FIT); Future     BMI:   Estimated body mass index is 26.78 kg/m  as calculated from the following:    Height as of this encounter: 1.518 m (4' 11.75\").    Weight as of this encounter: 61.7 kg (136 lb).     Return in about 2 months (around 9/25/2019) for Medicare wellness Exam, Physical Exam.    Heidy" MD [place]  [facility] [place]

## 2019-07-25 NOTE — LETTER
[address] [place] [address]    July 26, 2019    Michela Cole  [address] [facility] [id number]    Dear Michela,    Your lab test are Good   Normal B12   Normal Iron level   Normal Thyroid test   Your cholesterol results are normal.   Blood sugar is borderline high   Please watch diet   Your glucose is borderline high. Please watch your diet-low calorie/low carb foods    Enclosed is a copy of your results.     Results for orders placed or performed in visit on 07/25/19   Lipid panel reflex to direct LDL Fasting   Result Value Ref Range    Cholesterol 167 <200 mg/dL    Triglycerides 139 <150 mg/dL    HDL Cholesterol 69 >49 mg/dL    LDL Cholesterol Calculated 70 <100 mg/dL    Non HDL Cholesterol 98 <130 mg/dL   TSH WITH FREE T4 REFLEX   Result Value Ref Range    TSH 3.78 0.40 - 4.00 mU/L   Glucose   Result Value Ref Range    Glucose 111 (H) 70 - 99 mg/dL   Ferritin   Result Value Ref Range    Ferritin 58 8 - 252 ng/mL   Vitamin B12   Result Value Ref Range    Vitamin B12 476 193 - 986 pg/mL   If you have any questions or concerns, please call myself or my nurse at [phone].  Sincerely,        Heidy Weir MD/nena

## 2019-07-26 ASSESSMENT — ANXIETY QUESTIONNAIRES: GAD7 TOTAL SCORE: 17

## 2019-07-29 PROCEDURE — 82274 ASSAY TEST FOR BLOOD FECAL: CPT | Performed by: FAMILY MEDICINE

## 2019-07-31 ENCOUNTER — ANCILLARY PROCEDURE (OUTPATIENT)
Dept: BONE DENSITY | Facility: CLINIC | Age: 69
End: 2019-07-31
Attending: FAMILY MEDICINE
Payer: COMMERCIAL

## 2019-07-31 DIAGNOSIS — Z78.0 ASYMPTOMATIC POSTMENOPAUSAL STATUS: ICD-10-CM

## 2019-07-31 PROCEDURE — 77080 DXA BONE DENSITY AXIAL: CPT | Performed by: INTERNAL MEDICINE

## 2019-08-01 ENCOUNTER — TELEPHONE (OUTPATIENT)
Dept: FAMILY MEDICINE | Facility: CLINIC | Age: 69
End: 2019-08-01

## 2019-08-01 DIAGNOSIS — Z12.11 SCREEN FOR COLON CANCER: Primary | ICD-10-CM

## 2019-08-01 NOTE — LETTER
August 1, 2019        Michela Cole  [address] [place] [address]      Dear Michela Waite CNP reviewed your medical record and found that you are due for colorectal cancer screening. Having regular screenings helps detect cancer early.     You have received a FIT test (Fecal Immunochemical Test) via mail or at your last visit with us, for you to complete and mail back to us. Your Doctor is requesting you complete this for your colorectal cancer screening.   This test looks for blood in your stool and is done once a year. Your kit was mailed with detailed instructions on how to complete the testing. If you did not receive your FIT test or need to request another one, please let us know.    Another option for colorectal cancer screening is a colonoscopy test. A colonoscopy is a procedure that is done to look at the whole colon. You are given medicine to clean out your bowels. You are given an IV and sedation, and they put a thin flexible tube in the rectum to look at the whole colon. This test is done every 10 years if it s normal. If you would like to schedule a colonoscopy for your colorectal cancer screening, please call us at [phone] or send us a message via [employer].       If you have had a colorectal cancer screening done outside of [place] please let us know so we can update your medical record.      Please let us know if you have any questions.    Sincerely,  [facility]

## 2019-08-01 NOTE — TELEPHONE ENCOUNTER
Patient due for fit test. Last fit test: 04/04/2017. Please sign order if appropriate.  Jamila Ryan CMA

## 2019-08-02 ENCOUNTER — TELEPHONE (OUTPATIENT)
Dept: FAMILY MEDICINE | Facility: CLINIC | Age: 69
End: 2019-08-02

## 2019-08-02 DIAGNOSIS — Z12.11 SPECIAL SCREENING FOR MALIGNANT NEOPLASMS, COLON: ICD-10-CM

## 2019-08-02 LAB — HEMOCCULT STL QL IA: NEGATIVE

## 2019-08-02 NOTE — TELEPHONE ENCOUNTER
Notes recorded by Jeanie Lee RN on 8/2/2019 at 8:56 AM CDT  Left detailed message for patient per OK in demographics section.   Advised to call RN hotline [phone] if questions.   See TE.     Jeanei Lee RN  ------    Notes recorded by Heidy Weir MD on 8/1/2019 at 3:08 PM CDT  The bone density test shows osteopenia. This is an intermediate category that is in between normal and osteoporosis.  People with osteopenia should work on taking in 2605-4290 mg of calcium with vitamin D daily. They should also be getting daily weight bearing exercise     You can consider medicines  Please discuss with me next visit    Jeanie Lee RN

## 2019-08-05 DIAGNOSIS — I25.84 CORONARY ATHEROSCLEROSIS DUE TO SEVERELY CALCIFIED CORONARY LESION: ICD-10-CM

## 2019-08-05 DIAGNOSIS — I25.10 CORONARY ATHEROSCLEROSIS OF NATIVE CORONARY ARTERY: Primary | ICD-10-CM

## 2019-08-05 NOTE — TELEPHONE ENCOUNTER
Called and spoke with patient and gave results. Verbalized understanding. Patient wanted to know if osteopenia could cause leg pain as she has been having R leg pain for the past month.   Reports the pain goes from her R knee down to her ankle and pain is pretty constant and when she bends her knee, her knee, especially the back of her knee, hurts and it's hard to straighten it.   Patient denies swelling, redness, or warmth, N/T.   Patient currently taking ASA 81 mg daily.   Advised patient she should be seen due to new leg pain and attempted to schedule patient for appointment but she states she will see how it goes for the next few days and if pain continues, she will call back to schedule.     Jeanie Lee RN

## 2019-08-27 ENCOUNTER — OFFICE VISIT (OUTPATIENT)
Dept: FAMILY MEDICINE | Facility: CLINIC | Age: 69
End: 2019-08-27
Payer: COMMERCIAL

## 2019-08-27 VITALS
OXYGEN SATURATION: 97 % | BODY MASS INDEX: 26.7 KG/M2 | RESPIRATION RATE: 16 BRPM | SYSTOLIC BLOOD PRESSURE: 102 MMHG | HEIGHT: 60 IN | TEMPERATURE: 97.6 F | HEART RATE: 80 BPM | DIASTOLIC BLOOD PRESSURE: 68 MMHG | WEIGHT: 136 LBS

## 2019-08-27 DIAGNOSIS — K21.9 GASTROESOPHAGEAL REFLUX DISEASE, ESOPHAGITIS PRESENCE NOT SPECIFIED: ICD-10-CM

## 2019-08-27 DIAGNOSIS — K43.2 INCISIONAL HERNIA, WITHOUT OBSTRUCTION OR GANGRENE: Primary | ICD-10-CM

## 2019-08-27 PROCEDURE — 99214 OFFICE O/P EST MOD 30 MIN: CPT | Performed by: FAMILY MEDICINE

## 2019-08-27 RX ORDER — BUPROPION HYDROCHLORIDE 200 MG/1
200 TABLET, EXTENDED RELEASE ORAL 2 TIMES DAILY
Qty: 60 TABLET | Refills: 2 | Status: CANCELLED | OUTPATIENT
Start: 2019-08-27

## 2019-08-27 RX ORDER — SERTRALINE HYDROCHLORIDE 25 MG/1
TABLET, FILM COATED ORAL
Qty: 30 TABLET | Refills: 3 | Status: CANCELLED | OUTPATIENT
Start: 2019-08-27

## 2019-08-27 ASSESSMENT — MIFFLIN-ST. JEOR: SCORE: 1074.42

## 2019-08-27 NOTE — PROGRESS NOTES
Laly Cole is a 68 year old female who presents to clinic today for the following health issues:    HPI     Patient states in the past month she has noticed more burning sensation in the upper gastric region. She states she has a small hernia there and is unsure if whether the pain and nausea are associated with that.    Feels a Little bulge  Has had some nausea  For the last month  Has had some burning epigastric area  The patient denies  vomiting, dysphagia, change in bowel habits or black or bloody stools.  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     Stress     Restless legs syndrome (RLS)     CAD (coronary artery disease)     Moderate episode of recurrent major depressive disorder (H)     S/P BSO (bilateral salpingo-oophorectomy)     Past Surgical History:   Procedure Laterality Date     APPENDECTOMY       C/SECTION, LOW TRANSVERSE      x 4     CORONARY ANGIOGRAPHY ADULT ORDER      Total of 5 coronary angiograms     [facility] DRUG-ELUTING STENTS, SINGLE  2014     HYSTERECTOMY TOTAL ABD, JIMENA SALPINGO-OOPHORECTOMY, NODE DISSECTION, TUMOR DEBULKING, COMBINED N/A 4/30/2018    Procedure: COMBINED HYSTERECTOMY TOTAL ABDOMINAL, SALPINGO-OOPHORECTOMY, NODE DISSECTION, TUMOR DEBULKING;;  Surgeon: Melody Bolivar MD;  Location: [facility] OR     LAPAROSCOPIC HYSTERECTOMY TOTAL, JIMENA SALPINGO-OOPHORECTOMY, NODE DISSECTION, TUMOR STAGING, COMBINED N/A 4/30/2018    Procedure: COMBINED LAPAROSCOPIC HYSTERECTOMY TOTAL, SALPINGO-OOPHORECTOMY, NODE DISSECTION, TUMOR STAGING;  Diagnostic Laparoscopy, Laparotomy, Extensive Lysis of Adhesions, Open Bilateral Salpingo-Oophorectomy, Partial Omentectomy,  "Anesthesia Block;  Surgeon: Melody Bolivar MD;  Location: [facility] OR     SPLENECTOMY      ITP     TUBAL LIGATION      x2       Social History     Tobacco Use     Smoking status: Former Smoker     Packs/day: 0.25     Years: 42.00     Pack years: 10.50     Types: Cigarettes     Last attempt to quit: 2013     Years since quittin.1     Smokeless tobacco: Never Used   Substance Use Topics     Alcohol use: Yes     Alcohol/week: 3.6 oz     Types: 6 Cans of beer per week     Comment: A couple times a week, \"three beers max\"     Family History   Problem Relation Age of Onset     Cervical Cancer Mother         Lymph nodes were also involved, no chemotherapy needed.       Diabetes Father      Hyperlipidemia Father      Heart Disease Maternal Grandfather      Heart Disease Paternal Grandmother      Heart Disease Paternal Grandfather      Glaucoma No family hx of      Macular Degeneration No family hx of      Hypertension No family hx of      Cerebrovascular Disease No family hx of      Thyroid Disease No family hx of          Current Outpatient Medications   Medication Sig Dispense Refill     albuterol (PROAIR HFA/PROVENTIL HFA/VENTOLIN HFA) 108 (90 Base) MCG/ACT inhaler Inhale 2 puffs into the lungs every 6 hours as needed for shortness of breath / dyspnea 2 Inhaler 2     aspirin EC 81 MG tablet Take 81 mg by mouth every morning        buPROPion (WELLBUTRIN SR) 200 MG 12 hr tablet Take 1 tablet (200 mg) by mouth 2 times daily Need to see MD for further refills 60 tablet 2     diclofenac (VOLTAREN) 1 % GEL topical gel Apply  2 grams to hands four times daily using enclosed dosing card. 100 g 0     isosorbide mononitrate (IMDUR) 30 MG 24 hr tablet Take 2 tablets (60 mg) by mouth daily 180 tablet 1     levothyroxine (SYNTHROID/LEVOTHROID) 50 MCG tablet Take 1 tablet (50 mcg) by mouth daily 90 tablet 3     LORazepam (ATIVAN) 0.5 MG tablet TAKE 1/2-1 TABLET BY MOUTH EVERY 8 HOURS AS NEEDED FOR ANXIETY. DO NOT OPERATE A " VEHICLE AFTER TAKING THIS MEDICATION 1 tablet 0     metoprolol (TOPROL-XL) 50 MG 24 hr tablet Take 1 tablet (50 mg) by mouth daily 90 tablet 3     nitroglycerin (NITROSTAT) 0.4 MG sublingual tablet Place 1 tablet (0.4 mg) under the tongue every 5 minutes as needed for chest pain 25 tablet 11     pantoprazole (PROTONIX) 40 MG EC tablet Take 1 tablet (40 mg) by mouth daily 180 tablet 0     pramipexole (MIRAPEX) 0.125 MG tablet Take 1 tablet (0.125 mg) by mouth At Bedtime 90 tablet 3     rosuvastatin (CRESTOR) 20 MG tablet Take 20 mg by mouth       sertraline (ZOLOFT) 25 MG tablet Take 1 tablet (25 mg) by mouth daily Need to see MD for further refills 30 tablet 3     docusate sodium (COLACE) 100 MG tablet Take 100 mg by mouth daily (Patient not taking: Reported on 7/25/2019) 60 tablet 0     order for DME Equipment being ordered: incentive spirometry (Patient not taking: Reported on 7/25/2019) 1 Device 0     oxyCODONE-acetaminophen (PERCOCET) 5-325 MG per tablet Take 1 tablet by mouth every 6 hours as needed for pain (Patient not taking: Reported on 8/27/2019) 15 tablet 0     simethicone (MYLICON) 80 MG chewable tablet Take 1 tablet (80 mg) by mouth 4 times daily as needed for cramping (gas) (Patient not taking: Reported on 7/25/2019) 20 tablet 0     umeclidinium-vilanterol (ANORO ELLIPTA) 62.5-25 MCG/INH oral inhaler Inhale 1 puff into the lungs daily (Patient not taking: Reported on 8/27/2019) 1 Inhaler 5     Allergies   Allergen Reactions     Sulfa Drugs Rash and Hives     Plavix [Clopidogrel] Other (See Comments)     Other reaction(s): Other - Describe In Comment Field  Not effective at preventing clots  P2Y12 testing done at [facility] in 12/2014 indicates a non responder to Plavix     Sulfasalazine Rash     Recent Labs   Lab Test 07/25/19  0918 08/01/18  1103 05/05/18  0524 05/01/18  0518 04/30/18  1845  03/13/18  1446 08/29/17  1342 04/03/17  1726 04/03/17  0845  10/31/15  08/20/14   LDL 70  --   --   --   --   --  96  " --   --  85  --   --   --  113   HDL 69  --   --   --   --   --   --   --   --  84  --   --   --  66   TRIG 139  --   --   --   --   --   --   --   --  106  --   --   --  83   ALT  --   --   --   --   --   --   --  25 27  --   --  18   < >  --    CR  --   --  0.74 0.94 0.82   < >  --  0.98 1.18*  --    < > 0.87   < >  --    GFRESTIMATED  --   --  79 60* 70   < >  --  57* 46*  --    < >  --    < >  --    GFRESTBLACK  --   --  >90 73 84   < >  --  69 56*  --    < >  --    < >  --    POTASSIUM  --   --   --  3.9 3.8   < >  --  4.9 4.2  --    < > 3.8   < >  --    TSH 3.78 2.22  --   --   --   --  0.32*  --  3.37  --    < >  --    < >  --     < > = values in this interval not displayed.      BP Readings from Last 3 Encounters:   08/27/19 102/68   07/25/19 112/62   12/17/18 120/60    Wt Readings from Last 3 Encounters:   08/27/19 61.7 kg (136 lb)   07/25/19 61.7 kg (136 lb)   12/17/18 60.6 kg (133 lb 9.6 oz)                      Reviewed and updated as needed this visit by Provider  Problems         Review of Systems   ROS COMP: CONSTITUTIONAL: NEGATIVE for fever, chills, change in weight  RESP: NEGATIVE for significant cough or SOB  CV: NEGATIVE for chest pain, palpitations or peripheral edema  GI: NEGATIVE for nausea, abdominal pain, heartburn, or change in bowel habits  : none  ROS otherwise negative      Objective    /68   Pulse 80   Temp 97.6  F (36.4  C) (Oral)   Resp 16   Ht 1.518 m (4' 11.75\")   Wt 61.7 kg (136 lb)   SpO2 97%   Breastfeeding? No   BMI 26.78 kg/m    Body mass index is 26.78 kg/m .  Physical Exam   GENERAL: healthy, alert and no distress  RESP: lungs clear to auscultation - no rales, rhonchi or wheezes  CV: regular rate and rhythm, normal S1 S2, no S3 or S4, no murmur, click or rub, no peripheral edema and peripheral pulses strong  ABDOMEN: soft, nontender, no hepatosplenomegaly, no masses and bowel sounds normal  ABDOMEN: soft, nontender, without hepatosplenomegaly or masses, " bowel sounds normal and pt has a incisional hernia on the splenectomy incision  No tenderness   Bulges when asked to cough  MS: no gross musculoskeletal defects noted, no edema    Diagnostic Test Results:  Labs reviewed in Epic        Assessment & Plan     1. Incisional hernia, without obstruction or gangrene  Advised see surgery.If worse or any vomiting to follow up   - GENERAL SURG ADULT REFERRAL    2. Gastroesophageal reflux disease, esophagitis presence not specified  On PPI  If continued problems will need EGD  Follow up if not better 1 month       Return in about 2 weeks (around 9/10/2019) for Specialist appointment.    Heidy Weir MD  [facility]

## 2019-08-30 ENCOUNTER — OFFICE VISIT (OUTPATIENT)
Dept: SURGERY | Facility: CLINIC | Age: 69
End: 2019-08-30
Attending: FAMILY MEDICINE
Payer: COMMERCIAL

## 2019-08-30 VITALS
HEART RATE: 69 BPM | SYSTOLIC BLOOD PRESSURE: 130 MMHG | DIASTOLIC BLOOD PRESSURE: 73 MMHG | HEIGHT: 60 IN | WEIGHT: 136 LBS | BODY MASS INDEX: 26.7 KG/M2

## 2019-08-30 DIAGNOSIS — K43.9 VENTRAL HERNIA WITHOUT OBSTRUCTION OR GANGRENE: Primary | ICD-10-CM

## 2019-08-30 PROCEDURE — 99204 OFFICE O/P NEW MOD 45 MIN: CPT | Performed by: SURGERY

## 2019-08-30 ASSESSMENT — MIFFLIN-ST. JEOR: SCORE: 1074.64

## 2019-08-30 NOTE — PATIENT INSTRUCTIONS
Assessment: Has a hernia one medial aspect of her left upper quadrant incision and one about 2 finger above the umbilicus.  Probably 2 close together.  I did not feel an obvious one on the laeral splenectomy incision.    Patient had a lot of adhesions on her last surgery.   Plan to do will need to do a ct scan to see if other hernias.  especially the lateral splenectomy site.   If only the 3 sites then would do open.  If more than would consider laparoscopic robotic surgery.  But patient had large amount of adhesions in May that were taken down.      Risks of surgery include damage to nerves, bleeding, infection, damage to  Vessels, recurrence.  Although mesh is a better long term repair if it gets infected it must be removed.   If the patient has any bacterial infection the week before and is seen by their doctor and started on antibiotics, I can probably still do the surgery if they are vastly improved by the time of surgery, but if the infection starts closer to the surgery date it will be better to cancel and reschedule to a later date.  A cough will also be hard on the repair and uncomfortable post operative.  If the patient is a smoker I did discuss increase risk of recurrence and more pain with the cough.  If the patient is willing to quit smoking would encourage to do so and start at least a week before surgery.  However, if patient is not going to quit then must understand that his repair is more likely to fail.    Risks of surgery discussed including, but not limited to bleeding, infection, recurrence, damage to nerves and what is in the hernia sac.  Risks of anesthesia also discussed.    Discussed massaging hernia back in and using ice if becomes more painful.  If not able to reduce then go to emergency room.  Also discussed hernia belt to use until able to get in for surgery.    PLEASE follow up with ME AFTER YOUR CT SCAN.     HERNIORRHAPHY DISCHARGE INSTRUCTIONS  DR. NEEL JON    1. You may resume  your regular diet when you feel you are ready to. DO NOT drink alcoholic beverages for 24 hours or while you are taking prescription medication.    2. Limit your activities for the first 48 hours. Gradually, increase them as tolerated. You may use stairs. I encourage you to walk as tolerated. No lifting greater that 20 pounds for 3 weeks.    3. You will have some discomfort at the incision sites. This is expected. This should improve over the next 2-3 days. Ice and pain medication will help with this pain. Use prescribed pain medication as instructed.    4. Bruising and mild swelling is normal after surgery. For males it is common to have bruising going into the penis and scrotum. The area below and around the incision(s) will be hard and elevated. This is normal. I call it the healing ridge. This will resolve slowly over the next several months. If you feel the pain is increasing and cannot explain it by increasing activity please call us at [phone].    5. The dressing will often have some blood on it. You may shower 24 hours after surgery. Clean gently over incision site. If clear plastic covering or steri-strip comes off and there is still some bleeding or drainage then cover with gauze or band-aid. If no bleeding, there is no reason to cover site. The abdominal binder may be removed after 24 hours after surgery. You may continue to wear it however for comfort. I suggest  you wear an old t-shirt under the abdominal binder for a more comfortable wear.    6. Avoid Aspirin for the first 72 hours after the procedure. This medication may increase the tendency to bleed.    7. Use the following medications (in addition to your normal meds) as shown:  a. Percocet 5 mg 1-2 every 6 hours as needed for severe pain. This contains 325 mg of Tylenol (acetaminophen) per tablet.  Please do not take more than 4 grams of Tylenol (acetaminophen) per day. For example, you may take 1 Percocet and 1 Tylenol, or 2 Percocet and  no Tylenol, or 2 Tylenol and no Percocet every 6 hours.  b. Tylenol (acetaminophen) 500 mg every 6 hours as needed for mild pain. Do not take more than 1000 mg every 6 hours. (see above).  c. Motrin (ibuprofen) 200-800 mg every 6 hours as needed for mild to moderate pain. Take with food.     8. Notify Dr. Saunders's clinic at [phone] if:    Your discomfort is not relieved by your pain medication.    You have signs of infection such as temperature above 100.5 degrees orally, chills, or increasing daily discomfort.    Incision site is becoming more red and/or there is purulent drainage.    You have questions or concerns.    9. Please call [phone] to schedule a follow up appointment in about 2 weeks.    10. When taking narcotics (pain medication more than Tylenol [acetaminophen] and Motrin [ibuprofen]) it is important to keep your stools soft to avoid constipation and pain with straining. This is best done by drinking fluids (non-alcoholic and non-caffeinated) and taking a stool softener (i.e. Metamucil or milk of magnesia). You may be able to use non-narcotics for pain relief especially by the 3rd post- operative day. Tylenol (acetaminophen) 500 mg every 6 hours and/or Motrin (ibuprofen) 200-800 mg every 6 hours. Please do not take more than 4 grams of Tylenol (acetaminophen) per day. Remember your Percocet does have Tylenol (acetaminophen) already in it. Please take Motrin (ibuprofen) with food to help protect the stomach. If you have a history of stomach ulcers or stomach problems, do not take Motrin (ibuprofen).     11. Do not drive or operate heavy machinery for 24 hours after surgery or when taking narcotics. You may resume driving when feel that you can safely avoid an accident and are not taking narcotics. This is usually 5 to 7 days after surgery. You should not be alone for 24 hours after surgery.    12. Have milk of magnesia available at home so that when you take the pain medications you take  1-2 teaspoons a day,  to help reduce problems with constipation.

## 2019-08-30 NOTE — PROGRESS NOTES
"Dear Frannie Carlson  I was asked to see this patient by Frannie Allen for please see below.  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  Is bulging in the incision site.   Has had some nausea no vomitting, diahrrea, constipation   splenectomy was 1978  Has had bulging for 40 years.   Has had c sections and a midline incision for removal of large ovary and removal of appendectomy.  Had large amount of scar tissue for her ovary removal.   This was done last May.     HPI:  Patient is a 68 year old female  with complaints see above  The patient noticed the symptoms about 40 years ago.    Patient has family history of hernia problems son  nothing makes the episode better.      Review Of Systems  Respiratory: copd  Cardiovascular: has had 4 stents placed had a mild MI  Gastrointestinal: nausea  Endocrine: negative  :  negative    10 Point review of systems all others are negative.   /73   Pulse 69   Ht 1.518 m (4' 11.76\")   Wt 61.7 kg (136 lb)   BMI 26.77 kg/m      Past Medical History:   Diagnosis Date     CAD (coronary artery disease) 09/26/2014    s/p 4 MARQUES 2014     COPD (chronic obstructive pulmonary disease) (H)      Family history of sudden cardiac death (SCD)      GERD (gastroesophageal reflux disease) 2/4/2010     H/O idiopathic thrombocytopenic purpura      History of blood transfusion 2005    x 2     History of Graves' disease 1978     History of myocardial infarction 2014     History of sepsis 2015     Hyperlipidemia LDL goal <130 9/23/2009     Hypothyroidism 9/23/2009     Major depression      PONV (postoperative nausea and vomiting)        Past Surgical History:   Procedure Laterality Date     APPENDECTOMY       C/SECTION, LOW TRANSVERSE      x 4     CORONARY ANGIOGRAPHY ADULT ORDER      Total of 5 coronary angiograms     HC DRUG-ELUTING STENTS, SINGLE  2014     HYSTERECTOMY TOTAL ABD, JIMENA SALPINGO-OOPHORECTOMY, NODE " "DISSECTION, TUMOR DEBULKING, COMBINED N/A 2018    Procedure: COMBINED HYSTERECTOMY TOTAL ABDOMINAL, SALPINGO-OOPHORECTOMY, NODE DISSECTION, TUMOR DEBULKING;;  Surgeon: Melody Bolivar MD;  Location: [facility] OR     LAPAROSCOPIC HYSTERECTOMY TOTAL, JIMENA SALPINGO-OOPHORECTOMY, NODE DISSECTION, TUMOR STAGING, COMBINED N/A 2018    Procedure: COMBINED LAPAROSCOPIC HYSTERECTOMY TOTAL, SALPINGO-OOPHORECTOMY, NODE DISSECTION, TUMOR STAGING;  Diagnostic Laparoscopy, Laparotomy, Extensive Lysis of Adhesions, Open Bilateral Salpingo-Oophorectomy, Partial Omentectomy, Anesthesia Block;  Surgeon: Melody Bolivar MD;  Location: [facility] OR     SPLENECTOMY  1978    ITP     TUBAL LIGATION      x2       Social History     Socioeconomic History     Marital status:      Spouse name: Not on file     Number of children: Not on file     Years of education: Not on file     Highest education level: Not on file   Occupational History     Not on file   Social Needs     Financial resource strain: Not on file     Food insecurity:     Worry: Not on file     Inability: Not on file     Transportation needs:     Medical: Not on file     Non-medical: Not on file   Tobacco Use     Smoking status: Former Smoker     Packs/day: 0.25     Years: 42.00     Pack years: 10.50     Types: Cigarettes     Last attempt to quit: 2013     Years since quittin.1     Smokeless tobacco: Never Used   Substance and Sexual Activity     Alcohol use: Yes     Alcohol/week: 3.6 oz     Types: 6 Cans of beer per week     Comment: A couple times a week, \"three beers max\"     Drug use: No     Sexual activity: Yes     Partners: Male     Birth control/protection: Post-menopausal, Female Surgical   Lifestyle     Physical activity:     Days per week: Not on file     Minutes per session: Not on file     Stress: Not on file   Relationships     Social connections:     Talks on phone: Not on file     Gets together: Not on file     Attends Anabaptist service: Not on file     " Active member of club or organization: Not on file     Attends meetings of clubs or organizations: Not on file     Relationship status: Not on file     Intimate partner violence:     Fear of current or ex partner: Not on file     Emotionally abused: Not on file     Physically abused: Not on file     Forced sexual activity: Not on file   Other Topics Concern     Parent/sibling w/ CABG, MI or angioplasty before 65F 55M? Not Asked   Social History Narrative     Not on file       Current Outpatient Medications   Medication Sig Dispense Refill     albuterol (PROAIR HFA/PROVENTIL HFA/VENTOLIN HFA) 108 (90 Base) MCG/ACT inhaler Inhale 2 puffs into the lungs every 6 hours as needed for shortness of breath / dyspnea 2 Inhaler 2     aspirin EC 81 MG tablet Take 81 mg by mouth every morning        buPROPion (WELLBUTRIN SR) 200 MG 12 hr tablet Take 1 tablet (200 mg) by mouth 2 times daily Need to see MD for further refills 60 tablet 2     diclofenac (VOLTAREN) 1 % GEL topical gel Apply  2 grams to hands four times daily using enclosed dosing card. 100 g 0     docusate sodium (COLACE) 100 MG tablet Take 100 mg by mouth daily (Patient not taking: Reported on 7/25/2019) 60 tablet 0     isosorbide mononitrate (IMDUR) 30 MG 24 hr tablet Take 2 tablets (60 mg) by mouth daily 180 tablet 1     levothyroxine (SYNTHROID/LEVOTHROID) 50 MCG tablet Take 1 tablet (50 mcg) by mouth daily 90 tablet 3     LORazepam (ATIVAN) 0.5 MG tablet TAKE 1/2-1 TABLET BY MOUTH EVERY 8 HOURS AS NEEDED FOR ANXIETY. DO NOT OPERATE A VEHICLE AFTER TAKING THIS MEDICATION 1 tablet 0     metoprolol (TOPROL-XL) 50 MG 24 hr tablet Take 1 tablet (50 mg) by mouth daily 90 tablet 3     nitroglycerin (NITROSTAT) 0.4 MG sublingual tablet Place 1 tablet (0.4 mg) under the tongue every 5 minutes as needed for chest pain 25 tablet 11     order for DME Equipment being ordered: incentive spirometry (Patient not taking: Reported on 7/25/2019) 1 Device 0      "oxyCODONE-acetaminophen (PERCOCET) 5-325 MG per tablet Take 1 tablet by mouth every 6 hours as needed for pain (Patient not taking: Reported on 8/27/2019) 15 tablet 0     pantoprazole (PROTONIX) 40 MG EC tablet Take 1 tablet (40 mg) by mouth daily 180 tablet 0     pramipexole (MIRAPEX) 0.125 MG tablet Take 1 tablet (0.125 mg) by mouth At Bedtime 90 tablet 3     rosuvastatin (CRESTOR) 20 MG tablet Take 20 mg by mouth       sertraline (ZOLOFT) 25 MG tablet Take 1 tablet (25 mg) by mouth daily Need to see MD for further refills 30 tablet 3     simethicone (MYLICON) 80 MG chewable tablet Take 1 tablet (80 mg) by mouth 4 times daily as needed for cramping (gas) (Patient not taking: Reported on 7/25/2019) 20 tablet 0     umeclidinium-vilanterol (ANORO ELLIPTA) 62.5-25 MCG/INH oral inhaler Inhale 1 puff into the lungs daily (Patient not taking: Reported on 8/27/2019) 1 Inhaler 5       Above was reviewed  Physical exam: /73   Pulse 69   Ht 1.518 m (4' 11.76\")   Wt 61.7 kg (136 lb)   BMI 26.77 kg/m     Patient able to get up on table without difficulty.   Patient is alert and orientated.   Head eyes, nose and mouth within normal limits.  No supraclavicular or cervical adenopathy palpated.  Thyroid within normal limits.  No carotid bruits auscultated.  Lungs are clear to auscultation  Heart is regular rate and rhythm with no murmur or thrills noted.  No costal vertebral angle tenderness noted.  Abdomen is abdomen is soft without significant tenderness, masses, organomegaly or guarding  bowel sounds are positive and no caput medusa noted.  Has a hernia one medial aspect of her left upper quadrant incision and one about 2 finger above the umbilicus. I did not feel an obvious one on the laeral splenectomy incision.        Skin was warm and pink  Normal Affect    Lower extremity edema is not present.      Assessment: Has a hernia one medial aspect of her left upper quadrant incision and one about 2 finger above the " umbilicus.  Probably 2 close together.  I did not feel an obvious one on the laeral splenectomy incision.    Patient had a lot of adhesions on her last surgery.   Plan to do will need to do a ct scan to see if other hernias.  especially the lateral splenectomy site.   If only the 3 sites then would do open.  If more than would consider laparoscopic robotic surgery.  But patient had large amount of adhesions in May that were taken down.      Risks of surgery include damage to nerves, bleeding, infection, damage to  Vessels, recurrence.  Although mesh is a better long term repair if it gets infected it must be removed.   If the patient has any bacterial infection the week before and is seen by their doctor and started on antibiotics, I can probably still do the surgery if they are vastly improved by the time of surgery, but if the infection starts closer to the surgery date it will be better to cancel and reschedule to a later date.  A cough will also be hard on the repair and uncomfortable post operative.  If the patient is a smoker I did discuss increase risk of recurrence and more pain with the cough.  If the patient is willing to quit smoking would encourage to do so and start at least a week before surgery.  However, if patient is not going to quit then must understand that his repair is more likely to fail.    Risks of surgery discussed including, but not limited to bleeding, infection, recurrence, damage to nerves and what is in the hernia sac.  Risks of anesthesia also discussed.    Discussed massaging hernia back in and using ice if becomes more painful.  If not able to reduce then go to emergency room.  Also discussed hernia belt to use until able to get in for surgery.    PLEASE follow up with ME AFTER YOUR CGT SCAN.   Time spent with the patient with greater that 50% of the time in discussion was 30 minutes.  In discussing the options .      Da Saunders MD

## 2019-08-30 NOTE — LETTER
"    8/30/2019         RE: Michela Cole  [address] [facility] [place] [place] [address]        Dear Colleague,    Thank you for referring your patient, Michela Cole, to the [facility]. Please see a copy of my visit note below.    Dear Frannie Carlson  I was asked to see this patient by Frannie Allen for please see below.  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  Is bulging in the incision site.   Has had some nausea no vomitting, diahrrea, constipation   splenectomy was 1978  Has had bulging for 40 years.   Has had c sections and a midline incision for removal of large ovary and removal of appendectomy.  Had large amount of scar tissue for her ovary removal.   This was done last May.     HPI:  Patient is a 68 year old female  with complaints see above  The patient noticed the symptoms about 40 years ago.    Patient has family history of hernia problems son  nothing makes the episode better.      Review Of Systems  Respiratory: copd  Cardiovascular: has had 4 stents placed had a mild MI  Gastrointestinal: nausea  Endocrine: negative  :  negative    10 Point review of systems all others are negative.   /73   Pulse 69   Ht 1.518 m (4' 11.76\")   Wt 61.7 kg (136 lb)   BMI 26.77 kg/m       Past Medical History:   Diagnosis Date     CAD (coronary artery disease) 09/26/2014    s/p 4 MARQUES 2014     COPD (chronic obstructive pulmonary disease) (H)      Family history of sudden cardiac death (SCD)      GERD (gastroesophageal reflux disease) 2/4/2010     H/O idiopathic thrombocytopenic purpura      History of blood transfusion 2005    x 2     History of Graves' disease 1978     History of myocardial infarction 2014     History of sepsis 2015     Hyperlipidemia LDL goal <130 9/23/2009     Hypothyroidism 9/23/2009     Major depression      PONV (postoperative nausea and vomiting)        Past Surgical History: " "  Procedure Laterality Date     APPENDECTOMY       C/SECTION, LOW TRANSVERSE      x 4     CORONARY ANGIOGRAPHY ADULT ORDER      Total of 5 coronary angiograms     HC DRUG-ELUTING STENTS, SINGLE       HYSTERECTOMY TOTAL ABD, JIMENA SALPINGO-OOPHORECTOMY, NODE DISSECTION, TUMOR DEBULKING, COMBINED N/A 2018    Procedure: COMBINED HYSTERECTOMY TOTAL ABDOMINAL, SALPINGO-OOPHORECTOMY, NODE DISSECTION, TUMOR DEBULKING;;  Surgeon: Meloyd Bolivar MD;  Location: [facility] OR     LAPAROSCOPIC HYSTERECTOMY TOTAL, JIMENA SALPINGO-OOPHORECTOMY, NODE DISSECTION, TUMOR STAGING, COMBINED N/A 2018    Procedure: COMBINED LAPAROSCOPIC HYSTERECTOMY TOTAL, SALPINGO-OOPHORECTOMY, NODE DISSECTION, TUMOR STAGING;  Diagnostic Laparoscopy, Laparotomy, Extensive Lysis of Adhesions, Open Bilateral Salpingo-Oophorectomy, Partial Omentectomy, Anesthesia Block;  Surgeon: Melody Bolivar MD;  Location: [facility] OR     SPLENECTOMY  1978    ITP     TUBAL LIGATION      x2       Social History     Socioeconomic History     Marital status:      Spouse name: Not on file     Number of children: Not on file     Years of education: Not on file     Highest education level: Not on file   Occupational History     Not on file   Social Needs     Financial resource strain: Not on file     Food insecurity:     Worry: Not on file     Inability: Not on file     Transportation needs:     Medical: Not on file     Non-medical: Not on file   Tobacco Use     Smoking status: Former Smoker     Packs/day: 0.25     Years: 42.00     Pack years: 10.50     Types: Cigarettes     Last attempt to quit: 2013     Years since quittin.1     Smokeless tobacco: Never Used   Substance and Sexual Activity     Alcohol use: Yes     Alcohol/week: 3.6 oz     Types: 6 Cans of beer per week     Comment: A couple times a week, \"three beers max\"     Drug use: No     Sexual activity: Yes     Partners: Male     Birth control/protection: Post-menopausal, Female Surgical   Lifestyle     " Physical activity:     Days per week: Not on file     Minutes per session: Not on file     Stress: Not on file   Relationships     Social connections:     Talks on phone: Not on file     Gets together: Not on file     Attends Scientology service: Not on file     Active member of club or organization: Not on file     Attends meetings of clubs or organizations: Not on file     Relationship status: Not on file     Intimate partner violence:     Fear of current or ex partner: Not on file     Emotionally abused: Not on file     Physically abused: Not on file     Forced sexual activity: Not on file   Other Topics Concern     Parent/sibling w/ CABG, MI or angioplasty before 65F 55M? Not Asked   Social History Narrative     Not on file       Current Outpatient Medications   Medication Sig Dispense Refill     albuterol (PROAIR HFA/PROVENTIL HFA/VENTOLIN HFA) 108 (90 Base) MCG/ACT inhaler Inhale 2 puffs into the lungs every 6 hours as needed for shortness of breath / dyspnea 2 Inhaler 2     aspirin EC 81 MG tablet Take 81 mg by mouth every morning        buPROPion (WELLBUTRIN SR) 200 MG 12 hr tablet Take 1 tablet (200 mg) by mouth 2 times daily Need to see MD for further refills 60 tablet 2     diclofenac (VOLTAREN) 1 % GEL topical gel Apply  2 grams to hands four times daily using enclosed dosing card. 100 g 0     docusate sodium (COLACE) 100 MG tablet Take 100 mg by mouth daily (Patient not taking: Reported on 7/25/2019) 60 tablet 0     isosorbide mononitrate (IMDUR) 30 MG 24 hr tablet Take 2 tablets (60 mg) by mouth daily 180 tablet 1     levothyroxine (SYNTHROID/LEVOTHROID) 50 MCG tablet Take 1 tablet (50 mcg) by mouth daily 90 tablet 3     LORazepam (ATIVAN) 0.5 MG tablet TAKE 1/2-1 TABLET BY MOUTH EVERY 8 HOURS AS NEEDED FOR ANXIETY. DO NOT OPERATE A VEHICLE AFTER TAKING THIS MEDICATION 1 tablet 0     metoprolol (TOPROL-XL) 50 MG 24 hr tablet Take 1 tablet (50 mg) by mouth daily 90 tablet 3     nitroglycerin (NITROSTAT)  "0.4 MG sublingual tablet Place 1 tablet (0.4 mg) under the tongue every 5 minutes as needed for chest pain 25 tablet 11     order for DME Equipment being ordered: incentive spirometry (Patient not taking: Reported on 7/25/2019) 1 Device 0     oxyCODONE-acetaminophen (PERCOCET) 5-325 MG per tablet Take 1 tablet by mouth every 6 hours as needed for pain (Patient not taking: Reported on 8/27/2019) 15 tablet 0     pantoprazole (PROTONIX) 40 MG EC tablet Take 1 tablet (40 mg) by mouth daily 180 tablet 0     pramipexole (MIRAPEX) 0.125 MG tablet Take 1 tablet (0.125 mg) by mouth At Bedtime 90 tablet 3     rosuvastatin (CRESTOR) 20 MG tablet Take 20 mg by mouth       sertraline (ZOLOFT) 25 MG tablet Take 1 tablet (25 mg) by mouth daily Need to see MD for further refills 30 tablet 3     simethicone (MYLICON) 80 MG chewable tablet Take 1 tablet (80 mg) by mouth 4 times daily as needed for cramping (gas) (Patient not taking: Reported on 7/25/2019) 20 tablet 0     umeclidinium-vilanterol (ANORO ELLIPTA) 62.5-25 MCG/INH oral inhaler Inhale 1 puff into the lungs daily (Patient not taking: Reported on 8/27/2019) 1 Inhaler 5       Above was reviewed  Physical exam: /73   Pulse 69   Ht 1.518 m (4' 11.76\")   Wt 61.7 kg (136 lb)   BMI 26.77 kg/m      Patient able to get up on table without difficulty.   Patient is alert and orientated.   Head eyes, nose and mouth within normal limits.  No supraclavicular or cervical adenopathy palpated.  Thyroid within normal limits.  No carotid bruits auscultated.  Lungs are clear to auscultation  Heart is regular rate and rhythm with no murmur or thrills noted.  No costal vertebral angle tenderness noted.  Abdomen is abdomen is soft without significant tenderness, masses, organomegaly or guarding  bowel sounds are positive and no caput medusa noted.  Has a hernia one medial aspect of her left upper quadrant incision and one about 2 finger above the umbilicus. I did not feel an obvious one " on the laeral splenectomy incision.        Skin was warm and pink  Normal Affect    Lower extremity edema is not present.      Assessment: Has a hernia one medial aspect of her left upper quadrant incision and one about 2 finger above the umbilicus.  Probably 2 close together.  I did not feel an obvious one on the laeral splenectomy incision.    Patient had a lot of adhesions on her last surgery.   Plan to do will need to do a ct scan to see if other hernias.  especially the lateral splenectomy site.   If only the 3 sites then would do open.  If more than would consider laparoscopic robotic surgery.  But patient had large amount of adhesions in May that were taken down.      Risks of surgery include damage to nerves, bleeding, infection, damage to  Vessels, recurrence.  Although mesh is a better long term repair if it gets infected it must be removed.   If the patient has any bacterial infection the week before and is seen by their doctor and started on antibiotics, I can probably still do the surgery if they are vastly improved by the time of surgery, but if the infection starts closer to the surgery date it will be better to cancel and reschedule to a later date.  A cough will also be hard on the repair and uncomfortable post operative.  If the patient is a smoker I did discuss increase risk of recurrence and more pain with the cough.  If the patient is willing to quit smoking would encourage to do so and start at least a week before surgery.  However, if patient is not going to quit then must understand that his repair is more likely to fail.    Risks of surgery discussed including, but not limited to bleeding, infection, recurrence, damage to nerves and what is in the hernia sac.  Risks of anesthesia also discussed.    Discussed massaging hernia back in and using ice if becomes more painful.  If not able to reduce then go to emergency room.  Also discussed hernia belt to use until able to get in for  surgery.    PLEASE follow up with ME AFTER YOUR CGT SCAN.   Time spent with the patient with greater that 50% of the time in discussion was 30 minutes.  In discussing the options .      Da Saunders MD      Again, thank you for allowing me to participate in the care of your patient.        Sincerely,        Da Saunders MD

## 2019-09-03 ENCOUNTER — ANCILLARY PROCEDURE (OUTPATIENT)
Dept: CT IMAGING | Facility: CLINIC | Age: 69
End: 2019-09-03
Attending: SURGERY
Payer: COMMERCIAL

## 2019-09-03 DIAGNOSIS — K43.9 VENTRAL HERNIA WITHOUT OBSTRUCTION OR GANGRENE: ICD-10-CM

## 2019-09-03 PROCEDURE — 74177 CT ABD & PELVIS W/CONTRAST: CPT | Mod: TC

## 2019-09-03 RX ORDER — IOPAMIDOL 755 MG/ML
67 INJECTION, SOLUTION INTRAVASCULAR ONCE
Status: COMPLETED | OUTPATIENT
Start: 2019-09-03 | End: 2019-09-03

## 2019-09-03 RX ADMIN — IOPAMIDOL 67 ML: 755 INJECTION, SOLUTION INTRAVASCULAR at 10:04

## 2019-09-10 ENCOUNTER — OFFICE VISIT (OUTPATIENT)
Dept: SURGERY | Facility: CLINIC | Age: 69
End: 2019-09-10
Payer: COMMERCIAL

## 2019-09-10 VITALS
HEIGHT: 59 IN | SYSTOLIC BLOOD PRESSURE: 102 MMHG | HEART RATE: 76 BPM | DIASTOLIC BLOOD PRESSURE: 63 MMHG | WEIGHT: 135 LBS | BODY MASS INDEX: 27.21 KG/M2

## 2019-09-10 DIAGNOSIS — K40.90 RIGHT INGUINAL HERNIA: ICD-10-CM

## 2019-09-10 DIAGNOSIS — K43.9 VENTRAL HERNIA WITHOUT OBSTRUCTION OR GANGRENE: Primary | ICD-10-CM

## 2019-09-10 PROCEDURE — 99214 OFFICE O/P EST MOD 30 MIN: CPT | Performed by: SURGERY

## 2019-09-10 ASSESSMENT — MIFFLIN-ST. JEOR: SCORE: 1057.99

## 2019-09-10 NOTE — PROGRESS NOTES
Patient is here to discuss her ct scan and the 4 hernias seen on ct scan.     There are two, [id number]-kvhr fat-containing ventral hernias above the  umbilicus (series 2, images 13-15). There is another smaller ventral  hernia above the umbilicus (series 2, image 35) that contains fat. A  fourth fat-containing ventral hernia is present to the right of  midline at the level of the umbilicus (series 2, image 40). There is a  small fat-containing right inguinal hernia.    Patient has burning in the upper abdomen and nausea after bending over and cleaning .  Has fairly discomfort in the upper abdomen.     Assessment: Has a hernia one medial aspect of her left upper quadrant incision and one about 2 finger above the umbilicus.  Probably 2 close together.  I did not feel an obvious one on the laeral splenectomy incision.    Patient had a lot of adhesions on her last surgery.     I think that doing this open thru 1 to 2 incisions at the medial splenectomy site and then same with small incision at the area just above the umbilicus and then would do the right inguinal hernia repair with mesh open.  She is definitely tender with her right inguinal hernia on palpation.  Did not look first time due to her with no complains of  Of pain in this area, but ct scan did show hernia there.     Risks of surgery include damage to nerves, bleeding, infection, damage to  Vessels, recurrence.  Although mesh is a better long term repair if it gets infected it must be removed.   If the patient has any bacterial infection the week before and is seen by their doctor and started on antibiotics, I can probably still do the surgery if they are vastly improved by the time of surgery, but if the infection starts closer to the surgery date it will be better to cancel and reschedule to a later date.  A cough will also be hard on the repair and uncomfortable post operative.  If the patient is a smoker I did discuss increase risk of recurrence and more  pain with the cough.  If the patient is willing to quit smoking would encourage to do so and start at least a week before surgery.  However, if patient is not going to quit then must understand that his repair is more likely to fail.     Risks of surgery discussed including, but not limited to bleeding, infection, recurrence, damage to nerves and what is in the hernia sac.  Risks of anesthesia also discussed.     Discussed massaging hernia back in and using ice if becomes more painful.  If not able to reduce then go to emergency room.  Also discussed hernia belt to use until able to get in for surgery.   would do at the hospital in case need to open more.     Time spent with the patient with greater that 50% of the time in discussion was 30 minutes.  In discussing the hernia options and would do at the hospital in case need to open more. .      Da Saunders MD                                                                         [facility]                                                                                                                                         [address] [place] [address]                                                                                                                                         Tel [phone]  Michela MERCADO Paciorek  [address] [facility] [id number]        September 4, 2019     Dear Michela,  This letter is to inform you of the results of your ct scan  If you have questions please feel free to call my assistant  At [phone] .     Your report was:           Narrative       CT ABDOMEN/PELVIS WITH CONTRAST September 3, 2019 10:04 AM     HISTORY: Looking for other hernias besides the supraumbilical and the  epigastric hernias that I feel. Ventral hernia without obstruction  or  gangrene.    TECHNIQUE: 67 mL [id number]. CT images of the abdomen and pelvis  following nonionic intravenous contrast. Radiation dose for this scan  was reduced using automated exposure control, adjustment of the mA  and/or kV according to patient size, or iterative reconstruction  technique.    COMPARISON: None.    FINDINGS: Dependent high density in the gallbladder likely small  stones. The liver, pancreas, and adrenal glands are unremarkable.  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 Right kidney appears normal. There is a simple  appearing cyst and an otherwise unremarkable left kidney. There is  mild nonaneurysmal aortic atherosclerosis. No abdominal or  retroperitoneal lymphadenopathy. No abnormal bowel distention, free  air, or ascites. There is sigmoid diverticulosis without  diverticulitis. No pelvic adenopathy or free fluid. No lytic or  blastic bone lesions.    There are two, yawy-gj-[id number] fat-containing ventral hernias above the  umbilicus (series 2, images 13-15). There is another smaller ventral  hernia above the umbilicus (series 2, image 35) that contains fat. A  fourth fat-containing ventral hernia is present to the right of  midline at the level of the umbilicus (series 2, image 40). There is a  small fat-containing right inguinal hernia.       Impression       IMPRESSION:  1. Multiple abdominal wall hernias described above.  2. Cholelithiasis and diverticulosis.    LUZ CARTAGENA MD         So there is more hernias than we could feel.   Please follow up with me in the office to discuss the options again and decide which way we should proceed.      If you do have further questions please don t hesitate to call my assistant at  .  We do not have someone answering the phone all the time at my assistants number so if leave a message may take a day or so to get back to you.  So if more urgent then call the below number.    To make an appointment call [phone]: .   Sincerely,      Da Saunders,  "M.D.      Office Visit     8/30/2019  [facility] Da Webb MD   Surgery   Ventral hernia without obstruction or gangrene   Dx   Hernia ; Referred by Heidy Weir MD   Reason for Visit    Progress Notes     Expand All Collapse All    Dear Frannie Carlson  I was asked to see this patient by Frannie Allen for please see below.  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  Is bulging in the incision site.   Has had some nausea no vomitting, diahrrea, constipation   splenectomy was 1978  Has had bulging for 40 years.   Has had c sections and a midline incision for removal of large ovary and removal of appendectomy.  Had large amount of scar tissue for her ovary removal.   This was done last May.      HPI:  Patient is a 68 year old female  with complaints see above  The patient noticed the symptoms about 40 years ago.    Patient has family history of hernia problems son  nothing makes the episode better.        Review Of Systems  Respiratory: copd  Cardiovascular: has had 4 stents placed had a mild MI  Gastrointestinal: nausea  Endocrine: negative  :  negative     10 Point review of systems all others are negative.   /73   Pulse 69   Ht 1.518 m (4' 11.76\")   Wt 61.7 kg (136 lb)   BMI 26.77 kg/m       Past Medical History        Past Medical History:   Diagnosis Date     CAD (coronary artery disease) 09/26/2014     s/p 4 MARQUES 2014     COPD (chronic obstructive pulmonary disease) (H)       Family history of sudden cardiac death (SCD)       GERD (gastroesophageal reflux disease) 2/4/2010     H/O idiopathic thrombocytopenic purpura       History of blood transfusion 2005     x 2     History of Graves' disease 1978     History of myocardial infarction 2014     History of sepsis 2015     Hyperlipidemia LDL goal <130 9/23/2009     Hypothyroidism 9/23/2009     Major depression       PONV (postoperative nausea " and vomiting)              Past Surgical History         Past Surgical History:   Procedure Laterality Date     APPENDECTOMY         C/SECTION, LOW TRANSVERSE         x 4     CORONARY ANGIOGRAPHY ADULT ORDER         Total of 5 coronary angiograms     HC DRUG-ELUTING STENTS, SINGLE   2014     HYSTERECTOMY TOTAL ABD, JIMENA SALPINGO-OOPHORECTOMY, NODE DISSECTION, TUMOR DEBULKING, COMBINED N/A 2018     Procedure: COMBINED HYSTERECTOMY TOTAL ABDOMINAL, SALPINGO-OOPHORECTOMY, NODE DISSECTION, TUMOR DEBULKING;;  Surgeon: Melody Bolivar MD;  Location: [facility] OR     LAPAROSCOPIC HYSTERECTOMY TOTAL, JIMENA SALPINGO-OOPHORECTOMY, NODE DISSECTION, TUMOR STAGING, COMBINED N/A 2018     Procedure: COMBINED LAPAROSCOPIC HYSTERECTOMY TOTAL, SALPINGO-OOPHORECTOMY, NODE DISSECTION, TUMOR STAGING;  Diagnostic Laparoscopy, Laparotomy, Extensive Lysis of Adhesions, Open Bilateral Salpingo-Oophorectomy, Partial Omentectomy, Anesthesia Block;  Surgeon: Melody Bolivar MD;  Location: [facility] OR     SPLENECTOMY   1978     ITP     TUBAL LIGATION         x2            Social History   Social History            Socioeconomic History     Marital status:        Spouse name: Not on file     Number of children: Not on file     Years of education: Not on file     Highest education level: Not on file   Occupational History     Not on file   Social Needs     Financial resource strain: Not on file     Food insecurity:       Worry: Not on file       Inability: Not on file     Transportation needs:       Medical: Not on file       Non-medical: Not on file   Tobacco Use     Smoking status: Former Smoker       Packs/day: 0.25       Years: 42.00       Pack years: 10.50       Types: Cigarettes       Last attempt to quit: 2013       Years since quittin.1     Smokeless tobacco: Never Used   Substance and Sexual Activity     Alcohol use: Yes       Alcohol/week: 3.6 oz       Types: 6 Cans of beer per week       Comment: A couple times a week,  "\"three beers max\"     Drug use: No     Sexual activity: Yes       Partners: Male       Birth control/protection: Post-menopausal, Female Surgical   Lifestyle     Physical activity:       Days per week: Not on file       Minutes per session: Not on file     Stress: Not on file   Relationships     Social connections:       Talks on phone: Not on file       Gets together: Not on file       Attends Jain service: Not on file       Active member of club or organization: Not on file       Attends meetings of clubs or organizations: Not on file       Relationship status: Not on file     Intimate partner violence:       Fear of current or ex partner: Not on file       Emotionally abused: Not on file       Physically abused: Not on file       Forced sexual activity: Not on file   Other Topics Concern     Parent/sibling w/ CABG, MI or angioplasty before 65F 55M? Not Asked   Social History Narrative     Not on file            Current Outpatient Prescriptions   Current Outpatient Medications   Medication Sig Dispense Refill     albuterol (PROAIR HFA/PROVENTIL HFA/VENTOLIN HFA) 108 (90 Base) MCG/ACT inhaler Inhale 2 puffs into the lungs every 6 hours as needed for shortness of breath / dyspnea 2 Inhaler 2     aspirin EC 81 MG tablet Take 81 mg by mouth every morning          buPROPion (WELLBUTRIN SR) 200 MG 12 hr tablet Take 1 tablet (200 mg) by mouth 2 times daily Need to see MD for further refills 60 tablet 2     diclofenac (VOLTAREN) 1 % GEL topical gel Apply  2 grams to hands four times daily using enclosed dosing card. 100 g 0     docusate sodium (COLACE) 100 MG tablet Take 100 mg by mouth daily (Patient not taking: Reported on 7/25/2019) 60 tablet 0     isosorbide mononitrate (IMDUR) 30 MG 24 hr tablet Take 2 tablets (60 mg) by mouth daily 180 tablet 1     levothyroxine (SYNTHROID/LEVOTHROID) 50 MCG tablet Take 1 tablet (50 mcg) by mouth daily 90 tablet 3     LORazepam (ATIVAN) 0.5 MG tablet TAKE 1/2-1 TABLET BY MOUTH " "EVERY 8 HOURS AS NEEDED FOR ANXIETY. DO NOT OPERATE A VEHICLE AFTER TAKING THIS MEDICATION 1 tablet 0     metoprolol (TOPROL-XL) 50 MG 24 hr tablet Take 1 tablet (50 mg) by mouth daily 90 tablet 3     nitroglycerin (NITROSTAT) 0.4 MG sublingual tablet Place 1 tablet (0.4 mg) under the tongue every 5 minutes as needed for chest pain 25 tablet 11     order for DME Equipment being ordered: incentive spirometry (Patient not taking: Reported on 7/25/2019) 1 Device 0     oxyCODONE-acetaminophen (PERCOCET) 5-325 MG per tablet Take 1 tablet by mouth every 6 hours as needed for pain (Patient not taking: Reported on 8/27/2019) 15 tablet 0     pantoprazole (PROTONIX) 40 MG EC tablet Take 1 tablet (40 mg) by mouth daily 180 tablet 0     pramipexole (MIRAPEX) 0.125 MG tablet Take 1 tablet (0.125 mg) by mouth At Bedtime 90 tablet 3     rosuvastatin (CRESTOR) 20 MG tablet Take 20 mg by mouth         sertraline (ZOLOFT) 25 MG tablet Take 1 tablet (25 mg) by mouth daily Need to see MD for further refills 30 tablet 3     simethicone (MYLICON) 80 MG chewable tablet Take 1 tablet (80 mg) by mouth 4 times daily as needed for cramping (gas) (Patient not taking: Reported on 7/25/2019) 20 tablet 0     umeclidinium-vilanterol (ANORO ELLIPTA) 62.5-25 MCG/INH oral inhaler Inhale 1 puff into the lungs daily (Patient not taking: Reported on 8/27/2019) 1 Inhaler 5            Above was reviewed  Physical exam: /73   Pulse 69   Ht 1.518 m (4' 11.76\")   Wt 61.7 kg (136 lb)   BMI 26.77 kg/m     Patient able to get up on table without difficulty.   Patient is alert and orientated.   Head eyes, nose and mouth within normal limits.  No supraclavicular or cervical adenopathy palpated.  Thyroid within normal limits.  No carotid bruits auscultated.  Lungs are clear to auscultation  Heart is regular rate and rhythm with no murmur or thrills noted.  No costal vertebral angle tenderness noted.  Abdomen is abdomen is soft without significant " tenderness, masses, organomegaly or guarding  bowel sounds are positive and no caput medusa noted.  Has a hernia one medial aspect of her left upper quadrant incision and one about 2 finger above the umbilicus. I did not feel an obvious one on the laeral splenectomy incision.        Skin was warm and pink  Normal Affect     Lower extremity edema is not present.        Assessment: Has a hernia one medial aspect of her left upper quadrant incision and one about 2 finger above the umbilicus.  Probably 2 close together.  I did not feel an obvious one on the laeral splenectomy incision.    Patient had a lot of adhesions on her last surgery.   Plan to do will need to do a ct scan to see if other hernias.  especially the lateral splenectomy site.   If only the 3 sites then would do open.  If more than would consider laparoscopic robotic surgery.  But patient had large amount of adhesions in May that were taken down.       Risks of surgery include damage to nerves, bleeding, infection, damage to  Vessels, recurrence.  Although mesh is a better long term repair if it gets infected it must be removed.   If the patient has any bacterial infection the week before and is seen by their doctor and started on antibiotics, I can probably still do the surgery if they are vastly improved by the time of surgery, but if the infection starts closer to the surgery date it will be better to cancel and reschedule to a later date.  A cough will also be hard on the repair and uncomfortable post operative.  If the patient is a smoker I did discuss increase risk of recurrence and more pain with the cough.  If the patient is willing to quit smoking would encourage to do so and start at least a week before surgery.  However, if patient is not going to quit then must understand that his repair is more likely to fail.     Risks of surgery discussed including, but not limited to bleeding, infection, recurrence, damage to nerves and what is in the  hernia sac.  Risks of anesthesia also discussed.     Discussed massaging hernia back in and using ice if becomes more painful.  If not able to reduce then go to emergency room.  Also discussed hernia belt to use until able to get in for surgery.     PLEASE follow up with ME AFTER YOUR CGT SCAN.   Time spent with the patient with greater that 50% of the time in discussion was 30 minutes.  In discussing the options .        Da Saunders MD

## 2019-09-10 NOTE — LETTER
9/10/2019         RE: Michela Cole  [address] [facility]  [place] [address]        Dear Colleague,    Thank you for referring your patient, Michela Cole, to the [facility]. Please see a copy of my visit note below.    Patient is here to discuss her ct scan and the 4 hernias seen on ct scan.     There are two, doco-db-[id number] fat-containing ventral hernias above the  umbilicus (series 2, images 13-15). There is another smaller ventral  hernia above the umbilicus (series 2, image 35) that contains fat. A  fourth fat-containing ventral hernia is present to the right of  midline at the level of the umbilicus (series 2, image 40). There is a  small fat-containing right inguinal hernia.    Patient has burning in the upper abdomen and nausea after bending over and cleaning .  Has fairly discomfort in the upper abdomen.     Assessment: Has a hernia one medial aspect of her left upper quadrant incision and one about 2 finger above the umbilicus.  Probably 2 close together.  I did not feel an obvious one on the laeral splenectomy incision.    Patient had a lot of adhesions on her last surgery.     I think that doing this open thru 1 to 2 incisions at the medial splenectomy site and then same with small incision at the area just above the umbilicus and then would do the right inguinal hernia repair with mesh open.  She is definitely tender with her right inguinal hernia on palpation.  Did not look first time due to her with no complains of  Of pain in this area, but ct scan did show hernia there.     Risks of surgery include damage to nerves, bleeding, infection, damage to  Vessels, recurrence.  Although mesh is a better long term repair if it gets infected it must be removed.   If the patient has any bacterial infection the week before and is seen by their doctor and started on antibiotics, I can probably still do the surgery if they are vastly improved by the time of surgery, but if the  infection starts closer to the surgery date it will be better to cancel and reschedule to a later date.  A cough will also be hard on the repair and uncomfortable post operative.  If the patient is a smoker I did discuss increase risk of recurrence and more pain with the cough.  If the patient is willing to quit smoking would encourage to do so and start at least a week before surgery.  However, if patient is not going to quit then must understand that his repair is more likely to fail.     Risks of surgery discussed including, but not limited to bleeding, infection, recurrence, damage to nerves and what is in the hernia sac.  Risks of anesthesia also discussed.     Discussed massaging hernia back in and using ice if becomes more painful.  If not able to reduce then go to emergency room.  Also discussed hernia belt to use until able to get in for surgery.   would do at the hospital in case need to open more.     Time spent with the patient with greater that 50% of the time in discussion was 30 minutes.  In discussing the hernia options and would do at the hospital in case need to open more. .      Da Saunders MD                                                                         [place] [id number] [facility] [address]                                                                                                                                         Tel [phone]  Michela MERCADO Paciorek  [address] [facility] [facility] [address]        September 4, 2019     Dear Michela,  This letter is to inform you of the results of your ct scan  If you have questions please feel free to call my assistant  At [phone]  .     Your report was:           Narrative       CT ABDOMEN/PELVIS WITH CONTRAST September 3, 2019 10:04 AM     HISTORY: Looking for other hernias besides the supraumbilical and the  epigastric hernias that I feel. Ventral hernia without obstruction or  gangrene.    TECHNIQUE: 67 mL Isovue-370. CT images of the abdomen and pelvis  following nonionic intravenous contrast. Radiation dose for this scan  was reduced using automated exposure control, adjustment of the mA  and/or kV according to patient size, or iterative reconstruction  technique.    COMPARISON: None.    FINDINGS: Dependent high density in the gallbladder likely small  stones. The liver, pancreas, and adrenal glands are unremarkable.  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 Right kidney appears normal. There is a simple  appearing cyst and an otherwise unremarkable left kidney. There is  mild nonaneurysmal aortic atherosclerosis. No abdominal or  retroperitoneal lymphadenopathy. No abnormal bowel distention, free  air, or ascites. There is sigmoid diverticulosis without  diverticulitis. No pelvic adenopathy or free fluid. No lytic or  blastic bone lesions.    There are two, biwy-gn-nfub fat-containing ventral hernias above the  umbilicus (series 2, images 13-15). There is another smaller ventral  hernia above the umbilicus (series 2, image 35) that contains fat. A  fourth fat-containing ventral hernia is present to the right of  midline at the level of the umbilicus (series 2, image 40). There is a  small fat-containing right inguinal hernia.       Impression       IMPRESSION:  1. Multiple abdominal wall hernias described above.  2. Cholelithiasis and diverticulosis.    LUZ CARTAGENA MD         So there is more hernias than we could feel.   Please follow up with me in the office to discuss the options again and decide which way we should proceed.      If you do have further questions please don t hesitate to call my assistant at  .  We do not have someone  "answering the phone all the time at my assistants number so if leave a message may take a day or so to get back to you.  So if more urgent then call the below number.    To make an appointment call [phone]: .   Sincerely,      Da Saunders M.D.      Office Visit     8/30/2019  [facility] [place] Saunders, Da Arora MD   Surgery   Ventral hernia without obstruction or gangrene   Dx   Hernia ; Referred by Heidy Weir MD   Reason for Visit    Progress Notes     Expand All Collapse All    Dear Frannie Carlson  I was asked to see this patient by Frannie Allen for please see below.  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  Is bulging in the incision site.   Has had some nausea no vomitting, diahrrea, constipation   splenectomy was 1978  Has had bulging for 40 years.   Has had c sections and a midline incision for removal of large ovary and removal of appendectomy.  Had large amount of scar tissue for her ovary removal.   This was done last May.      HPI:  Patient is a 68 year old female  with complaints see above  The patient noticed the symptoms about 40 years ago.    Patient has family history of hernia problems son  nothing makes the episode better.        Review Of Systems  Respiratory: copd  Cardiovascular: has had 4 stents placed had a mild MI  Gastrointestinal: nausea  Endocrine: negative  :  negative     10 Point review of systems all others are negative.   /73   Pulse 69   Ht 1.518 m (4' 11.76\")   Wt 61.7 kg (136 lb)   BMI 26.77 kg/m       Past Medical History        Past Medical History:   Diagnosis Date     CAD (coronary artery disease) 09/26/2014     s/p 4 DES 2014     COPD (chronic obstructive pulmonary disease) (H)       Family history of sudden cardiac death (SCD)       GERD (gastroesophageal reflux disease) 2/4/2010     H/O idiopathic thrombocytopenic purpura       History of blood " transfusion 2005     x 2     History of Graves' disease 1978     History of myocardial infarction 2014     History of sepsis 2015     Hyperlipidemia LDL goal <130 9/23/2009     Hypothyroidism 9/23/2009     Major depression       PONV (postoperative nausea and vomiting)              Past Surgical History         Past Surgical History:   Procedure Laterality Date     APPENDECTOMY         C/SECTION, LOW TRANSVERSE         x 4     CORONARY ANGIOGRAPHY ADULT ORDER         Total of 5 coronary angiograms     HC DRUG-ELUTING STENTS, SINGLE   2014     HYSTERECTOMY TOTAL ABD, JIMENA SALPINGO-OOPHORECTOMY, NODE DISSECTION, TUMOR DEBULKING, COMBINED N/A 4/30/2018     Procedure: COMBINED HYSTERECTOMY TOTAL ABDOMINAL, SALPINGO-OOPHORECTOMY, NODE DISSECTION, TUMOR DEBULKING;;  Surgeon: Melody Bolivar MD;  Location: [facility] OR     LAPAROSCOPIC HYSTERECTOMY TOTAL, JIMENA SALPINGO-OOPHORECTOMY, NODE DISSECTION, TUMOR STAGING, COMBINED N/A 4/30/2018     Procedure: COMBINED LAPAROSCOPIC HYSTERECTOMY TOTAL, SALPINGO-OOPHORECTOMY, NODE DISSECTION, TUMOR STAGING;  Diagnostic Laparoscopy, Laparotomy, Extensive Lysis of Adhesions, Open Bilateral Salpingo-Oophorectomy, Partial Omentectomy, Anesthesia Block;  Surgeon: Melody Bolivar MD;  Location: [facility] OR     SPLENECTOMY   1978     ITP     TUBAL LIGATION         x2            Social History   Social History            Socioeconomic History     Marital status:        Spouse name: Not on file     Number of children: Not on file     Years of education: Not on file     Highest education level: Not on file   Occupational History     Not on file   Social Needs     Financial resource strain: Not on file     Food insecurity:       Worry: Not on file       Inability: Not on file     Transportation needs:       Medical: Not on file       Non-medical: Not on file   Tobacco Use     Smoking status: Former Smoker       Packs/day: 0.25       Years: 42.00       Pack years: 10.50       Types: Cigarettes        "Last attempt to quit: 2013       Years since quittin.1     Smokeless tobacco: Never Used   Substance and Sexual Activity     Alcohol use: Yes       Alcohol/week: 3.6 oz       Types: 6 Cans of beer per week       Comment: A couple times a week, \"three beers max\"     Drug use: No     Sexual activity: Yes       Partners: Male       Birth control/protection: Post-menopausal, Female Surgical   Lifestyle     Physical activity:       Days per week: Not on file       Minutes per session: Not on file     Stress: Not on file   Relationships     Social connections:       Talks on phone: Not on file       Gets together: Not on file       Attends Restorationism service: Not on file       Active member of club or organization: Not on file       Attends meetings of clubs or organizations: Not on file       Relationship status: Not on file     Intimate partner violence:       Fear of current or ex partner: Not on file       Emotionally abused: Not on file       Physically abused: Not on file       Forced sexual activity: Not on file   Other Topics Concern     Parent/sibling w/ CABG, MI or angioplasty before 65F 55M? Not Asked   Social History Narrative     Not on file            Current Outpatient Prescriptions          Current Outpatient Medications   Medication Sig Dispense Refill     albuterol (PROAIR HFA/PROVENTIL HFA/VENTOLIN HFA) 108 (90 Base) MCG/ACT inhaler Inhale 2 puffs into the lungs every 6 hours as needed for shortness of breath / dyspnea 2 Inhaler 2     aspirin EC 81 MG tablet Take 81 mg by mouth every morning          buPROPion (WELLBUTRIN SR) 200 MG 12 hr tablet Take 1 tablet (200 mg) by mouth 2 times daily Need to see MD for further refills 60 tablet 2     diclofenac (VOLTAREN) 1 % GEL topical gel Apply  2 grams to hands four times daily using enclosed dosing card. 100 g 0     docusate sodium (COLACE) 100 MG tablet Take 100 mg by mouth daily (Patient not taking: Reported on 2019) 60 tablet 0     isosorbide " "mononitrate (IMDUR) 30 MG 24 hr tablet Take 2 tablets (60 mg) by mouth daily 180 tablet 1     levothyroxine (SYNTHROID/LEVOTHROID) 50 MCG tablet Take 1 tablet (50 mcg) by mouth daily 90 tablet 3     LORazepam (ATIVAN) 0.5 MG tablet TAKE 1/2-1 TABLET BY MOUTH EVERY 8 HOURS AS NEEDED FOR ANXIETY. DO NOT OPERATE A VEHICLE AFTER TAKING THIS MEDICATION 1 tablet 0     metoprolol (TOPROL-XL) 50 MG 24 hr tablet Take 1 tablet (50 mg) by mouth daily 90 tablet 3     nitroglycerin (NITROSTAT) 0.4 MG sublingual tablet Place 1 tablet (0.4 mg) under the tongue every 5 minutes as needed for chest pain 25 tablet 11     order for DME Equipment being ordered: incentive spirometry (Patient not taking: Reported on 7/25/2019) 1 Device 0     oxyCODONE-acetaminophen (PERCOCET) 5-325 MG per tablet Take 1 tablet by mouth every 6 hours as needed for pain (Patient not taking: Reported on 8/27/2019) 15 tablet 0     pantoprazole (PROTONIX) 40 MG EC tablet Take 1 tablet (40 mg) by mouth daily 180 tablet 0     pramipexole (MIRAPEX) 0.125 MG tablet Take 1 tablet (0.125 mg) by mouth At Bedtime 90 tablet 3     rosuvastatin (CRESTOR) 20 MG tablet Take 20 mg by mouth         sertraline (ZOLOFT) 25 MG tablet Take 1 tablet (25 mg) by mouth daily Need to see MD for further refills 30 tablet 3     simethicone (MYLICON) 80 MG chewable tablet Take 1 tablet (80 mg) by mouth 4 times daily as needed for cramping (gas) (Patient not taking: Reported on 7/25/2019) 20 tablet 0     umeclidinium-vilanterol (ANORO ELLIPTA) 62.5-25 MCG/INH oral inhaler Inhale 1 puff into the lungs daily (Patient not taking: Reported on 8/27/2019) 1 Inhaler 5            Above was reviewed  Physical exam: /73   Pulse 69   Ht 1.518 m (4' 11.76\")   Wt 61.7 kg (136 lb)   BMI 26.77 kg/m     Patient able to get up on table without difficulty.   Patient is alert and orientated.   Head eyes, nose and mouth within normal limits.  No supraclavicular or cervical adenopathy " palpated.  Thyroid within normal limits.  No carotid bruits auscultated.  Lungs are clear to auscultation  Heart is regular rate and rhythm with no murmur or thrills noted.  No costal vertebral angle tenderness noted.  Abdomen is abdomen is soft without significant tenderness, masses, organomegaly or guarding  bowel sounds are positive and no caput medusa noted.  Has a hernia one medial aspect of her left upper quadrant incision and one about 2 finger above the umbilicus. I did not feel an obvious one on the laeral splenectomy incision.        Skin was warm and pink  Normal Affect     Lower extremity edema is not present.        Assessment: Has a hernia one medial aspect of her left upper quadrant incision and one about 2 finger above the umbilicus.  Probably 2 close together.  I did not feel an obvious one on the laeral splenectomy incision.    Patient had a lot of adhesions on her last surgery.   Plan to do will need to do a ct scan to see if other hernias.  especially the lateral splenectomy site.   If only the 3 sites then would do open.  If more than would consider laparoscopic robotic surgery.  But patient had large amount of adhesions in May that were taken down.       Risks of surgery include damage to nerves, bleeding, infection, damage to  Vessels, recurrence.  Although mesh is a better long term repair if it gets infected it must be removed.   If the patient has any bacterial infection the week before and is seen by their doctor and started on antibiotics, I can probably still do the surgery if they are vastly improved by the time of surgery, but if the infection starts closer to the surgery date it will be better to cancel and reschedule to a later date.  A cough will also be hard on the repair and uncomfortable post operative.  If the patient is a smoker I did discuss increase risk of recurrence and more pain with the cough.  If the patient is willing to quit smoking would encourage to do so and start  at least a week before surgery.  However, if patient is not going to quit then must understand that his repair is more likely to fail.     Risks of surgery discussed including, but not limited to bleeding, infection, recurrence, damage to nerves and what is in the hernia sac.  Risks of anesthesia also discussed.     Discussed massaging hernia back in and using ice if becomes more painful.  If not able to reduce then go to emergency room.  Also discussed hernia belt to use until able to get in for surgery.     PLEASE follow up with ME AFTER YOUR CGT SCAN.   Time spent with the patient with greater that 50% of the time in discussion was 30 minutes.  In discussing the options .        Da Saunders MD          Again, thank you for allowing me to participate in the care of your patient.        Sincerely,        Da Saunders MD

## 2019-09-10 NOTE — PATIENT INSTRUCTIONS
Patient is here to discuss her ct scan and the 4 hernias seen on ct scan.     There are two, snwq-vp-gxhz fat-containing ventral hernias above the  umbilicus (series 2, images 13-15). There is another smaller ventral  hernia above the umbilicus (series 2, image 35) that contains fat. A  fourth fat-containing ventral hernia is present to the right of  midline at the level of the umbilicus (series 2, image 40). There is a  small fat-containing right inguinal hernia.    Patient has burning in the upper abdomen and nausea after bending over and cleaning .  Has fairly discomfort in the upper abdomen.     Assessment: Has a hernia one medial aspect of her left upper quadrant incision and one about 2 finger above the umbilicus.  Probably 2 close together.  I did not feel an obvious one on the laeral splenectomy incision.    Patient had a lot of adhesions on her last surgery.     I think that doing this open thru 1 to 2 incisions at the medial splenectomy site and then same with small incision at the area just above the umbilicus and then would do the right inguinal hernia repair with mesh open.  She is definitely tender with her right inguinal hernia on palpation.  Did not look first time due to her with no complains of  Of pain in this area, but ct scan did show hernia there.     Risks of surgery include damage to nerves, bleeding, infection, damage to  Vessels, recurrence.  Although mesh is a better long term repair if it gets infected it must be removed.   If the patient has any bacterial infection the week before and is seen by their doctor and started on antibiotics, I can probably still do the surgery if they are vastly improved by the time of surgery, but if the infection starts closer to the surgery date it will be better to cancel and reschedule to a later date.  A cough will also be hard on the repair and uncomfortable post operative.  If the patient is a smoker I did discuss increase risk of recurrence and more  pain with the cough.  If the patient is willing to quit smoking would encourage to do so and start at least a week before surgery.  However, if patient is not going to quit then must understand that his repair is more likely to fail.     Risks of surgery discussed including, but not limited to bleeding, infection, recurrence, damage to nerves and what is in the hernia sac.  Risks of anesthesia also discussed.     Discussed massaging hernia back in and using ice if becomes more painful.  If not able to reduce then go to emergency room.  Also discussed hernia belt to use until able to get in for surgery.   would do at the hospital in case need to open more.     HERNIORRHAPHY DISCHARGE INSTRUCTIONS  DR. NEEL JON    1. You may resume your regular diet when you feel you are ready to. DO NOT drink alcoholic beverages for 24 hours or while you are taking prescription medication.    2. Limit your activities for the first 48 hours. Gradually, increase them as tolerated. You may use stairs. I encourage you to walk as tolerated. No lifting greater that 20 pounds for 3-6 weeks.    3. You will have some discomfort at the incision sites. This is expected. This should improve over the next 2-3 days. Ice and pain medication will help with this pain. Use prescribed pain medication as instructed.    4. Bruising and mild swelling is normal after surgery. For males it is common to have bruising going into the penis and scrotum. The area below and around the incision(s) will be hard and elevated. This is normal. I call it the healing ridge. This will resolve slowly over the next several months. If you feel the pain is increasing and cannot explain it by increasing activity please call us at [phone].    5. The dressing will often have some blood on it. You may shower 24 hours after surgery. Clean gently over incision site. If clear plastic covering or steri-strip comes off and there is still some bleeding or drainage then  cover with gauze or band-aid. If no bleeding, there is no reason to cover site. The abdominal binder may be removed after 24 hours after surgery. You may continue to wear it however for comfort. I suggest  you wear an old t-shirt under the abdominal binder for a more comfortable wear.    6. Avoid Aspirin for the first 72 hours after the procedure. This medication may increase the tendency to bleed.    7. Use the following medications (in addition to your normal meds) as shown:  a. Percocet 5 mg 1-2 every 6 hours as needed for severe pain. This contains 325 mg of Tylenol (acetaminophen) per tablet.  Please do not take more than 4 grams of Tylenol (acetaminophen) per day. For example, you may take 1 Percocet and 1 Tylenol, or 2 Percocet and no Tylenol, or 2 Tylenol and no Percocet every 6 hours.  b. Tylenol (acetaminophen) 500 mg every 6 hours as needed for mild pain. Do not take more than 1000 mg every 6 hours. (see above).  c. Motrin (ibuprofen) 200-800 mg every 6 hours as needed for mild to moderate pain. Take with food.     8. Notify Dr. Saunders's clinic at [phone] if:    Your discomfort is not relieved by your pain medication.    You have signs of infection such as temperature above 100.5 degrees orally, chills, or increasing daily discomfort.    Incision site is becoming more red and/or there is purulent drainage.    You have questions or concerns.    9. Please call [phone] to schedule a follow up appointment in about 2 weeks.    10. When taking narcotics (pain medication more than Tylenol [acetaminophen] and Motrin [ibuprofen]) it is important to keep your stools soft to avoid constipation and pain with straining. This is best done by drinking fluids (non-alcoholic and non-caffeinated) and taking a stool softener (i.e. Metamucil or milk of magnesia). You may be able to use non-narcotics for pain relief especially by the 3rd post- operative day. Tylenol (acetaminophen) 500 mg every 6 hours and/or  Motrin (ibuprofen) 200-800 mg every 6 hours. Please do not take more than 4 grams of Tylenol (acetaminophen) per day. Remember your Percocet does have Tylenol (acetaminophen) already in it. Please take Motrin (ibuprofen) with food to help protect the stomach. If you have a history of stomach ulcers or stomach problems, do not take Motrin (ibuprofen).     11. Do not drive or operate heavy machinery for 24 hours after surgery or when taking narcotics. You may resume driving when feel that you can safely avoid an accident and are not taking narcotics. This is usually 5 to 7 days after surgery. You should not be alone for 24 hours after surgery.    12. Have milk of magnesia available at home so that when you take the pain medications you take 1-2 teaspoons a day,  to help reduce problems with constipation.

## 2019-09-11 ENCOUNTER — TELEPHONE (OUTPATIENT)
Dept: SURGERY | Facility: CLINIC | Age: 69
End: 2019-09-11

## 2019-09-11 NOTE — TELEPHONE ENCOUNTER
Type of surgery: Multiple ventral hernia repairs open with mesh and right inguinal hernia repair with mesh CPT code 72964, 99721  Ventral hernia without obstruction or gangrene [K43.9]  - Primary       Right inguinal hernia [K40.90]       Location of surgery: [facility]  Date and time of surgery: 10/30/2019 / 9:15 am  Surgeon: Chip   Pre-Op Appt Date: 10/21/2019  Post-Op Appt Date: 11/12/2019   Packet sent out: Yes  Pre-cert/Authorization completed:  No prior auth required per [employer]. Reference number is [id number]    Date: 09/13/2019    Sent to  and was received. 09/30/2019    Insurance valid 10/01/2019

## 2019-10-21 ENCOUNTER — OFFICE VISIT (OUTPATIENT)
Dept: FAMILY MEDICINE | Facility: CLINIC | Age: 69
End: 2019-10-21
Payer: COMMERCIAL

## 2019-10-21 VITALS
SYSTOLIC BLOOD PRESSURE: 122 MMHG | OXYGEN SATURATION: 98 % | RESPIRATION RATE: 16 BRPM | HEIGHT: 59 IN | TEMPERATURE: 97.5 F | HEART RATE: 76 BPM | BODY MASS INDEX: 27.62 KG/M2 | DIASTOLIC BLOOD PRESSURE: 68 MMHG | WEIGHT: 137 LBS

## 2019-10-21 DIAGNOSIS — F41.9 ANXIETY: ICD-10-CM

## 2019-10-21 DIAGNOSIS — Z01.818 PREOP GENERAL PHYSICAL EXAM: Primary | ICD-10-CM

## 2019-10-21 DIAGNOSIS — K21.9 GASTROESOPHAGEAL REFLUX DISEASE, ESOPHAGITIS PRESENCE NOT SPECIFIED: ICD-10-CM

## 2019-10-21 DIAGNOSIS — I25.10 CORONARY ARTERY DISEASE INVOLVING NATIVE CORONARY ARTERY OF NATIVE HEART WITHOUT ANGINA PECTORIS: ICD-10-CM

## 2019-10-21 DIAGNOSIS — K43.2 INCISIONAL HERNIA, WITHOUT OBSTRUCTION OR GANGRENE: ICD-10-CM

## 2019-10-21 DIAGNOSIS — F33.1 MODERATE EPISODE OF RECURRENT MAJOR DEPRESSIVE DISORDER (H): ICD-10-CM

## 2019-10-21 DIAGNOSIS — J44.9 CHRONIC OBSTRUCTIVE PULMONARY DISEASE, UNSPECIFIED COPD TYPE (H): ICD-10-CM

## 2019-10-21 LAB
CREAT SERPL-MCNC: 0.96 MG/DL (ref 0.52–1.04)
GFR SERPL CREATININE-BSD FRML MDRD: 61 ML/MIN/{1.73_M2}
HGB BLD-MCNC: 12.6 G/DL (ref 11.7–15.7)

## 2019-10-21 PROCEDURE — 99215 OFFICE O/P EST HI 40 MIN: CPT | Performed by: NURSE PRACTITIONER

## 2019-10-21 PROCEDURE — 82565 ASSAY OF CREATININE: CPT | Performed by: NURSE PRACTITIONER

## 2019-10-21 PROCEDURE — 85018 HEMOGLOBIN: CPT | Performed by: NURSE PRACTITIONER

## 2019-10-21 PROCEDURE — 93000 ELECTROCARDIOGRAM COMPLETE: CPT | Performed by: NURSE PRACTITIONER

## 2019-10-21 PROCEDURE — 36415 COLL VENOUS BLD VENIPUNCTURE: CPT | Performed by: NURSE PRACTITIONER

## 2019-10-21 RX ORDER — BUPROPION HYDROCHLORIDE 200 MG/1
200 TABLET, EXTENDED RELEASE ORAL 2 TIMES DAILY
Qty: 60 TABLET | Refills: 5 | Status: SHIPPED | OUTPATIENT
Start: 2019-10-21 | End: 2020-05-18

## 2019-10-21 RX ORDER — LORAZEPAM 0.5 MG/1
.25-.5 TABLET ORAL EVERY 8 HOURS PRN
Qty: 15 TABLET | Refills: 0 | Status: SHIPPED | OUTPATIENT
Start: 2019-10-21 | End: 2020-06-01

## 2019-10-21 RX ORDER — SERTRALINE HYDROCHLORIDE 25 MG/1
TABLET, FILM COATED ORAL
Qty: 30 TABLET | Refills: 5 | Status: SHIPPED | OUTPATIENT
Start: 2019-10-21 | End: 2020-05-18

## 2019-10-21 RX ORDER — PANTOPRAZOLE SODIUM 40 MG/1
40 TABLET, DELAYED RELEASE ORAL DAILY
Qty: 180 TABLET | Refills: 1 | Status: SHIPPED | OUTPATIENT
Start: 2019-10-21 | End: 2020-05-22

## 2019-10-21 ASSESSMENT — MIFFLIN-ST. JEOR: SCORE: 1062.06

## 2019-10-21 NOTE — PROGRESS NOTES
[facility]  [address] [address]  [phone]  Dept: [phone]    PRE-OP EVALUATION:  Today's date: 10/21/2019    Michela Cole (: 1950) presents for pre-operative evaluation assessment as requested by Dr. Saunders.  She requires evaluation and anesthesia risk assessment prior to undergoing surgery/procedure for treatment of hernias .    Proposed Surgery/ Procedure:  Multiple ventral hernia repairs open with mesh and right inguinal hernia repair with mesh   Date of Surgery/ Procedure: 10/30/19  Time of Surgery/ Procedure: 9:15 am  Hospital/Surgical Facility: [facility]    Primary Physician: Frannie Allen  Type of Anesthesia Anticipated: General    Patient has a Health Care Directive or Living Will:  YES     1. yes - Do you have a history of heart attack, stroke, stent, bypass or surgery on an artery in the head, neck, heart or legs? Stents x 4 to coronary arteries.  2. Yes - Do you ever have any pain or discomfort in your chest? Chronic.  Negative coronary angiogram in 2019  3. NO - Do you have a history of  Heart Failure?  4. Yes - Are you troubled by shortness of breath when: walking on the level, up a slight hill or at night?  Related to COPD, chronic and stable.  5. NO - Do you currently have a cold, bronchitis or other respiratory infection?  6. NO - Do you have a cough, shortness of breath or wheezing?  7.Yes - Do you sometimes get pains in the calves of your legs when you walk? Negative ESTER's done 3/2018, not worsening or changing.  8. NO - Do you or anyone in your family have previous history of blood clots?  9. NO - Do you or does anyone in your family have a serious bleeding problem such as prolonged bleeding following surgeries or cuts?  10. Yes - Have you ever had problems with anemia or been told to take iron pills? In , negative workup and treated with transfusion.  Recently stable.  11. NO - Have you had any abnormal blood  loss such as black, tarry or bloody stools, or abnormal vaginal bleeding?  12. Yes - Have you ever had a blood transfusion? Due to anemia in 2013.  13.Yes - Have you or any of your relatives ever had problems with anesthesia? Nausea- patient has post op nausea and vomiting.  14. NO - Do you have sleep apnea, excessive snoring or daytime drowsiness?  15. NO - Do you have any prosthetic heart valves?  16. NO - Do you have prosthetic joints?  17. NO - Is there any chance that you may be pregnant?    Swapna Edwards CNP     HPI:     HPI related to upcoming procedure: patient has several abdominal hernias and will undergo hernia repair.  Patient is not having pain to abdominal hernias at this time.  She does note nausea with certain movements and will undergo hernia repair to see if this improves nausea.      CAD - Patient has a longstanding history of moderate-severe CAD. Patient denies recent chest pain or NTG use, denies exercise induced dyspnea or PND. Angiogram done 2/2019, negative    COPD - Patient has a longstanding history of moderate-severe COPD . Patient has been doing well overall noting MEYERS and continues on medication regimen consisting of albuterol without adverse reactions or side effects.    DEPRESSION - Patient has a long history of Depression of moderate severity requiring medication for control with recent symptoms being stable..Current symptoms of depression include none.     HYPERLIPIDEMIA - Patient has a long history of significant Hyperlipidemia requiring medication for treatment with recent good control. Patient reports no problems or side effects with the medication.     HYPERTENSION - Patient has longstanding history of HTN , currently denies any symptoms referable to elevated blood pressure. Specifically denies chest pain, palpitations, dyspnea, orthopnea, PND or peripheral edema. Blood pressure readings have been in normal range. Current medication regimen is as listed below. Patient denies  any side effects of medication.       MEDICAL HISTORY:     Patient Active Problem List    Diagnosis Date Noted     S/P BSO (bilateral salpingo-oophorectomy) 04/30/2018     Priority: Medium     Moderate episode of recurrent major depressive disorder (H) 11/13/2017     Priority: Medium     Restless legs syndrome (RLS)      Priority: Medium     Anxiety 04/03/2017     Priority: Medium     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 04/03/2017     Priority: Medium     Stress 04/03/2017     Priority: Medium     Pelvic cyst 12/29/2015     Priority: Medium     Cyst of left ovary 11/03/2015     Priority: Medium     Health Care Home 05/14/2015     Priority: Medium       Status:  Unable to reach  Care Coordinator:  Janneth Orellana    See Letters for H Care Plan  Date:  May 14, 2015         CAD (coronary artery disease) 09/26/2014     Priority: Medium     Treated with multiple PCI       Fatigue 05/20/2014     Priority: Medium     Moderate major depression (H) 01/07/2013     Priority: Medium     COPD (chronic obstructive pulmonary disease) (H) 07/16/2012     Priority: Medium     Cataract 12/19/2011     Priority: Medium     Advanced directives, counseling/discussion 09/19/2011     Priority: Medium     Patient states has Advance Directive and will bring in a copy to clinic.       Vitamin D deficiency 02/04/2010     Priority: Medium      Past Medical History:   Diagnosis Date     CAD (coronary artery disease) 09/26/2014    s/p 4 MARQUES 2014     COPD (chronic obstructive pulmonary disease) (H)      Family history of sudden cardiac death (SCD)      GERD (gastroesophageal reflux disease) 2/4/2010     H/O idiopathic thrombocytopenic purpura      History of blood transfusion 2005    x 2     History of Graves' disease 1978     History of myocardial infarction 2014     History of sepsis 2015     Hyperlipidemia LDL goal <130 9/23/2009     Hypothyroidism 9/23/2009     Major depression      PONV (postoperative nausea and vomiting)      Past Surgical History:    Procedure Laterality Date     APPENDECTOMY       C/SECTION, LOW TRANSVERSE      x 4     CORONARY ANGIOGRAPHY ADULT ORDER      Total of 5 coronary angiograms     HC DRUG-ELUTING STENTS, SINGLE  2014     HYSTERECTOMY TOTAL ABD, JIMENA SALPINGO-OOPHORECTOMY, NODE DISSECTION, TUMOR DEBULKING, COMBINED N/A 4/30/2018    Procedure: COMBINED HYSTERECTOMY TOTAL ABDOMINAL, SALPINGO-OOPHORECTOMY, NODE DISSECTION, TUMOR DEBULKING;;  Surgeon: Melody Bolivar MD;  Location: [facility] OR     LAPAROSCOPIC HYSTERECTOMY TOTAL, JIMENA SALPINGO-OOPHORECTOMY, NODE DISSECTION, TUMOR STAGING, COMBINED N/A 4/30/2018    Procedure: COMBINED LAPAROSCOPIC HYSTERECTOMY TOTAL, SALPINGO-OOPHORECTOMY, NODE DISSECTION, TUMOR STAGING;  Diagnostic Laparoscopy, Laparotomy, Extensive Lysis of Adhesions, Open Bilateral Salpingo-Oophorectomy, Partial Omentectomy, Anesthesia Block;  Surgeon: Melody Bolivar MD;  Location: [facility] OR     SPLENECTOMY  1978    ITP     TUBAL LIGATION      x2     Current Outpatient Medications   Medication Sig Dispense Refill     albuterol (PROAIR HFA/PROVENTIL HFA/VENTOLIN HFA) 108 (90 Base) MCG/ACT inhaler Inhale 2 puffs into the lungs every 6 hours as needed for shortness of breath / dyspnea 2 Inhaler 2     aspirin EC 81 MG tablet Take 81 mg by mouth every morning        buPROPion (WELLBUTRIN SR) 200 MG 12 hr tablet Take 1 tablet (200 mg) by mouth 2 times daily 60 tablet 5     diclofenac (VOLTAREN) 1 % GEL topical gel Apply  2 grams to hands four times daily using enclosed dosing card. 100 g 0     isosorbide mononitrate (IMDUR) 30 MG 24 hr tablet Take 2 tablets (60 mg) by mouth daily 180 tablet 1     levothyroxine (SYNTHROID/LEVOTHROID) 50 MCG tablet Take 1 tablet (50 mcg) by mouth daily 90 tablet 3     LORazepam (ATIVAN) 0.5 MG tablet Take 0.5-1 tablets (0.25-0.5 mg) by mouth every 8 hours as needed for anxiety 15 tablet 0     metoprolol (TOPROL-XL) 50 MG 24 hr tablet Take 1 tablet (50 mg) by mouth daily 90 tablet 3     nitroglycerin  "(NITROSTAT) 0.4 MG sublingual tablet Place 1 tablet (0.4 mg) under the tongue every 5 minutes as needed for chest pain 25 tablet 11     pantoprazole (PROTONIX) 40 MG EC tablet Take 1 tablet (40 mg) by mouth daily 180 tablet 1     rosuvastatin (CRESTOR) 20 MG tablet Take 20 mg by mouth       sertraline (ZOLOFT) 25 MG tablet Take 1 tablet (25 mg) by mouth daily 30 tablet 5     umeclidinium-vilanterol (ANORO ELLIPTA) 62.5-25 MCG/INH oral inhaler Inhale 1 puff into the lungs daily (Patient not taking: Reported on 2019) 1 Inhaler 5     OTC products: None, except as noted above    Allergies   Allergen Reactions     Sulfa Drugs Rash and Hives     Plavix [Clopidogrel] Other (See Comments)     Other reaction(s): Other - Describe In Comment Field  Not effective at preventing clots  P2Y12 testing done at [facility] in 2014 indicates a non responder to Plavix     Sulfasalazine Rash      Latex Allergy: NO    Social History     Tobacco Use     Smoking status: Former Smoker     Packs/day: 0.25     Years: 42.00     Pack years: 10.50     Types: Cigarettes     Last attempt to quit: 2013     Years since quittin.3     Smokeless tobacco: Never Used   Substance Use Topics     Alcohol use: Yes     Alcohol/week: 6.0 standard drinks     Types: 6 Cans of beer per week     Comment: A couple times a week, \"three beers max\"     History   Drug Use No       REVIEW OF SYSTEMS:   Constitutional, neuro, ENT, endocrine, pulmonary, cardiac, gastrointestinal, genitourinary, musculoskeletal, integument and psychiatric systems are negative, except as otherwise noted.    EXAM:   /68   Pulse 76   Temp 97.5  F (36.4  C) (Oral)   Resp 16   Ht 1.499 m (4' 11\")   Wt 62.1 kg (137 lb)   SpO2 98%   BMI 27.67 kg/m      GENERAL APPEARANCE: healthy, alert and no distress     EYES: EOMI, PERRL     HENT: ear canals and TM's normal and nose and mouth without ulcers or lesions     NECK: no adenopathy, no asymmetry, masses, or scars and thyroid " normal to palpation     RESP: lungs clear to auscultation - no rales, rhonchi or wheezes     CV: regular rates and rhythm, normal S1 S2, no S3 or S4 and no murmur, click or rub     ABDOMEN:  soft, nontender, no HSM or masses and bowel sounds normal     MS: extremities normal- no gross deformities noted, no evidence of inflammation in joints, FROM in all extremities.     SKIN: no suspicious lesions or rashes     NEURO: Normal strength and tone, sensory exam grossly normal, mentation intact and speech normal     PSYCH: mentation appears normal. and affect normal/bright     LYMPHATICS: No cervical adenopathy    DIAGNOSTICS:     EKG: appears normal, NSR, normal axis, normal intervals, no acute ST/T changes c/w ischemia, no LVH by voltage criteria, unchanged from previous tracings  Labs Resulted Today:   Results for orders placed or performed in visit on 10/21/19   Hemoglobin   Result Value Ref Range    Hemoglobin 12.6 11.7 - 15.7 g/dL   Creatinine   Result Value Ref Range    Creatinine 0.96 0.52 - 1.04 mg/dL    GFR Estimate 61 >60 mL/min/[1.73_m2]    GFR Estimate If Black 71 >60 mL/min/[1.73_m2]       Recent Labs   Lab Test 05/05/18  0524 05/01/18  0518 04/30/18  1845  10/23/15   HGB  --  11.6* 12.4   < >  --     259 267   < >  --    INR  --   --  0.98  --  1.1   NA  --  142 140   < >  --    POTASSIUM  --  3.9 3.8   < >  --    CR 0.74 0.94 0.82   < >  --     < > = values in this interval not displayed.        IMPRESSION:   Reason for surgery/procedure: Incisional hernia  Diagnosis/reason for consult: Management of comorbid conditions and preoperative exam.      The proposed surgical procedure is considered INTERMEDIATE risk.    REVISED CARDIAC RISK INDEX  The patient has the following serious cardiovascular risks for perioperative complications such as (MI, PE, VFib and 3  AV Block):  Coronary Artery Disease (MI, positive stress test, angina, Qs on EKG)  INTERPRETATION: 1 risks: Class II (low risk - 0.9%  complication rate)    The patient has the following additional risks for perioperative complications:  No identified additional risks      ICD-10-CM    1. Preop general physical exam Z01.818 EKG 12-lead complete w/read - Clinics     Hemoglobin     Creatinine   2. Incisional hernia, without obstruction or gangrene K43.2    3. Chronic obstructive pulmonary disease, unspecified COPD type (H) J44.9    4. Moderate episode of recurrent major depressive disorder (H) F33.1 buPROPion (WELLBUTRIN SR) 200 MG 12 hr tablet   5. Coronary artery disease involving native coronary artery of native heart without angina pectoris I25.10 Hemoglobin     Creatinine   6. Anxiety F41.9 LORazepam (ATIVAN) 0.5 MG tablet     sertraline (ZOLOFT) 25 MG tablet   7. Gastroesophageal reflux disease, esophagitis presence not specified K21.9 pantoprazole (PROTONIX) 40 MG EC tablet       RECOMMENDATIONS:     --Consult hospital rounder / IM to assist post-op medical management    Cardiovascular Risk  Performs 4 METs exercise without symptoms (Light housework (dusting, washing dishes) and Climb a flight of stairs) .   Patient is already on a Beta Blocker. Continue Betablocker therapy after surgery, using Beta blocker order set as necessary for NPO status.      Pulmonary Risk  Incentive spirometry post op  Respiratory Therapy (Respiratory Care IP Consult)  post op  NG tube decompression if abdominal distension or significant vomiting       --Patient is to take all scheduled medications on the day of surgery EXCEPT for modifications listed below.    Anticoagulant or Antiplatelet Medication Use  ASPIRIN: Discontinue ASA 7-10 days prior to procedure to reduce bleeding risk.  It should be resumed post-operatively.        APPROVAL GIVEN to proceed with proposed procedure, without further diagnostic evaluation       Signed Electronically by: JOSE Ramsey CNP    Copy of this evaluation report is provided to requesting physician.    [facility] Preop  Guidelines    Revised Cardiac Risk Index

## 2019-10-21 NOTE — PATIENT INSTRUCTIONS
Stop aspirin, ibuprofen, aleve, fish oil 7 days before surgery.   Tylenol is okay for pain.  On the morning of surgery okay to use albuterol, bupropion, imdur, metoprolol, pantoprazole, sertraline with small sips of water the morning of surgery.      Before Your Surgery      Call your surgeon if there is any change in your health. This includes signs of a cold or flu (such as a sore throat, runny nose, cough, rash or fever).    Do not smoke, drink alcohol or take over the counter medicine (unless your surgeon or primary care doctor tells you to) for the 24 hours before and after surgery.    If you take prescribed drugs: Follow your doctor s orders about which medicines to take and which to stop until after surgery.    Eating and drinking prior to surgery: follow the instructions from your surgeon    Take a shower or bath the night before surgery. Use the soap your surgeon gave you to gently clean your skin. If you do not have soap from your surgeon, use your regular soap. Do not shave or scrub the surgery site.  Wear clean pajamas and have clean sheets on your bed.

## 2019-10-21 NOTE — LETTER
[address] [place] [address]    October 22, 2019    Michela Cole  [address] [facility] [address]          Dear Michela,    No anemia.   Normal kidney function.     If you have any questions please feel free to contact [phone] or myself via [employer].     Enclosed is a copy of your results.     Results for orders placed or performed in visit on 10/21/19   Hemoglobin   Result Value Ref Range    Hemoglobin 12.6 11.7 - 15.7 g/dL   Creatinine   Result Value Ref Range    Creatinine 0.96 0.52 - 1.04 mg/dL    GFR Estimate 61 >60 mL/min/[1.73_m2]    GFR Estimate If Black 71 >60 mL/min/[1.73_m2]       If you have any questions or concerns, please call myself or my nurse at [phone].      Sincerely,        Swapna Edwards CNP/barrett

## 2019-10-21 NOTE — RESULT ENCOUNTER NOTE
Dear Michela,    Your recent test results are attached.      No anemia.  Normal kidney function.    If you have any questions please feel free to contact [phone] or myself via [employer].    Sincerely,  Swapna Edwards, CNP

## 2019-11-18 ENCOUNTER — OFFICE VISIT (OUTPATIENT)
Dept: OPTOMETRY | Facility: CLINIC | Age: 69
End: 2019-11-18
Payer: COMMERCIAL

## 2019-11-18 DIAGNOSIS — H52.223 REGULAR ASTIGMATISM OF BOTH EYES: ICD-10-CM

## 2019-11-18 DIAGNOSIS — H52.03 HYPERMETROPIA, BILATERAL: ICD-10-CM

## 2019-11-18 DIAGNOSIS — H25.813 COMBINED FORMS OF AGE-RELATED CATARACT OF BOTH EYES: ICD-10-CM

## 2019-11-18 DIAGNOSIS — H52.4 PRESBYOPIA: ICD-10-CM

## 2019-11-18 DIAGNOSIS — Z01.01 ENCOUNTER FOR EXAMINATION OF EYES AND VISION WITH ABNORMAL FINDINGS: Primary | ICD-10-CM

## 2019-11-18 PROCEDURE — 92004 COMPRE OPH EXAM NEW PT 1/>: CPT | Performed by: OPTOMETRIST

## 2019-11-18 PROCEDURE — 92015 DETERMINE REFRACTIVE STATE: CPT | Performed by: OPTOMETRIST

## 2019-11-18 ASSESSMENT — VISUAL ACUITY
OD_SC: 20/200
OD_SC: 20/200
OD_CC+: -2
OD_CC: 20/25
OD_CC: 20/60
METHOD: SNELLEN - LINEAR
OS_CC: 20/30
OS_CC: 20/80
OS_SC: 20/200
CORRECTION_TYPE: GLASSES
OS_SC: 20/250

## 2019-11-18 ASSESSMENT — CONF VISUAL FIELD
OD_NORMAL: 1
OS_NORMAL: 1

## 2019-11-18 ASSESSMENT — CUP TO DISC RATIO
OD_RATIO: 0.35
OS_RATIO: 0.35

## 2019-11-18 ASSESSMENT — EXTERNAL EXAM - LEFT EYE: OS_EXAM: NORMAL

## 2019-11-18 ASSESSMENT — REFRACTION_MANIFEST
OD_AXIS: 167
OS_AXIS: 003
OD_ADD: +2.75
OS_ADD: +2.75
OD_SPHERE: +1.50
OD_CYLINDER: +0.75
OS_CYLINDER: +1.25
OS_SPHERE: +1.00

## 2019-11-18 ASSESSMENT — EXTERNAL EXAM - RIGHT EYE: OD_EXAM: NORMAL

## 2019-11-18 ASSESSMENT — REFRACTION_WEARINGRX
OD_SPHERE: +1.50
SPECS_TYPE: BIFOCAL
OD_ADD: +2.50
OS_AXIS: 179
OD_AXIS: 165
OS_CYLINDER: +0.75
OD_CYLINDER: +0.75
OS_SPHERE: +1.00
OS_ADD: +2.50

## 2019-11-18 ASSESSMENT — TONOMETRY
OD_IOP_MMHG: 18
IOP_METHOD: APPLANATION
OS_IOP_MMHG: 18

## 2019-11-18 NOTE — PATIENT INSTRUCTIONS
You have the start of mild cataracts.  You may notice some blurred vision or glare with night driving.  It is important that you wear good sunglasses to protect your eyes from the ultraviolet light from the sun.     Michela was advised of today's exam findings.  Fill glasses prescription  Allow 2 weeks to adapt to change in glasses  Wear glasses full time  Copy of glasses Rx provided today.  Return in 1 year for eye exam, or sooner if needed.    The effects of the dilating drops last for 4- 6 hours.  You will be more sensitive to light and vision will be blurry up close.  Mydriatic sunglasses were given if needed.      Rajesh Valiente O.D.  [facility]  [address] [place] [address]. EMELINA Wiley  [id number]    [phone]

## 2019-11-18 NOTE — LETTER
11/18/2019         RE: Michela Cole  [address] [facility]  [place] [address]        Dear Colleague,    Thank you for referring your patient, Michela Cole, to the [facility]. Please see a copy of my visit note below.    Chief Complaint   Patient presents with     Annual Eye Exam      Accompanied by self  Last Eye Exam: 4 years ago  Dilated Previously: Yes    What are you currently using to see?  Glasses-4 years old       Distance Vision Acuity: Noticed gradual change in both eyes    Near Vision Acuity: Not satisfied     Eye Comfort: good  Do you use eye drops? 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       OBJECTIVE: See Ophthalmology exam    ASSESSMENT:    ICD-10-CM    1. Encounter for examination of eyes and vision with abnormal findings Z01.01 EYE EXAM (SIMPLE-NONBILLABLE)   2. Combined forms of age-related cataract of both eyes H25.813 EYE EXAM (SIMPLE-NONBILLABLE)   3. Hypermetropia, bilateral H52.03 EYE EXAM (SIMPLE-NONBILLABLE)     REFRACTION   4. Regular astigmatism of both eyes H52.223 EYE EXAM (SIMPLE-NONBILLABLE)     REFRACTION   5. Presbyopia H52.4 EYE EXAM (SIMPLE-NONBILLABLE)     REFRACTION      PLAN:     Patient Instructions   You have the start of mild cataracts.  You may notice some blurred vision or glare with night driving.  It is important that you wear good sunglasses to protect your eyes from the ultraviolet light from the sun.     Michela was advised of today's exam findings.  Fill glasses prescription  Allow 2 weeks to adapt to change in glasses  Wear glasses full time  Copy of glasses Rx provided today.  Return in 1 year for eye exam, or sooner if needed.    The effects of the dilating drops last for 4- 6 hours.  You will be more sensitive to light and vision will be blurry up close.  Mydriatic sunglasses were given if needed.      Rajesh  NIRALI Valiente O.D.  [address] [place]  [address]    [phone]             Again, thank you for allowing me to participate in the care of your patient.        Sincerely,        Rajesh Valiente OD

## 2019-11-18 NOTE — PROGRESS NOTES
Chief Complaint   Patient presents with     Annual Eye Exam      Accompanied by self  Last Eye Exam: 4 years ago  Dilated Previously: Yes    What are you currently using to see?  Glasses-4 years old       Distance Vision Acuity: Noticed gradual change in both eyes    Near Vision Acuity: Not satisfied     Eye Comfort: good  Do you use eye drops? 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       OBJECTIVE: See Ophthalmology exam    ASSESSMENT:    ICD-10-CM    1. Encounter for examination of eyes and vision with abnormal findings Z01.01 EYE EXAM (SIMPLE-NONBILLABLE)   2. Combined forms of age-related cataract of both eyes H25.813 EYE EXAM (SIMPLE-NONBILLABLE)   3. Hypermetropia, bilateral H52.03 EYE EXAM (SIMPLE-NONBILLABLE)     REFRACTION   4. Regular astigmatism of both eyes H52.223 EYE EXAM (SIMPLE-NONBILLABLE)     REFRACTION   5. Presbyopia H52.4 EYE EXAM (SIMPLE-NONBILLABLE)     REFRACTION      PLAN:     Patient Instructions   You have the start of mild cataracts.  You may notice some blurred vision or glare with night driving.  It is important that you wear good sunglasses to protect your eyes from the ultraviolet light from the sun.     Michela was advised of today's exam findings.  Fill glasses prescription  Allow 2 weeks to adapt to change in glasses  Wear glasses full time  Copy of glasses Rx provided today.  Return in 1 year for eye exam, or sooner if needed.    The effects of the dilating drops last for 4- 6 hours.  You will be more sensitive to light and vision will be blurry up close.  Mydriatic sunglasses were given if needed.      Rajesh Valiente O.D.  [address] [place]  [address]    [phone]

## 2019-11-19 ENCOUNTER — OFFICE VISIT (OUTPATIENT)
Dept: SURGERY | Facility: CLINIC | Age: 69
End: 2019-11-19
Payer: COMMERCIAL

## 2019-11-19 VITALS
BODY MASS INDEX: 27.01 KG/M2 | SYSTOLIC BLOOD PRESSURE: 123 MMHG | WEIGHT: 134 LBS | HEART RATE: 76 BPM | HEIGHT: 59 IN | DIASTOLIC BLOOD PRESSURE: 75 MMHG

## 2019-11-19 DIAGNOSIS — R10.11 RUQ ABDOMINAL PAIN: Primary | ICD-10-CM

## 2019-11-19 LAB
ALBUMIN SERPL-MCNC: 3.6 G/DL (ref 3.4–5)
ALP SERPL-CCNC: 95 U/L (ref 40–150)
ALT SERPL W P-5'-P-CCNC: 32 U/L (ref 0–50)
AST SERPL W P-5'-P-CCNC: 28 U/L (ref 0–45)
BASOPHILS # BLD AUTO: 0 10E9/L (ref 0–0.2)
BASOPHILS NFR BLD AUTO: 0.4 %
BILIRUB DIRECT SERPL-MCNC: <0.1 MG/DL (ref 0–0.2)
BILIRUB SERPL-MCNC: 0.2 MG/DL (ref 0.2–1.3)
DIFFERENTIAL METHOD BLD: ABNORMAL
EOSINOPHIL # BLD AUTO: 0.3 10E9/L (ref 0–0.7)
EOSINOPHIL NFR BLD AUTO: 5.7 %
ERYTHROCYTE [DISTWIDTH] IN BLOOD BY AUTOMATED COUNT: 14.5 % (ref 10–15)
HCT VFR BLD AUTO: 36 % (ref 35–47)
HGB BLD-MCNC: 11.4 G/DL (ref 11.7–15.7)
LIPASE SERPL-CCNC: 205 U/L (ref 73–393)
LYMPHOCYTES # BLD AUTO: 1.5 10E9/L (ref 0.8–5.3)
LYMPHOCYTES NFR BLD AUTO: 27.1 %
MCH RBC QN AUTO: 31.1 PG (ref 26.5–33)
MCHC RBC AUTO-ENTMCNC: 31.7 G/DL (ref 31.5–36.5)
MCV RBC AUTO: 98 FL (ref 78–100)
MONOCYTES # BLD AUTO: 0.7 10E9/L (ref 0–1.3)
MONOCYTES NFR BLD AUTO: 12.8 %
NEUTROPHILS # BLD AUTO: 3 10E9/L (ref 1.6–8.3)
NEUTROPHILS NFR BLD AUTO: 54 %
PLATELET # BLD AUTO: 657 10E9/L (ref 150–450)
PROT SERPL-MCNC: 7.8 G/DL (ref 6.8–8.8)
RBC # BLD AUTO: 3.67 10E12/L (ref 3.8–5.2)
WBC # BLD AUTO: 5.5 10E9/L (ref 4–11)

## 2019-11-19 PROCEDURE — 36415 COLL VENOUS BLD VENIPUNCTURE: CPT | Performed by: SURGERY

## 2019-11-19 PROCEDURE — 85025 COMPLETE CBC W/AUTO DIFF WBC: CPT | Performed by: PATHOLOGY

## 2019-11-19 PROCEDURE — 99215 OFFICE O/P EST HI 40 MIN: CPT | Mod: 24 | Performed by: SURGERY

## 2019-11-19 PROCEDURE — 80076 HEPATIC FUNCTION PANEL: CPT | Performed by: PATHOLOGY

## 2019-11-19 PROCEDURE — 83690 ASSAY OF LIPASE: CPT | Performed by: SURGERY

## 2019-11-19 ASSESSMENT — MIFFLIN-ST. JEOR: SCORE: 1048.7

## 2019-11-19 NOTE — PROGRESS NOTES
"Michela is a 69 year old female who is status post multiple ventral and right inguinal hernia repair with mesh.  with no chills and no fever.   Did have acute kidney injury but that resolved with increased IV fluids and stopping the Toradol.   But the real hold up getting out of the hospital was the ileus.  The bowels not working.  But on day 11 her bowel function returned and she has done well since then.      Yesterday did a lot of running around to get things done that she needed to do and feels wiped out and hurts in her abdomen. The right side not where her hernias are.   Has been having bowel movement.        Patient's  Pain is  Improving from her surgery  But has right upper quadrant pain that is going to her back on both sides.  Appetite is  improving.    Wound(s)  Is/are   clean dry and intact with no evidence of infection.    Questions were answered and discussion of no lifting more than 20 pounds for 6 weeks after surgery.  Use antibiotic ointment on wound at night.       For her right upper quadrant pain  Dear Frannie Carlson  I saw the patient today for her follow up and she is having right upper quadrant pain.  Seems to be after eating fatty foods.     10 point review of systems done and all others are negative other than above.   /75   Pulse 76   Ht 1.499 m (4' 11.02\")   Wt 60.8 kg (134 lb)   BMI 27.05 kg/m      Past Medical History:   Diagnosis Date     CAD (coronary artery disease) 09/26/2014    s/p 4 MARQUES 2014     COPD (chronic obstructive pulmonary disease) (H)      Family history of sudden cardiac death (SCD)      GERD (gastroesophageal reflux disease) 2/4/2010     H/O idiopathic thrombocytopenic purpura      History of blood transfusion 2005    x 2     History of Graves' disease 1978     History of myocardial infarction 2014     History of sepsis 2015     Hyperlipidemia LDL goal <130 9/23/2009     Hypothyroidism 9/23/2009     Major depression      PONV (postoperative nausea and " "vomiting)        Past Surgical History:   Procedure Laterality Date     APPENDECTOMY       C/SECTION, LOW TRANSVERSE      x 4     CORONARY ANGIOGRAPHY ADULT ORDER      Total of 5 coronary angiograms     HC DRUG-ELUTING STENTS, SINGLE       HYSTERECTOMY TOTAL ABD, JIMENA SALPINGO-OOPHORECTOMY, NODE DISSECTION, TUMOR DEBULKING, COMBINED N/A 2018    Procedure: COMBINED HYSTERECTOMY TOTAL ABDOMINAL, SALPINGO-OOPHORECTOMY, NODE DISSECTION, TUMOR DEBULKING;;  Surgeon: Melody Bolivar MD;  Location: [facility] OR     LAPAROSCOPIC HYSTERECTOMY TOTAL, JIMENA SALPINGO-OOPHORECTOMY, NODE DISSECTION, TUMOR STAGING, COMBINED N/A 2018    Procedure: COMBINED LAPAROSCOPIC HYSTERECTOMY TOTAL, SALPINGO-OOPHORECTOMY, NODE DISSECTION, TUMOR STAGING;  Diagnostic Laparoscopy, Laparotomy, Extensive Lysis of Adhesions, Open Bilateral Salpingo-Oophorectomy, Partial Omentectomy, Anesthesia Block;  Surgeon: eMlody Bolivar MD;  Location: [facility] OR     SPLENECTOMY  1978    ITP     TUBAL LIGATION      x2       Social History     Socioeconomic History     Marital status:      Spouse name: Not on file     Number of children: Not on file     Years of education: Not on file     Highest education level: Not on file   Occupational History     Not on file   Social Needs     Financial resource strain: Not on file     Food insecurity:     Worry: Not on file     Inability: Not on file     Transportation needs:     Medical: Not on file     Non-medical: Not on file   Tobacco Use     Smoking status: Former Smoker     Packs/day: 0.25     Years: 42.00     Pack years: 10.50     Types: Cigarettes     Last attempt to quit: 2013     Years since quittin.4     Smokeless tobacco: Never Used   Substance and Sexual Activity     Alcohol use: Yes     Alcohol/week: 6.0 standard drinks     Types: 6 Cans of beer per week     Comment: A couple times a week, \"three beers max\"     Drug use: No     Sexual activity: Yes     Partners: Male     Birth " control/protection: Post-menopausal, Female Surgical   Lifestyle     Physical activity:     Days per week: Not on file     Minutes per session: Not on file     Stress: Not on file   Relationships     Social connections:     Talks on phone: Not on file     Gets together: Not on file     Attends [employer] service: Not on file     Active member of club or organization: Not on file     Attends meetings of clubs or organizations: Not on file     Relationship status: Not on file     Intimate partner violence:     Fear of current or ex partner: Not on file     Emotionally abused: Not on file     Physically abused: Not on file     Forced sexual activity: Not on file   Other Topics Concern     Parent/sibling w/ CABG, MI or angioplasty before 65F 55M? Not Asked   Social History Narrative     Not on file       Current Outpatient Medications   Medication Sig Dispense Refill     albuterol (PROAIR HFA/PROVENTIL HFA/VENTOLIN HFA) 108 (90 Base) MCG/ACT inhaler Inhale 2 puffs into the lungs every 6 hours as needed for shortness of breath / dyspnea 2 Inhaler 2     aspirin EC 81 MG tablet Take 81 mg by mouth every morning        buPROPion (WELLBUTRIN SR) 200 MG 12 hr tablet Take 1 tablet (200 mg) by mouth 2 times daily 60 tablet 5     diclofenac (VOLTAREN) 1 % GEL topical gel Apply  2 grams to hands four times daily using enclosed dosing card. 100 g 0     isosorbide mononitrate (IMDUR) 30 MG 24 hr tablet Take 2 tablets (60 mg) by mouth daily 180 tablet 1     levothyroxine (SYNTHROID/LEVOTHROID) 50 MCG tablet Take 1 tablet (50 mcg) by mouth daily 90 tablet 3     LORazepam (ATIVAN) 0.5 MG tablet Take 0.5-1 tablets (0.25-0.5 mg) by mouth every 8 hours as needed for anxiety 15 tablet 0     metoprolol (TOPROL-XL) 50 MG 24 hr tablet Take 1 tablet (50 mg) by mouth daily 90 tablet 3     nitroglycerin (NITROSTAT) 0.4 MG sublingual tablet Place 1 tablet (0.4 mg) under the tongue every 5 minutes as needed for chest pain 25 tablet 11      pantoprazole (PROTONIX) 40 MG EC tablet Take 1 tablet (40 mg) by mouth daily 180 tablet 1     rosuvastatin (CRESTOR) 20 MG tablet Take 20 mg by mouth       sertraline (ZOLOFT) 25 MG tablet Take 1 tablet (25 mg) by mouth daily 30 tablet 5     umeclidinium-vilanterol (ANORO ELLIPTA) 62.5-25 MCG/INH oral inhaler Inhale 1 puff into the lungs daily 1 Inhaler 5     Physical exam:  Patient able to get up on table with mild difficulty.  Head eyes, nose and mouth within normal limits.    Abdomen is abdomen is soft without significant tenderness, masses, organomegaly or guarding  bowel sounds are positive and no caput medusa noted.  But has a positive leyva sign.    No obvious hernias noted.    CT ABDOMEN/PELVIS WITH CONTRAST September 3, 2019 10:04 AM      HISTORY: Looking for other hernias besides the supraumbilical and the  epigastric hernias that I feel. Ventral hernia without obstruction or  gangrene.     TECHNIQUE: 67 mL Isovue-370. CT images of the abdomen and pelvis  following nonionic intravenous contrast. Radiation dose for this scan  was reduced using automated exposure control, adjustment of the mA  and/or kV according to patient size, or iterative reconstruction  technique.     COMPARISON: None.     FINDINGS: Dependent high density in the gallbladder likely small  stones. The liver, pancreas, and adrenal glands are unremarkable.  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 Right kidney appears normal. There is a simple  appearing cyst and an otherwise unremarkable left kidney. There is  mild nonaneurysmal aortic atherosclerosis. No abdominal or  retroperitoneal lymphadenopathy. No abnormal bowel distention, free  air, or ascites. There is sigmoid diverticulosis without  diverticulitis. No pelvic adenopathy or free fluid. No lytic or  blastic bone lesions.     There are two, [id number] fat-containing ventral hernias above the  umbilicus (series 2, images 13-15). There is another smaller ventral  hernia above the umbilicus (series 2,  image 35) that contains fat. A  fourth fat-containing ventral hernia is present to the right of  midline at the level of the umbilicus (series 2, image 40). There is a  small fat-containing right inguinal hernia.                                                                      IMPRESSION:  1. Multiple abdominal wall hernias described above.  2. Cholelithiasis and diverticulosis.     LUZ CARTAGENA MD  Labs show:  Ct scan done 9 3/2019 shows + stones -dialted ducts -  thickening fo the gallbladder .  No ultrasound done so if any labs abnormal will get ultrasound of the gallbladder.    Patient denies any symptoms of common bile duct stone, or pancreatitis.  .     Assessment:  cholelithiasis with right upper quadrant pain  Patient just had extensive surgery so need to wait on any further surgery at this time.  Will not be the easiest laparoscopic cholecystectomy to do with her previous surgeries.   But will get labs today and see if we are being pushed.   Plan to do laparoscopic cholecystectomy possible open.  We discussed typical gallbladder symptoms and I drew out a diagram to help discuss how the gallbladder works and what are the symptoms of a gallbladder attack.  I also discussed gallbladder surgery with risks and complications of surgery including bleeding, hernias, damage to bowel , damage to the bile ducts, risks of anesthesia.  If I get into bleeding that I can not control laparoscopicly, if I get a hole in the bowel or if the anatomy of the bile ducts does not look normal I may have to convert to an open procedure.  Discussed what to expect afterwards, the use of the abdominal binder and no lifting greater than 20 pounds for 3 weeks after surgery. That if done laparoscopically will plan on her going home the same day, but if I have to convert to an open procedure will have patient admitted to the hospital.      Time spent with the patient with greater that 50% of the time in discussion was 40  minutes.    Da Saunders MD                  Procedure: OPEN HERNIA REPAIR VENTRAL MULTIPLE WITH MESH Case Time: 10/30/2019  9:49 AM Surgeon: Da Saunders MD                 []Hide copied text    []Hover for details     POST-OPERATIVE NOTE       Surgeon(s):  Da Saunders MD     Assistant(s):  Navi nurse: Bettina Parker RN; Umu Blackman RN  Scrub tech: Lina Blackman; Swapna Guerrero     Preoperative Diagnosis:  Multiple painful ventral and right inguinal hernia   2 in the medial splenic incision incisional hernia  about 3 cm apart and 1 at the umbilicus and another just superior to the umbilicus with about 3 cm apart both incisional hernias and a  right inguinal hernia that had preperitoneal fat along the round ligament.        Postoperative Diagnosis:  Multiple painful ventral and right inguinal hernia  All the ventral hernias (4) were incisonal hernias.   2 in the medial splenic incison about 3 cm apart and 1 at the umbilicus and another just superior to the umbilicus with about 3 cm apart and a  right inguinal hernia that had preperitoneal fat along the round ligament.  Procedure(s):    Splenic incision opened up to the 2 sites as tissue was poor and closed with 8 cm ventralex mesh  Periumbilical sites were opened into one site and repaired  With 8 cm ventralex mesh   right inguinal hernia repaired with progrip.      Complications:  None      EBL: 15 mL     Anesthesia:  General     Operative Findings:  As above     Specimen(s):  * No specimens in log *hernia sac and right groin fat not sent.      Grafts and/or Implants:  Implant Name Type Inv. Item Serial No.  Lot No. LRB No. Used   PATCH VENTRALEX ST L - [id number] Mesh/patch PATCH VENTRALEX ST L   Bard [id number] N/A 1   PATCH VENTRALEX ST L - [id number] Mesh/patch PATCH VENTRALEX ST L   Bard [id number] N/A 1   MESH PROGRIP 12CM X 8CM RIGHT - [id number] Mesh/patch MESH PROGRIP 12CM X 8CM RIGHT   [facility]  [id number] Right 1         Condition on Discharge from Operating Room: Satisfactory        REPORT OF OPERATION/ PROCEDURE  Please see below operative note.                 OPERATIVE REPORT     10/30/19  Preoperative diagnosis:2 in the medial splenic incision incisional hernia  about 3 cm apart and 1 at the umbilicus and another just superior to the umbilicus with about 3 cm apart both incisional hernias and a  right inguinal hernia that had preperitoneal fat along the round ligament.     Postoperative diagnosis:  All the ventral hernias (4) were incisonal hernias.   2 in the medial splenic incison about 3 cm apart and 1 at the umbilicus and another just superior to the umbilicus with about 3 cm apart and a  right inguinal hernia that had preperitoneal fat along the round ligament.  Procedure(s):    Splenic incision opened up to the 2 sites as tissue was poor and closed with 8 cm ventralex mesh  Periumbilical sites were opened into one site and repaired  With 8 cm ventralex mesh   right inguinal hernia repaired with progrip.  Procedure:Splenic incision opened up to the 2 sites as tissue was poor and closed with 8 cm ventralex mesh  Periumbilical sites were opened into one site and repaired  With 8 cm ventralex mesh   right inguinal hernia repaired with progrip.    Anesthesia:  General anesthesia  Blood loss: 15 cc  Surgeon : Da Saunders M.D.  Indications:  Michela Cole is a 69 y.o. year old female with  2 in the medial splenic incision incisional hernia  about 3 cm apart and 1 at the umbilicus and another just superior to the umbilicus with about 3 cm apart both incisional hernias and a  right inguinal hernia that had preperitoneal fat along the round ligament.   that are  causing  discomfort.  It was felt that it should be repaired.  Risks and complications were explained to the patient including bleeding, risk of anesthesia, infection, recurrence of hernia, damage to bowel, and bladder.  That mesh is a  better long term repair, but the down side is that if it gets infected will need to be removed.  Questions were answered and postoperative instructions were given to patient and any one with the patient.    Patient has  Had multoiple adhesion on previous surgeries so felt that open may be best for her.      Procedure:  DESCRIPTION OF PROCEDURE:  The patient was brought to the OR, placed in supine position.  After receiving general anesthesia, the abdomen was prepped and draped in sterile manner.  The splenic hernia were done first. An  incision was made with a knife and then cautery and blunt and sharp dissection down to the hernia sac. I was able to find both hernias and felt better to open the space between them since the tissue was poor.    The hernia sac was freed up from surrounding attachments .    It was then after opening up the sac and making sure that there was nothing in the sac, the sac was removed with cautery along the fascia edge and a finger was placed inside the abdomen and the fat was freed up as best as possible so they could lay the mesh there nicely.  I did place some gauze just inside the abdomen but not all the way in several times to make sure there was no more bleeding then and we also irrigated the area with some antibiotic irrigation.  There was no evidence of anymore bleeding when we were done with that part.  Then I measured the area and a 8 cm ventralex mesh seemed like it could be laid the best in that area and so that was used, was then secured at both  sites with 0 Vicryl suture, and then the straps were cut and removed.  Then it was closed  transversely with catching a little bit of the mesh with a stitch on both sides and in the middle, taking great care not to go very deep into the mesh, but just to catch a little bit to pull it up with multiple 0 PDS.  Then multiple O PDS sutures were used to close the fascia and these were tied down and after the excess suture was removed the  surrounding fat was brought together in a pursestring suture with 0 Vicryl suture to cover over my knots.  Then the skin was brought together with several interrupted 3-0 Vicryl sutures then closed with continuous running 4-0 Monocryl, covered with tincture of Benzoin, Steri-Strips, and Tegaderm.  All areas were irrigated with antibiotic irrigation before going to the next layer.  Good hemostasis was achieved throughout the whole procedure.  All layers were infiltrated with Marcaine before going to the next layer.  The patient tolerated the procedure very well.   The count was correct.      Attention was then brought to the 2 periumbilical hernias               DESCRIPTION OF PROCEDURE:  incision was made with a knife, superior to the umbilicus and then to the right side of the umbiliucs to find the 2 hernias.  The were about 2-3 cm apart so I divided the tissue between them to make sure we have a good closure.  The hernia sac was freed up from surrounding attachments and to the skin at the umbilicus,         The hernia sac was freed up from surrounding attachments .    It was then after opening up the sac and making sure that there was nothing in the sac, the sac was removed with cautery along the fascia edge and a finger was placed inside the abdomen and the fat was freed up as best as possible so they could lay the mesh there nicely.  I did place some gauze just inside the abdomen but not all the way in several times to make sure there was no more bleeding then and we also irrigated the area with some antibiotic irrigation.  There was no evidence of anymore bleeding when we were done with that part.  Then I measured the area and a 8 cm ventralex mesh seemed like it could be laid the best in that area and so that was used, was then secured at both  sites with 0 Vicryl suture, and then the straps were cut and removed.  Then it was closed  transversely with catching a little bit of the mesh with a stitch on both  sides and in the middle, taking great care not to go very deep into the mesh, but just to catch a little bit to pull it up with multiple 0 PDS.  Then multiple O PDS sutures were used to close the fascia and these were tied down and after the excess suture was removed the surrounding fat was brought together in a pursestring suture with 0 Vicryl suture to cover over my knots.  Then the skin was brought together with several interrupted 3-0 Vicryl sutures then closed with continuous running 4-0 Monocryl, covered with tincture of Benzoin, Steri-Strips, and Tegaderm.  All areas were irrigated with antibiotic irrigation before going to the next layer.  Good hemostasis was achieved throughout the whole procedure.  All layers were infiltrated with Marcaine before going to the next layer.  The patient tolerated the procedure very well.   The count was correct.      Attention was then brought to the right groin     10/30/19  Preoperative diagnosis: right  Inguinal hernia.  Postoperative diagnosis:  same right inguinal hernia that had preperitoneal fat along the round ligament.  Procedure:   Inguinal hernia repair with  ProGrip 12 by 8 cm  Anesthesia:  General anesthesia  Blood loss: Minimal  Surgeon : Da Saunders M.D.  Indications:  Michela Cole is a 69 y.o. year old female with a  inguinal hernia that is causing  discomfort.  It was felt that it should be repaired.  Risks and complications were explained to the patient including bleeding, risk of anenesthesia, infection, damage to nerves,  recurrance of hernia, damage to bowel, bladder..  That mesh is a better long term repair, but the down side is that if it gets infected will need to be removed.  Questions were answered and postoperative instructions were given to patient and any one with the patient.       Procedure:  An Incision was made in the usual fashion with a knife and then blunt and sharp and cautery was used to dissect down to the external oblique.   The external oblique was opened in the direction of its fibers at the external canal with cautery and then the rest was mainly blunt dissection.  fat could be seen coming up with the round ligament.  This was divided with cautery along with the fat. The rest of the fat was placed back into the abdome. The spermatic cord was carefully freed of surrounding attachments and placed in a Penrose drain. so it was more of an  indirect hernia,  , with  the defect involving the floor, so I felt that once we had that completely freed up and placed back inside the abdomen, I did close the defect, catching the internal oblique muscle and fascia with a continuous running 0 Vicryl suture, and catching as far down on the lateral aspect of the external oblique fascia as possible to leave some room to close over the mesh.        The nerve was divided during the dissection.        After that was done, The  Right  12x8 cm ProGrip mesh was placed.  It was secured at the pubic tubercle with an 0-Vicryl suture at the marking on the mesh and then was laid on the floor, recreating the internal canal   It laid there very nicely.  It looks like it is going to cover the area well.  After that was done and laid nicely, the area was irrigated with antibiotic irrigation with good clean return.  No evidence of any bleeding.  The nerve which had been kept out of the way and seemed to be divided , was placed on   top of the mesh also.  Then, the external oblique fascia was closed with a continuous running 0 Vicryl suture, taking great care not to involve the nerve and closing over the spermatic cord.  The Hector's fascia was brought together with several interrupted 3-0 Vicryl sutures, and skin was closed with continuous running 4-0 Monocryl, covered with tincture of Benzoin, Steri-Strips and a Tegaderm, along with an abdominal binder.  The patient did receive antibiotics preoperatively.       Plan is to discharge him home today.  Lift no more than  20 pounds for 6 weeks.  I will see him back in approximately 2 weeks.           NEEL JON MD

## 2019-11-19 NOTE — LETTER
"    11/19/2019         RE: Michela Cole  [address] [facility] [id number]        Dear Colleague,    Thank you for referring your patient, Michela Cole, to the [facility]. Please see a copy of my visit note below.    Michela is a 69 year old female who is status post multiple ventral and right inguinal hernia repair with mesh.  with no chills and no fever.   Did have acute kidney injury but that resolved with increased IV fluids and stopping the Toradol.   But the real hold up getting out of the hospital was the ileus.  The bowels not working.  But on day 11 her bowel function returned and she has done well since then.      Yesterday did a lot of running around to get things done that she needed to do and feels wiped out and hurts in her abdomen. The right side not where her hernias are.   Has been having bowel movement.        Patient's  Pain is  Improving from her surgery  But has right upper quadrant pain that is going to her back on both sides.  Appetite is  improving.    Wound(s)  Is/are   clean dry and intact with no evidence of infection.    Questions were answered and discussion of no lifting more than 20 pounds for 6 weeks after surgery.  Use antibiotic ointment on wound at night.       For her right upper quadrant pain  Dear Frannie Carlson  I saw the patient today for her follow up and she is having right upper quadrant pain.  Seems to be after eating fatty foods.     10 point review of systems done and all others are negative other than above.   /75   Pulse 76   Ht 1.499 m (4' 11.02\")   Wt 60.8 kg (134 lb)   BMI 27.05 kg/m       Past Medical History:   Diagnosis Date     CAD (coronary artery disease) 09/26/2014    s/p 4 DES 2014     COPD (chronic obstructive pulmonary disease) (H)      Family history of sudden cardiac death (SCD)      GERD (gastroesophageal reflux disease) 2/4/2010     H/O idiopathic thrombocytopenic purpura      History of blood " transfusion 2005    x 2     History of Graves' disease      History of myocardial infarction 2014     History of sepsis 2015     Hyperlipidemia LDL goal <130 2009     Hypothyroidism 2009     Major depression      PONV (postoperative nausea and vomiting)        Past Surgical History:   Procedure Laterality Date     APPENDECTOMY       C/SECTION, LOW TRANSVERSE      x 4     CORONARY ANGIOGRAPHY ADULT ORDER      Total of 5 coronary angiograms     HC DRUG-ELUTING STENTS, SINGLE       HYSTERECTOMY TOTAL ABD, JIMENA SALPINGO-OOPHORECTOMY, NODE DISSECTION, TUMOR DEBULKING, COMBINED N/A 2018    Procedure: COMBINED HYSTERECTOMY TOTAL ABDOMINAL, SALPINGO-OOPHORECTOMY, NODE DISSECTION, TUMOR DEBULKING;;  Surgeon: Melody Bolivar MD;  Location: [facility] OR     LAPAROSCOPIC HYSTERECTOMY TOTAL, JIMENA SALPINGO-OOPHORECTOMY, NODE DISSECTION, TUMOR STAGING, COMBINED N/A 2018    Procedure: COMBINED LAPAROSCOPIC HYSTERECTOMY TOTAL, SALPINGO-OOPHORECTOMY, NODE DISSECTION, TUMOR STAGING;  Diagnostic Laparoscopy, Laparotomy, Extensive Lysis of Adhesions, Open Bilateral Salpingo-Oophorectomy, Partial Omentectomy, Anesthesia Block;  Surgeon: Melody Bolivar MD;  Location: [facility] OR     SPLENECTOMY      ITP     TUBAL LIGATION      x2       Social History     Socioeconomic History     Marital status:      Spouse name: Not on file     Number of children: Not on file     Years of education: Not on file     Highest education level: Not on file   Occupational History     Not on file   Social Needs     Financial resource strain: Not on file     Food insecurity:     Worry: Not on file     Inability: Not on file     Transportation needs:     Medical: Not on file     Non-medical: Not on file   Tobacco Use     Smoking status: Former Smoker     Packs/day: 0.25     Years: 42.00     Pack years: 10.50     Types: Cigarettes     Last attempt to quit: 2013     Years since quittin.4     Smokeless tobacco: Never Used  "  Substance and Sexual Activity     Alcohol use: Yes     Alcohol/week: 6.0 standard drinks     Types: 6 Cans of beer per week     Comment: A couple times a week, \"three beers max\"     Drug use: No     Sexual activity: Yes     Partners: Male     Birth control/protection: Post-menopausal, Female Surgical   Lifestyle     Physical activity:     Days per week: Not on file     Minutes per session: Not on file     Stress: Not on file   Relationships     Social connections:     Talks on phone: Not on file     Gets together: Not on file     Attends [employer] service: Not on file     Active member of club or organization: Not on file     Attends meetings of clubs or organizations: Not on file     Relationship status: Not on file     Intimate partner violence:     Fear of current or ex partner: Not on file     Emotionally abused: Not on file     Physically abused: Not on file     Forced sexual activity: Not on file   Other Topics Concern     Parent/sibling w/ CABG, MI or angioplasty before 65F 55M? Not Asked   Social History Narrative     Not on file       Current Outpatient Medications   Medication Sig Dispense Refill     albuterol (PROAIR HFA/PROVENTIL HFA/VENTOLIN HFA) 108 (90 Base) MCG/ACT inhaler Inhale 2 puffs into the lungs every 6 hours as needed for shortness of breath / dyspnea 2 Inhaler 2     aspirin EC 81 MG tablet Take 81 mg by mouth every morning        buPROPion (WELLBUTRIN SR) 200 MG 12 hr tablet Take 1 tablet (200 mg) by mouth 2 times daily 60 tablet 5     diclofenac (VOLTAREN) 1 % GEL topical gel Apply  2 grams to hands four times daily using enclosed dosing card. 100 g 0     isosorbide mononitrate (IMDUR) 30 MG 24 hr tablet Take 2 tablets (60 mg) by mouth daily 180 tablet 1     levothyroxine (SYNTHROID/LEVOTHROID) 50 MCG tablet Take 1 tablet (50 mcg) by mouth daily 90 tablet 3     LORazepam (ATIVAN) 0.5 MG tablet Take 0.5-1 tablets (0.25-0.5 mg) by mouth every 8 hours as needed for anxiety 15 tablet 0     " metoprolol (TOPROL-XL) 50 MG 24 hr tablet Take 1 tablet (50 mg) by mouth daily 90 tablet 3     nitroglycerin (NITROSTAT) 0.4 MG sublingual tablet Place 1 tablet (0.4 mg) under the tongue every 5 minutes as needed for chest pain 25 tablet 11     pantoprazole (PROTONIX) 40 MG EC tablet Take 1 tablet (40 mg) by mouth daily 180 tablet 1     rosuvastatin (CRESTOR) 20 MG tablet Take 20 mg by mouth       sertraline (ZOLOFT) 25 MG tablet Take 1 tablet (25 mg) by mouth daily 30 tablet 5     umeclidinium-vilanterol (ANORO ELLIPTA) 62.5-25 MCG/INH oral inhaler Inhale 1 puff into the lungs daily 1 Inhaler 5     Physical exam:  Patient able to get up on table with mild difficulty.  Head eyes, nose and mouth within normal limits.    Abdomen is abdomen is soft without significant tenderness, masses, organomegaly or guarding  bowel sounds are positive and no caput medusa noted.  But has a positive leyva sign.    No obvious hernias noted.    CT ABDOMEN/PELVIS WITH CONTRAST September 3, 2019 10:04 AM      HISTORY: Looking for other hernias besides the supraumbilical and the  epigastric hernias that I feel. Ventral hernia without obstruction or  gangrene.     TECHNIQUE: 67 mL Isovue-370. CT images of the abdomen and pelvis  following nonionic intravenous contrast. Radiation dose for this scan  was reduced using automated exposure control, adjustment of the mA  and/or kV according to patient size, or iterative reconstruction  technique.     COMPARISON: None.     FINDINGS: Dependent high density in the gallbladder likely small  stones. The liver, pancreas, and adrenal glands are unremarkable.  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 Right kidney appears normal. There is a simple  appearing cyst and an otherwise unremarkable left kidney. There is  mild nonaneurysmal aortic atherosclerosis. No abdominal or  retroperitoneal lymphadenopathy. No abnormal bowel distention, free  air, or ascites. There is sigmoid diverticulosis without  diverticulitis. No pelvic  adenopathy or free fluid. No lytic or  blastic bone lesions.     There are two, [id number] fat-containing ventral hernias above the  umbilicus (series 2, images 13-15). There is another smaller ventral  hernia above the umbilicus (series 2, image 35) that contains fat. A  fourth fat-containing ventral hernia is present to the right of  midline at the level of the umbilicus (series 2, image 40). There is a  small fat-containing right inguinal hernia.                                                                      IMPRESSION:  1. Multiple abdominal wall hernias described above.  2. Cholelithiasis and diverticulosis.     LUZ CARTAGENA MD  Labs show:  Ct scan done 9 3/2019 shows + stones -dialted ducts -  thickening fo the gallbladder .  No ultrasound done so if any labs abnormal will get ultrasound of the gallbladder.    Patient denies any symptoms of common bile duct stone, or pancreatitis.  .     Assessment:  cholelithiasis with right upper quadrant pain  Patient just had extensive surgery so need to wait on any further surgery at this time.  Will not be the easiest laparoscopic cholecystectomy to do with her previous surgeries.   But will get labs today and see if we are being pushed.   Plan to do laparoscopic cholecystectomy possible open.  We discussed typical gallbladder symptoms and I drew out a diagram to help discuss how the gallbladder works and what are the symptoms of a gallbladder attack.  I also discussed gallbladder surgery with risks and complications of surgery including bleeding, hernias, damage to bowel , damage to the bile ducts, risks of anesthesia.  If I get into bleeding that I can not control laparoscopicly, if I get a hole in the bowel or if the anatomy of the bile ducts does not look normal I may have to convert to an open procedure.  Discussed what to expect afterwards, the use of the abdominal binder and no lifting greater than 20 pounds for 3 weeks after surgery. That if done  laparoscopically will plan on her going home the same day, but if I have to convert to an open procedure will have patient admitted to the hospital.      Time spent with the patient with greater that 50% of the time in discussion was 40 minutes.    Da Saunders MD                  Procedure: OPEN HERNIA REPAIR VENTRAL MULTIPLE WITH MESH Case Time: 10/30/2019  9:49 AM Surgeon: Da Saunders MD                 []Hide copied text    []Hover for details     POST-OPERATIVE NOTE       Surgeon(s):  Da Saunders MD     Assistant(s):  Navi nurse: Bettina Parker RN; Umu Blackman RN  Scrub tech: Lina Blackman; Swapna Guerrero     Preoperative Diagnosis:  Multiple painful ventral and right inguinal hernia   2 in the medial splenic incision incisional hernia  about 3 cm apart and 1 at the umbilicus and another just superior to the umbilicus with about 3 cm apart both incisional hernias and a  right inguinal hernia that had preperitoneal fat along the round ligament.        Postoperative Diagnosis:  Multiple painful ventral and right inguinal hernia  All the ventral hernias (4) were incisonal hernias.   2 in the medial splenic incison about 3 cm apart and 1 at the umbilicus and another just superior to the umbilicus with about 3 cm apart and a  right inguinal hernia that had preperitoneal fat along the round ligament.  Procedure(s):    Splenic incision opened up to the 2 sites as tissue was poor and closed with 8 cm ventralex mesh  Periumbilical sites were opened into one site and repaired  With 8 cm ventralex mesh   right inguinal hernia repaired with progrip.      Complications:  None      EBL: 15 mL     Anesthesia:  General     Operative Findings:  As above     Specimen(s):  * No specimens in log *hernia sac and right groin fat not sent.      Grafts and/or Implants:  Implant Name Type Inv. Item Serial No.  Lot No. LRB No. Used   PATCH VENTRALEX ST L - [id number]  Mesh/patch PATCH VENTRALEX ST L   Bard [id number] N/A 1   PATCH VENTRALEX ST L - [id number] Mesh/patch PATCH VENTRALEX ST L   Bard [id number] N/A 1   MESH PROGRIP 12CM X 8CM RIGHT - [id number] Mesh/patch MESH PROGRIP 12CM X 8CM RIGHT   [employer] [id number] Right 1         Condition on Discharge from Operating Room: Satisfactory        REPORT OF OPERATION/ PROCEDURE  Please see below operative note.                 OPERATIVE REPORT     10/30/19  Preoperative diagnosis:2 in the medial splenic incision incisional hernia  about 3 cm apart and 1 at the umbilicus and another just superior to the umbilicus with about 3 cm apart both incisional hernias and a  right inguinal hernia that had preperitoneal fat along the round ligament.     Postoperative diagnosis:  All the ventral hernias (4) were incisonal hernias.   2 in the medial splenic incison about 3 cm apart and 1 at the umbilicus and another just superior to the umbilicus with about 3 cm apart and a  right inguinal hernia that had preperitoneal fat along the round ligament.  Procedure(s):    Splenic incision opened up to the 2 sites as tissue was poor and closed with 8 cm ventralex mesh  Periumbilical sites were opened into one site and repaired  With 8 cm ventralex mesh   right inguinal hernia repaired with progrip.  Procedure:Splenic incision opened up to the 2 sites as tissue was poor and closed with 8 cm ventralex mesh  Periumbilical sites were opened into one site and repaired  With 8 cm ventralex mesh   right inguinal hernia repaired with progrip.    Anesthesia:  General anesthesia  Blood loss: 15 cc  Surgeon : Da Saunders M.D.  Indications:  Michela Cole is a 69 y.o. year old female with  2 in the medial splenic incision incisional hernia  about 3 cm apart and 1 at the umbilicus and another just superior to the umbilicus with about 3 cm apart both incisional hernias and a  right inguinal hernia that had preperitoneal fat along the round ligament.   that are   causing  discomfort.  It was felt that it should be repaired.  Risks and complications were explained to the patient including bleeding, risk of anesthesia, infection, recurrence of hernia, damage to bowel, and bladder.  That mesh is a better long term repair, but the down side is that if it gets infected will need to be removed.  Questions were answered and postoperative instructions were given to patient and any one with the patient.    Patient has  Had multoiple adhesion on previous surgeries so felt that open may be best for her.      Procedure:  DESCRIPTION OF PROCEDURE:  The patient was brought to the OR, placed in supine position.  After receiving general anesthesia, the abdomen was prepped and draped in sterile manner.  The splenic hernia were done first. An  incision was made with a knife and then cautery and blunt and sharp dissection down to the hernia sac. I was able to find both hernias and felt better to open the space between them since the tissue was poor.    The hernia sac was freed up from surrounding attachments .    It was then after opening up the sac and making sure that there was nothing in the sac, the sac was removed with cautery along the fascia edge and a finger was placed inside the abdomen and the fat was freed up as best as possible so they could lay the mesh there nicely.  I did place some gauze just inside the abdomen but not all the way in several times to make sure there was no more bleeding then and we also irrigated the area with some antibiotic irrigation.  There was no evidence of anymore bleeding when we were done with that part.  Then I measured the area and a 8 cm ventralex mesh seemed like it could be laid the best in that area and so that was used, was then secured at both  sites with 0 Vicryl suture, and then the straps were cut and removed.  Then it was closed  transversely with catching a little bit of the mesh with a stitch on both sides and in the middle, taking great  care not to go very deep into the mesh, but just to catch a little bit to pull it up with multiple 0 PDS.  Then multiple O PDS sutures were used to close the fascia and these were tied down and after the excess suture was removed the surrounding fat was brought together in a pursestring suture with 0 Vicryl suture to cover over my knots.  Then the skin was brought together with several interrupted 3-0 Vicryl sutures then closed with continuous running 4-0 Monocryl, covered with tincture of Benzoin, Steri-Strips, and Tegaderm.  All areas were irrigated with antibiotic irrigation before going to the next layer.  Good hemostasis was achieved throughout the whole procedure.  All layers were infiltrated with Marcaine before going to the next layer.  The patient tolerated the procedure very well.   The count was correct.      Attention was then brought to the 2 periumbilical hernias               DESCRIPTION OF PROCEDURE:  incision was made with a knife, superior to the umbilicus and then to the right side of the umbiliucs to find the 2 hernias.  The were about 2-3 cm apart so I divided the tissue between them to make sure we have a good closure.  The hernia sac was freed up from surrounding attachments and to the skin at the umbilicus,         The hernia sac was freed up from surrounding attachments .    It was then after opening up the sac and making sure that there was nothing in the sac, the sac was removed with cautery along the fascia edge and a finger was placed inside the abdomen and the fat was freed up as best as possible so they could lay the mesh there nicely.  I did place some gauze just inside the abdomen but not all the way in several times to make sure there was no more bleeding then and we also irrigated the area with some antibiotic irrigation.  There was no evidence of anymore bleeding when we were done with that part.  Then I measured the area and a 8 cm ventralex mesh seemed like it could be laid the  best in that area and so that was used, was then secured at both  sites with 0 Vicryl suture, and then the straps were cut and removed.  Then it was closed  transversely with catching a little bit of the mesh with a stitch on both sides and in the middle, taking great care not to go very deep into the mesh, but just to catch a little bit to pull it up with multiple 0 PDS.  Then multiple O PDS sutures were used to close the fascia and these were tied down and after the excess suture was removed the surrounding fat was brought together in a pursestring suture with 0 Vicryl suture to cover over my knots.  Then the skin was brought together with several interrupted 3-0 Vicryl sutures then closed with continuous running 4-0 Monocryl, covered with tincture of Benzoin, Steri-Strips, and Tegaderm.  All areas were irrigated with antibiotic irrigation before going to the next layer.  Good hemostasis was achieved throughout the whole procedure.  All layers were infiltrated with Marcaine before going to the next layer.  The patient tolerated the procedure very well.   The count was correct.      Attention was then brought to the right groin     10/30/19  Preoperative diagnosis: right  Inguinal hernia.  Postoperative diagnosis:  same right inguinal hernia that had preperitoneal fat along the round ligament.  Procedure:   Inguinal hernia repair with  ProGrip 12 by 8 cm  Anesthesia:  General anesthesia  Blood loss: Minimal  Surgeon : Da Saunders M.D.  Indications:  Michela Cole is a 69 y.o. year old female with a  inguinal hernia that is causing  discomfort.  It was felt that it should be repaired.  Risks and complications were explained to the patient including bleeding, risk of anenesthesia, infection, damage to nerves,  recurrance of hernia, damage to bowel, bladder..  That mesh is a better long term repair, but the down side is that if it gets infected will need to be removed.  Questions were answered and  postoperative instructions were given to patient and any one with the patient.       Procedure:  An Incision was made in the usual fashion with a knife and then blunt and sharp and cautery was used to dissect down to the external oblique.  The external oblique was opened in the direction of its fibers at the external canal with cautery and then the rest was mainly blunt dissection.  fat could be seen coming up with the round ligament.  This was divided with cautery along with the fat. The rest of the fat was placed back into the abdome. The spermatic cord was carefully freed of surrounding attachments and placed in a Penrose drain. so it was more of an  indirect hernia,  , with  the defect involving the floor, so I felt that once we had that completely freed up and placed back inside the abdomen, I did close the defect, catching the internal oblique muscle and fascia with a continuous running 0 Vicryl suture, and catching as far down on the lateral aspect of the external oblique fascia as possible to leave some room to close over the mesh.        The nerve was divided during the dissection.        After that was done, The  Right  12x8 cm ProGrip mesh was placed.  It was secured at the pubic tubercle with an 0-Vicryl suture at the marking on the mesh and then was laid on the floor, recreating the internal canal   It laid there very nicely.  It looks like it is going to cover the area well.  After that was done and laid nicely, the area was irrigated with antibiotic irrigation with good clean return.  No evidence of any bleeding.  The nerve which had been kept out of the way and seemed to be divided , was placed on   top of the mesh also.  Then, the external oblique fascia was closed with a continuous running 0 Vicryl suture, taking great care not to involve the nerve and closing over the spermatic cord.  The Hector's fascia was brought together with several interrupted 3-0 Vicryl sutures, and skin was closed with  continuous running 4-0 Monocryl, covered with tincture of Benzoin, Steri-Strips and a Tegaderm, along with an abdominal binder.  The patient did receive antibiotics preoperatively.       Plan is to discharge him home today.  Lift no more than 20 pounds for 6 weeks.  I will see him back in approximately 2 weeks.           DA SAUNDERS MD                         Again, thank you for allowing me to participate in the care of your patient.        Sincerely,        Da Saunders MD

## 2019-11-19 NOTE — LETTER
[facility]           [address] [place] [address] [place] [address]           Tel [phone]  Michela MERCADO Paci[place]k  [address]  [place] [address]      November 20, 2019    Dear Michela,  This letter is to inform you of the results of your liver function test, lipase and your blood count.   If you have questions please feel free to call my assistant  At [phone] .    Your report was:      Ref Range & Units 1d ago  (11/19/19) 2yr ago  (8/29/17) 2yr ago  (4/3/17)   Bilirubin Direct 0.0 - 0.2 mg/dL <0.1      Bilirubin Total 0.2 - 1.3 mg/dL 0.2  1.6High      Albumin 3.4 - 5.0 g/dL 3.6  4.0     Protein Total 6.8 - 8.8 g/dL 7.8  7.8     Alkaline Phosphatase 40 - 150 U/L 95  81     ALT 0 - 50 U/L 32  25  27    AST 0 - 45 U/L 28  20             Status:  Final result   Visible to patient:  No (Not Released) Dx:  RUQ abdominal pain    Ref Range & Units 1d ago 1mo ago 1yr ago   WBC 4.0 - 11.0 10e9/L 5.5      RBC Count 3.8 - 5.2 10e12/L 3.67Low       Hemoglobin 11.7 - 15.7 g/dL 11.4Low   12.6     Hematocrit 35.0 - 47.0 % 36.0      MCV 78 - 100 fl 98      MCH 26.5 - 33.0 pg 31.1      MCHC 31.5 - 36.5 g/dL 31.7      RDW 10.0 - 15.0 % 14.5      Platelet Count 150 - 450 10e9/L 657High    344    % Neutrophils % 54.0      % Lymphocytes % 27.1      % Monocytes % 12.8      % Eosinophils % 5.7      % Basophils % 0.4      Absolute Neutrophil 1.6 - 8.3 10e9/L 3.0      Absolute Lymphocytes 0.8 - 5.3 10e9/L 1.5      Absolute Monocytes 0.0 - 1.3 10e9/L 0.7      Absolute Eosinophils 0.0 - 0.7 10e9/L 0.3      Absolute Basophils 0.0 - 0.2 10e9/L 0.0      Diff Method               And your lipase was normal.  So all normal labs. So no rush to do any surgery on you now.  Lets see how you do.  But if your still have the right upper quadrant pain you may need your gallbladder out.   Try to avoid fatty foods as this as you remember sets off the gallbladder.       If you do have further questions please  don t hesitate to call my assistant at  .  We do not have someone answering the phone all the time at my assistants number so if leave a message may take a day or so to get back to you.  So if more urgent then call the below number.    To make an appointment call [phone]: .   Sincerely,     Da Saunders M.D.  ___

## 2019-11-19 NOTE — PATIENT INSTRUCTIONS
Michela is a 69 year old female who is status post multiple ventral and right inguinal hernia repair with mesh.  with no chills and no fever.   Did have acute kidney injury but that resolved with increased IV fluids and stopping the Toradol.   But the real hold up getting out of the hospital was the ileus.  The bowels not working.  But on day 11 her bowel function returned and she has done well since then.      Yesterday did a lot of running around to get things done that she needed to do and feels wiped out and hurts in her abdomen. The right side not where her hernias are.   Has been having bowel movement.        Patient's  Pain is  Improving from her surgery  But has right upper quadrant pain that is going to her back on both sides.  Appetite is  improving.    Wound(s)  Is/are   clean dry and intact with no evidence of infection.    Questions were answered and discussion of no lifting more than 20 pounds for 6 weeks after surgery.  Use antibiotic ointment on wound at night.       For her right upper quadrant pain  Dear Frannie Carlson  I saw the patient today for her follow up and she is having right upper quadrant pain.  Seems to be after eating fatty foods.     Abdomen is abdomen is soft without significant tenderness, masses, organomegaly or guarding  bowel sounds are positive and no caput medusa noted.  But has a positive leyva sign.    No obvious hernias noted.    CT ABDOMEN/PELVIS WITH CONTRAST September 3, 2019 10:04 AM      HISTORY: Looking for other hernias besides the supraumbilical and the  epigastric hernias that I feel. Ventral hernia without obstruction or  gangrene.     TECHNIQUE: 67 mL [id number]. CT images of the abdomen and pelvis  following nonionic intravenous contrast. Radiation dose for this scan  was reduced using automated exposure control, adjustment of the mA  and/or kV according to patient size, or iterative reconstruction  technique.     COMPARISON: None.     FINDINGS: Dependent  high density in the gallbladder likely small  stones. The liver, pancreas, and adrenal glands are unremarkable.  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 Right kidney appears normal. There is a simple  appearing cyst and an otherwise unremarkable left kidney. There is  mild nonaneurysmal aortic atherosclerosis. No abdominal or  retroperitoneal lymphadenopathy. No abnormal bowel distention, free  air, or ascites. There is sigmoid diverticulosis without  diverticulitis. No pelvic adenopathy or free fluid. No lytic or  blastic bone lesions.     There are two, [id number] fat-containing ventral hernias above the  umbilicus (series 2, images 13-15). There is another smaller ventral  hernia above the umbilicus (series 2, image 35) that contains fat. A  fourth fat-containing ventral hernia is present to the right of  midline at the level of the umbilicus (series 2, image 40). There is a  small fat-containing right inguinal hernia.                                                                      IMPRESSION:  1. Multiple abdominal wall hernias described above.  2. Cholelithiasis and diverticulosis.     LUZ CARTAGENA MD  Labs show:  Ct scan done 9 3/2019 shows + stones -dialted ducts -  thickening fo the gallbladder .  No ultrasound    Patient denies any symptoms of common bile duct stone, or pancreatitis.  .     Assessment:  cholelithiasis with right upper quadrant pain  Patient just had extensive surgery so need to wait on any further surgery at this time.  Will not be the easiest laparoscopic cholecystectomy to do with her previous surgeries.   But will get labs today and see if we are being pushed.    No ultrasound done so if any labs abnormal will get ultrasound of the gallbladder.   Plan to do laparoscopic cholecystectomy possible open.  We discussed typical gallbladder symptoms and I drew out a diagram to help discuss how the gallbladder works and what are the symptoms of a gallbladder attack.  I also discussed gallbladder surgery  with risks and complications of surgery including bleeding, hernias, damage to bowel , damage to the bile ducts, risks of anesthesia.  If I get into bleeding that I can not control laparoscopicly, if I get a hole in the bowel or if the anatomy of the bile ducts does not look normal I may have to convert to an open procedure.  Discussed what to expect afterwards, the use of the abdominal binder and no lifting greater than 20 pounds for 3 weeks after surgery. That if done laparoscopically will plan on her going home the same day, but if I have to convert to an open procedure will have patient admitted to the hospital.

## 2020-04-24 ENCOUNTER — ANCILLARY PROCEDURE (OUTPATIENT)
Dept: GENERAL RADIOLOGY | Facility: CLINIC | Age: 70
End: 2020-04-24
Attending: PHYSICIAN ASSISTANT
Payer: COMMERCIAL

## 2020-04-24 ENCOUNTER — OFFICE VISIT (OUTPATIENT)
Dept: FAMILY MEDICINE | Facility: CLINIC | Age: 70
End: 2020-04-24
Payer: COMMERCIAL

## 2020-04-24 VITALS
TEMPERATURE: 98.9 F | SYSTOLIC BLOOD PRESSURE: 118 MMHG | OXYGEN SATURATION: 95 % | HEART RATE: 86 BPM | BODY MASS INDEX: 27.72 KG/M2 | DIASTOLIC BLOOD PRESSURE: 64 MMHG | WEIGHT: 137.5 LBS | RESPIRATION RATE: 20 BRPM | HEIGHT: 59 IN

## 2020-04-24 DIAGNOSIS — S92.902A CLOSED FRACTURE OF LEFT FOOT, INITIAL ENCOUNTER: Primary | ICD-10-CM

## 2020-04-24 DIAGNOSIS — S92.902A CLOSED FRACTURE OF LEFT FOOT, INITIAL ENCOUNTER: ICD-10-CM

## 2020-04-24 PROCEDURE — 99213 OFFICE O/P EST LOW 20 MIN: CPT | Performed by: PHYSICIAN ASSISTANT

## 2020-04-24 PROCEDURE — 73630 X-RAY EXAM OF FOOT: CPT | Mod: LT

## 2020-04-24 ASSESSMENT — MIFFLIN-ST. JEOR: SCORE: 1064.64

## 2020-04-24 NOTE — PROGRESS NOTES
"Subjective     Michela Cole is a 69 year old female who presents to clinic today for the following health issues:    HPI   Musculoskeletal problem/pain      Duration: 1 day    Description  Location: Left foot    Intensity:  severe    Accompanying signs and symptoms: radiation of pain whole foot and swelling    History  Previous similar problem: no   Previous evaluation:  none    Precipitating or alleviating factors:  Trauma or overuse: YES  Aggravating factors include: Anything touching area and hard to move foot/toes    Therapies tried and outcome: rest/inactivity, acetaminophen and elevation      Review of Systems   ROS COMP: Constitutional, HEENT, cardiovascular, pulmonary, gi and gu systems are negative, except as otherwise noted.      Objective    /64   Pulse 86   Temp 98.9  F (37.2  C) (Oral)   Resp 20   Ht 1.499 m (4' 11.02\")   Wt 62.4 kg (137 lb 8 oz)   SpO2 95%   BMI 27.75 kg/m    Body mass index is 27.75 kg/m .  Physical Exam   GENERAL: healthy, alert and no distress  MS:Foot: Left:  normal muscle tone and decreased extension and flexion of Hallux with notable effusion of PIP and tenderness of the same  Antalgic gait. Walking boot applied. Patient has crutches at home.   SKIN: Hematomata of the same.    Diagnostic Test Results:  Xray - Over-read pending.         Assessment & Plan     1. Closed fracture of left foot, initial encounter  - XR Foot Left G/E 3 Views; Future  - Ankle/Foot Bracing Supplies Order  RICE:  Rest: Do not use affected limb or joint.  Ice: 20 minutes, 3 x daily.  Do not use heat.  Compression: Ace wrap or brace as instructed.  Elevation:  Keep joint elevated when not in use.      Return if symptoms worsen or fail to improve.   Patient amenable to this follow up plan.     Monika Vasquez PA-C  [facility]      DME (Durable Medical Equipment) Orders and Documentation  Orders Placed This Encounter   Procedures     Ankle/Foot Bracing Supplies Order    "   The patient was assessed and it was determined the patient is in need of the following listed DME Supplies/Equipment. Please complete supporting documentation below to demonstrate medical necessity.      Ankle/Foot Bracing Supplies Documentation  Patient requires the use of the ordered bracing device due to following medical need/condition: Indefinite

## 2020-04-24 NOTE — PATIENT INSTRUCTIONS
Patient Education     Foot Fracture  You have a broken bone (fracture) in your foot. This will cause pain, swelling, and often bruising. It will usually take about 4 to 8 weeks to heal. A foot fracture may be treated with a special shoe, splint, cast, or boot.  Home care  Follow these guidelines when caring for yourself at home:    You may be given a splint, cast, shoe, or boot to keep the injured area from moving. Unless you were told otherwise, use crutches or a walker. Don t put weight on the injured foot until your health care provider says you can do so. (You can rent crutches and a walker at many pharmacies and surgical or orthopedic supply stores.) Don t put weight on a splint, or it will break.    Keep your leg elevated to reduce pain and swelling. When sleeping, put a pillow under the injured leg. When sitting, support the injured leg so it is above your waist. This is very important during the first 2 days (48 hours).    Put an ice pack on the injured area. Do this for 20 minutes every 1 to 2 hours the first day for pain relief. You can make an ice pack by wrapping a plastic bag of ice cubes in a thin towel. As the ice melts, be careful that the splint, cast, boot, or shoe doesn t get wet. You can place the ice pack directly over the splint or cast. Unless told otherwise, you can open the boot or shoe to apply the ice pack. Continue using the ice pack 3 to 4 times a day for the next 2 days. Then use the ice pack as needed to ease pain and swelling.    Keep the splint, cast, boot, or shoe dry. When bathing, protect it with a large plastic bag, rubber-banded at the top end. If a fiberglass splint or cast or boot gets wet, you can dry it with a hair dryer. Unless told otherwise, you can take off the boot or shoe to bathe.    You may use acetaminophen or ibuprofen to control pain, unless another pain medicine was prescribed. If you have chronic liver or kidney disease, talk with your healthcare provider  before using these medicines. Also talk with your provider if you ve had a stomach ulcer or gastrointestinal bleeding.    Don t put creams or objects under the cast if you have itching.  Follow-up care  Follow up with your healthcare provider, or as advised. This is to make sure the bone is healing the way it should. If you were given a splint, it may be changed to a cast or boot at your follow-up visit.  X-rays may be taken. You will be told of any new findings that may affect your care.  When to seek medical advice  Call your healthcare provider right away if any of these occur:    The cast or splint cracks    The plaster cast or splint becomes wet or soft    The fiberglass cast or splint stays wet for more than 24 hours    Bad odor from the cast or wound fluid stains the cast    Tightness or pain under the cast or splint gets worse    Toes become swollen, cold, blue, numb, or tingly    You can t move your toes    Skin around cast or splint becomes red    Fever of 100.4 F (38 C) or higher, or as directed by your healthcare provider  Date Last Reviewed: 2/1/2017 2000-2019 [employer] [address] [place] [address]. All rights reserved. This information is not intended as a substitute for professional medical care. Always follow your healthcare professional's instructions.

## 2020-05-14 DIAGNOSIS — F33.1 MODERATE EPISODE OF RECURRENT MAJOR DEPRESSIVE DISORDER (H): ICD-10-CM

## 2020-05-14 DIAGNOSIS — F41.9 ANXIETY: ICD-10-CM

## 2020-05-14 NOTE — LETTER
May 18, 2020          Michela Cole,  [address]  [place] [address]            Dear Michela Cole      Your provider has sent a yi 30 day refill of sertraline (ZOLOFT) 25 MG tablet and buPROPion (WELLBUTRIN SR) 200 MG 12 hr tablet. You are due for an appointment for further refills when coming close to running out of your medication. We are currently only able to see select in person visits. We ask that you schedule a telephone visit, video visit or evisit (through [employer]) with your provider.  Please contact the clinic to schedule an appointment for further refills.     Sincerely,         [facility]/STEVE

## 2020-05-18 RX ORDER — BUPROPION HYDROCHLORIDE 200 MG/1
200 TABLET, EXTENDED RELEASE ORAL 2 TIMES DAILY
Qty: 60 TABLET | Refills: 0 | Status: SHIPPED | OUTPATIENT
Start: 2020-05-18 | End: 2020-06-01

## 2020-05-18 RX ORDER — SERTRALINE HYDROCHLORIDE 25 MG/1
TABLET, FILM COATED ORAL
Qty: 30 TABLET | Refills: 0 | Status: SHIPPED | OUTPATIENT
Start: 2020-05-18 | End: 2020-06-01

## 2020-05-18 ASSESSMENT — PATIENT HEALTH QUESTIONNAIRE - PHQ9: SUM OF ALL RESPONSES TO PHQ QUESTIONS 1-9: 5

## 2020-05-18 NOTE — TELEPHONE ENCOUNTER
Routing refill request to provider for review/approval because:  PHQ-9 score not at goal of less than 5.    Coty Verdin, PharmD  Medication Therapy Management Pharmacist  Pager: [phone]

## 2020-05-18 NOTE — TELEPHONE ENCOUNTER
"Requested Prescriptions   Pending Prescriptions Disp Refills     buPROPion (WELLBUTRIN SR) 200 MG 12 hr tablet [Pharmacy Med Name: buPROPion HCl ER (SR) Oral Tablet Extended Release 12 Hour 200 MG] 60 tablet 0     Sig: Take 1 tablet (200 mg) by mouth 2 times daily   Last Written Prescription Date:  10/21/19  Last Fill Quantity: 60,  # refills: 5   Last office visit: 4/24/2020 with prescribing provider:  Neil Blanco Office Visit:  n/a      SSRIs Protocol Failed - 5/18/2020  9:12 AM        Failed - PHQ-9 score less than 5 in past 6 months     Please review last PHQ-9 score.           Passed - Medication is Bupropion     If the medication is Bupropion (Wellbutrin), and the patient is taking for smoking cessation; OK to refill.          Passed - Medication is active on med list        Passed - Patient is age 18 or older        Passed - No active pregnancy on record        Passed - No positive pregnancy test in last 12 months        Passed - Recent (6 mo) or future (30 days) visit within the authorizing provider's specialty     Patient had office visit in the last 6 months or has a visit in the next 30 days with authorizing provider or within the authorizing provider's specialty.  See \"Patient Info\" tab in inbasket, or \"Choose Columns\" in Meds & Orders section of the refill encounter.               sertraline (ZOLOFT) 25 MG tablet [Pharmacy Med Name: Sertraline HCl Oral Tablet 25 MG] 30 tablet 0     Sig: Take 1 tablet (25 mg) by mouth daily   Last Written Prescription Date:  10/21/19  Last Fill Quantity: 30,  # refills: 5   Last office visit: 4/24/2020 with prescribing provider:  Neil Blanco Office Visit:  n/a      SSRIs Protocol Failed - 5/18/2020  9:12 AM        Failed - PHQ-9 score less than 5 in past 6 months     Please review last PHQ-9 score.           Passed - Medication is Bupropion     If the medication is Bupropion (Wellbutrin), and the patient is taking for smoking cessation; OK to refill.      " "    Passed - Medication is active on med list        Passed - Patient is age 18 or older        Passed - No active pregnancy on record        Passed - No positive pregnancy test in last 12 months        Passed - Recent (6 mo) or future (30 days) visit within the authorizing provider's specialty     Patient had office visit in the last 6 months or has a visit in the next 30 days with authorizing provider or within the authorizing provider's specialty.  See \"Patient Info\" tab in inbasket, or \"Choose Columns\" in Meds & Orders section of the refill encounter.               "

## 2020-05-18 NOTE — TELEPHONE ENCOUNTER
MA - when is she due on the depression remission quality?  Let's schedule her during that window for follow up on depression.  Virtual any type is fine.

## 2020-05-21 DIAGNOSIS — K21.9 GASTROESOPHAGEAL REFLUX DISEASE, ESOPHAGITIS PRESENCE NOT SPECIFIED: ICD-10-CM

## 2020-05-22 RX ORDER — PANTOPRAZOLE SODIUM 40 MG/1
TABLET, DELAYED RELEASE ORAL
Qty: 180 TABLET | Refills: 0 | Status: SHIPPED | OUTPATIENT
Start: 2020-05-22 | End: 2020-09-24

## 2020-05-29 ASSESSMENT — ANXIETY QUESTIONNAIRES
1. FEELING NERVOUS, ANXIOUS, OR ON EDGE: NOT AT ALL
5. BEING SO RESTLESS THAT IT IS HARD TO SIT STILL: NOT AT ALL
IF YOU CHECKED OFF ANY PROBLEMS ON THIS QUESTIONNAIRE, HOW DIFFICULT HAVE THESE PROBLEMS MADE IT FOR YOU TO DO YOUR WORK, TAKE CARE OF THINGS AT HOME, OR GET ALONG WITH OTHER PEOPLE: NOT DIFFICULT AT ALL
3. WORRYING TOO MUCH ABOUT DIFFERENT THINGS: NOT AT ALL
GAD7 TOTAL SCORE: 2
7. FEELING AFRAID AS IF SOMETHING AWFUL MIGHT HAPPEN: NOT AT ALL
2. NOT BEING ABLE TO STOP OR CONTROL WORRYING: SEVERAL DAYS
6. BECOMING EASILY ANNOYED OR IRRITABLE: NOT AT ALL

## 2020-05-29 ASSESSMENT — PATIENT HEALTH QUESTIONNAIRE - PHQ9
5. POOR APPETITE OR OVEREATING: SEVERAL DAYS
SUM OF ALL RESPONSES TO PHQ QUESTIONS 1-9: 2

## 2020-05-29 NOTE — PROGRESS NOTES
"Michela Cole is a 69 year old female who is being evaluated via a billable telephone visit.      The patient has been notified of following:     \"This telephone visit will be conducted via a call between you and your physician/provider. We have found that certain health care needs can be provided without the need for a physical exam.  This service lets us provide the care you need with a short phone conversation.  If a prescription is necessary we can send it directly to your pharmacy.  If lab work is needed we can place an order for that and you can then stop by our lab to have the test done at a later time.    Telephone visits are billed at different rates depending on your insurance coverage. During this emergency period, for some insurers they may be billed the same as an in-person visit.  Please reach out to your insurance provider with any questions.    If during the course of the call the physician/provider feels a telephone visit is not appropriate, you will not be charged for this service.\"    Patient has given verbal consent for Telephone visit?  Yes    What phone number would you like to be contacted at? [phone]    How would you like to obtain your AVS? Mail a copy    Subjective     Michela Cole is a 69 year old female who presents via phone visit today for the following health issues:    HPI  Depression and Anxiety Follow-Up    How are you doing with your depression since your last visit? Improved     How are you doing with your anxiety since your last visit?  Improved     Are you having other symptoms that might be associated with depression or anxiety? No    Have you had a significant life event? No     Do you have any concerns with your use of alcohol or other drugs? No    Social History     Tobacco Use     Smoking status: Former Smoker     Packs/day: 0.25     Years: 42.00     Pack years: 10.50     Types: Cigarettes     Last attempt to quit: 2013     Years since quittin.9 " "    Smokeless tobacco: Never Used   Substance Use Topics     Alcohol use: Yes     Alcohol/week: 6.0 standard drinks     Types: 6 Cans of beer per week     Comment: A couple times a week, \"three beers max\"     Drug use: No     PHQ 7/25/2019 5/18/2020 5/29/2020   PHQ-9 Total Score 6 5 2   Q9: Thoughts of better off dead/self-harm past 2 weeks Not at all Not at all Not at all     JACKELINE-7 SCORE 3/13/2018 7/25/2019 5/29/2020   Total Score 17 17 2     Last PHQ-9 5/29/2020   1.  Little interest or pleasure in doing things 0   2.  Feeling down, depressed, or hopeless 0   3.  Trouble falling or staying asleep, or sleeping too much 1   4.  Feeling tired or having little energy 1   5.  Poor appetite or overeating 0   6.  Feeling bad about yourself 0   7.  Trouble concentrating 0   8.  Moving slowly or restless 0   Q9: Thoughts of better off dead/self-harm past 2 weeks 0   PHQ-9 Total Score 2   Difficulty at work, home, or with people Not difficult at all     JACKELINE-7  5/29/2020   1. Feeling nervous, anxious, or on edge 0   2. Not being able to stop or control worrying 1   3. Worrying too much about different things 0   4. Trouble relaxing 1   5. Being so restless that it is hard to sit still 0   6. Becoming easily annoyed or irritable 0   7. Feeling afraid, as if something awful might happen 0   JACKELINE-7 Total Score 2   If you checked any problems, how difficult have they made it for you to do your work, take care of things at home, or get along with other people? Not difficult at all     In the past two weeks have you had thoughts of suicide or self-harm?  No.    Do you have concerns about your personal safety or the safety of others?   No    Suicide Assessment Five-step Evaluation and Treatment (SAFE-T)      How many servings of fruits and vegetables do you eat daily?  2-3    On average, how many sweetened beverages do you drink each day (Examples: soda, juice, sweet tea, etc.  Do NOT count diet or artificially sweetened beverages)? "   1    How many days per week do you exercise enough to make your heart beat faster? 5    How many minutes a day do you exercise enough to make your heart beat faster? 20 - 29  How many days per week do you miss taking your medication? 1    What makes it hard for you to take your medications?  remembering to take    COPD-  Patient notes that she has been unable to afford anoro elipta.  She notes dyspnea on exertion that has been ongoing.  She rarely uses her rescue inhaler, despite dyspnea on exertion.    Left knee- patient notes left knee pain since fracturing her left foot earlier this year.  She notes pain to medial knee and notes it's worse with bending her knee.  She denies swelling or instability.    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     Stress     Restless legs syndrome (RLS)     CAD (coronary artery disease)     Moderate episode of recurrent major depressive disorder (H)     S/P BSO (bilateral salpingo-oophorectomy)     Past Surgical History:   Procedure Laterality Date     APPENDECTOMY       C/SECTION, LOW TRANSVERSE      x 4     CORONARY ANGIOGRAPHY ADULT ORDER      Total of 5 coronary angiograms     [facility] DRUG-ELUTING STENTS, SINGLE  2014     HYSTERECTOMY TOTAL ABD, JIMENA SALPINGO-OOPHORECTOMY, NODE DISSECTION, TUMOR DEBULKING, COMBINED N/A 4/30/2018    Procedure: COMBINED HYSTERECTOMY TOTAL ABDOMINAL, SALPINGO-OOPHORECTOMY, NODE DISSECTION, TUMOR DEBULKING;;  Surgeon: Melody Bolivar MD;  Location: [facility] OR     LAPAROSCOPIC HYSTERECTOMY TOTAL, JIMENA SALPINGO-OOPHORECTOMY, NODE DISSECTION, TUMOR STAGING, COMBINED N/A 4/30/2018    Procedure: COMBINED LAPAROSCOPIC HYSTERECTOMY TOTAL, SALPINGO-OOPHORECTOMY, NODE DISSECTION, TUMOR  "STAGING;  Diagnostic Laparoscopy, Laparotomy, Extensive Lysis of Adhesions, Open Bilateral Salpingo-Oophorectomy, Partial Omentectomy, Anesthesia Block;  Surgeon: Melody Bolivar MD;  Location: [facility] OR     SPLENECTOMY      ITP     TUBAL LIGATION      x2       Social History     Tobacco Use     Smoking status: Former Smoker     Packs/day: 0.25     Years: 42.00     Pack years: 10.50     Types: Cigarettes     Last attempt to quit: 2013     Years since quittin.9     Smokeless tobacco: Never Used   Substance Use Topics     Alcohol use: Yes     Alcohol/week: 6.0 standard drinks     Types: 6 Cans of beer per week     Comment: A couple times a week, \"three beers max\"     Family History   Problem Relation Age of Onset     Cervical Cancer Mother         Lymph nodes were also involved, no chemotherapy needed.       Diabetes Father      Hyperlipidemia Father      Heart Disease Maternal Grandfather      Heart Disease Paternal Grandmother      Heart Disease Paternal Grandfather      Glaucoma No family hx of      Macular Degeneration No family hx of      Hypertension No family hx of      Cerebrovascular Disease No family hx of      Thyroid Disease No family hx of          Current Outpatient Medications   Medication Sig Dispense Refill     albuterol (PROAIR HFA/PROVENTIL HFA/VENTOLIN HFA) 108 (90 Base) MCG/ACT inhaler Inhale 2 puffs into the lungs every 6 hours as needed for shortness of breath / dyspnea 2 Inhaler 2     aspirin EC 81 MG tablet Take 81 mg by mouth every morning        buPROPion (WELLBUTRIN SR) 200 MG 12 hr tablet Take 1 tablet (200 mg) by mouth 2 times daily 180 tablet 1     diclofenac (VOLTAREN) 1 % GEL topical gel Apply  2 grams to hands four times daily using enclosed dosing card. 100 g 0     fluticasone-salmeterol (ADVAIR) 100-50 MCG/DOSE inhaler Inhale 1 puff into the lungs every 12 hours 1 Inhaler 5     isosorbide mononitrate (IMDUR) 30 MG 24 hr tablet Take 2 tablets (60 mg) by mouth daily 180 " tablet 1     levothyroxine (SYNTHROID/LEVOTHROID) 50 MCG tablet Take 1 tablet (50 mcg) by mouth daily 90 tablet 3     LORazepam (ATIVAN) 0.5 MG tablet Take 0.5-1 tablets (0.25-0.5 mg) by mouth every 8 hours as needed for anxiety 15 tablet 0     metoprolol (TOPROL-XL) 50 MG 24 hr tablet Take 1 tablet (50 mg) by mouth daily 90 tablet 3     nitroglycerin (NITROSTAT) 0.4 MG sublingual tablet Place 1 tablet (0.4 mg) under the tongue every 5 minutes as needed for chest pain 25 tablet 11     pantoprazole (PROTONIX) 40 MG EC tablet TAKE ONE TABLET BY MOUTH TWICE DAILY  180 tablet 0     rosuvastatin (CRESTOR) 20 MG tablet Take 20 mg by mouth       sertraline (ZOLOFT) 25 MG tablet Take 1 tablet (25 mg) by mouth daily 90 tablet 1     Allergies   Allergen Reactions     Sulfa Drugs Rash and Hives     Plavix [Clopidogrel] Other (See Comments)     Other reaction(s): Other - Describe In Comment Field  Not effective at preventing clots  P2Y12 testing done at [facility] in 12/2014 indicates a non responder to Plavix     Sulfasalazine Rash     BP Readings from Last 3 Encounters:   04/24/20 118/64   11/19/19 123/75   10/21/19 122/68    Wt Readings from Last 3 Encounters:   04/24/20 62.4 kg (137 lb 8 oz)   11/19/19 60.8 kg (134 lb)   10/21/19 62.1 kg (137 lb)                    Reviewed and updated as needed this visit by Provider  Tobacco  Allergies  Meds  Problems  Med Hx  Surg Hx  Fam Hx         Review of Systems   Constitutional, HEENT, cardiovascular, pulmonary, gi and gu systems are negative, except as otherwise noted.       Objective   Reported vitals:  There were no vitals taken for this visit.   healthy, alert and no distress  PSYCH: Alert and oriented times 3; coherent speech, normal   rate and volume, able to articulate logical thoughts, able   to abstract reason, no tangential thoughts, no hallucinations   or delusions  Her affect is normal  RESP: No cough, no audible wheezing, able to talk in full sentences  Remainder of  exam unable to be completed due to telephone visits    Diagnostic Test Results:  none         Assessment/Plan:  1. Moderate episode of recurrent major depressive disorder (H)  Stable.  Continue current treatment plan and medications.   - buPROPion (WELLBUTRIN SR) 200 MG 12 hr tablet; Take 1 tablet (200 mg) by mouth 2 times daily  Dispense: 180 tablet; Refill: 1    2. Anxiety  Stable.  Continue current treatment plan and medications.   - LORazepam (ATIVAN) 0.5 MG tablet; Take 0.5-1 tablets (0.25-0.5 mg) by mouth every 8 hours as needed for anxiety  Dispense: 15 tablet; Refill: 0  - sertraline (ZOLOFT) 25 MG tablet; Take 1 tablet (25 mg) by mouth daily  Dispense: 90 tablet; Refill: 1    3. Chronic obstructive pulmonary disease, unspecified COPD type (H)  Patient to see if generic advair is affordable.    - fluticasone-salmeterol (ADVAIR) 100-50 MCG/DOSE inhaler; Inhale 1 puff into the lungs every 12 hours  Dispense: 1 Inhaler; Refill: 5    4. Acute pain of left knee  Patient declines physical therapy.  She will try applying voltaren gel to the area.  There is concern for possible meniscal tear vs osteoarthritis.  She notes that she will follow-up in clinic if symptoms worsen or fail to improve.      Return in about 4 months (around 10/1/2020) for Physical Exam.      Phone call duration:  13 minutes    JOSE Ramsey CNP

## 2020-05-30 ASSESSMENT — ANXIETY QUESTIONNAIRES: GAD7 TOTAL SCORE: 2

## 2020-06-01 ENCOUNTER — VIRTUAL VISIT (OUTPATIENT)
Dept: FAMILY MEDICINE | Facility: CLINIC | Age: 70
End: 2020-06-01
Payer: COMMERCIAL

## 2020-06-01 DIAGNOSIS — F33.1 MODERATE EPISODE OF RECURRENT MAJOR DEPRESSIVE DISORDER (H): Primary | ICD-10-CM

## 2020-06-01 DIAGNOSIS — F41.9 ANXIETY: ICD-10-CM

## 2020-06-01 DIAGNOSIS — M25.562 ACUTE PAIN OF LEFT KNEE: ICD-10-CM

## 2020-06-01 DIAGNOSIS — J44.9 CHRONIC OBSTRUCTIVE PULMONARY DISEASE, UNSPECIFIED COPD TYPE (H): ICD-10-CM

## 2020-06-01 PROCEDURE — 99214 OFFICE O/P EST MOD 30 MIN: CPT | Mod: 95 | Performed by: NURSE PRACTITIONER

## 2020-06-01 RX ORDER — BUPROPION HYDROCHLORIDE 200 MG/1
200 TABLET, EXTENDED RELEASE ORAL 2 TIMES DAILY
Qty: 180 TABLET | Refills: 1 | Status: SHIPPED | OUTPATIENT
Start: 2020-06-01 | End: 2020-12-29

## 2020-06-01 RX ORDER — LORAZEPAM 0.5 MG/1
.25-.5 TABLET ORAL EVERY 8 HOURS PRN
Qty: 15 TABLET | Refills: 0 | Status: SHIPPED | OUTPATIENT
Start: 2020-06-01 | End: 2020-07-09

## 2020-06-01 RX ORDER — SERTRALINE HYDROCHLORIDE 25 MG/1
TABLET, FILM COATED ORAL
Qty: 90 TABLET | Refills: 1 | Status: SHIPPED | OUTPATIENT
Start: 2020-06-01 | End: 2020-12-29

## 2020-06-25 LAB — EJECTION FRACTION: 55 %

## 2020-07-07 NOTE — PROGRESS NOTES
Subjective     Michela Cole is a 69 year old female who presents to clinic today for the following health issues:    HPI     Hospital Follow-up Visit:    Hospital/Nursing Home/IP Rehab Facility: [facility]  Date of Admission: 6-  Date of Discharge: 6-  Reason(s) for Admiscsion: chest pain      Was your hospitalization related to COVID-19? No   Problems taking medications regularly:  None  Medication changes since discharge: None  Problems adhering to non-medication therapy:  None    Summary of hospitalization:  CareEverywhere information obtained and reviewed    SUMMARY: This is a 69 y.o. female whose medical history is significant for CAD - she is s/p intervention with multiple stent placement in 2014 and s/p coronary angiogram in 2/2019 which showed patent stents and no new lesions. She presented with several day history of atypical chest pain, most likely musculoskeletal, as it was entirely reproducible on palpation of left anterior chest wall. EKG was normal; troponin series negative. She proceeded with a cardiac stress MRI on 6/25/2020 per Cardiology recommendations which showed a very small area of ischemia at inferior wall near the ventricular septum. It was felt consistent with known PDA branch disease. Otherwise, no new area of ischemia. Continuing medical therapy was suggested. See admission H+P and Cardiology consultation note for details. No other complications. Discharge day exam is stable. See follow up orders and discharge medications above.       Diagnostic Tests/Treatments reviewed.  Follow up needed: none  Other Healthcare Providers Involved in Patient s Care:         None  Update since discharge: fluctuating course.       Patient notes left sided chest pain with every breath and with every heartbeat.  Symptoms worsen with activity and she notes fatigue.  She notes back pain to her left upper back as well.  Patient feels that pain wraps around to her back.  She denies  paresthesia.  She does not have follow-up planned with cardiology.  She is still able to clean houses, despite pain.    Patient notes left knee pain for the past 2-3 weeks.  She bumped her knee and has had pain since then.  She notes pain when pressure is applied to medial knee and will straightening her leg.  She denies swelling.    Post Discharge Medication Reconciliation: discharge medications reconciled and changed, per note/orders (see AVS).  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     Stress     Restless legs syndrome (RLS)     CAD (coronary artery disease)     Moderate episode of recurrent major depressive disorder (H)     S/P BSO (bilateral salpingo-oophorectomy)     Past Surgical History:   Procedure Laterality Date     APPENDECTOMY       C/SECTION, LOW TRANSVERSE      x 4     CORONARY ANGIOGRAPHY ADULT ORDER      Total of 5 coronary angiograms     [facility] DRUG-ELUTING STENTS, SINGLE  2014     HYSTERECTOMY TOTAL ABD, JIMENA SALPINGO-OOPHORECTOMY, NODE DISSECTION, TUMOR DEBULKING, COMBINED N/A 4/30/2018    Procedure: COMBINED HYSTERECTOMY TOTAL ABDOMINAL, SALPINGO-OOPHORECTOMY, NODE DISSECTION, TUMOR DEBULKING;;  Surgeon: Melody Bolivar MD;  Location: [facility] OR     LAPAROSCOPIC HYSTERECTOMY TOTAL, JIMENA SALPINGO-OOPHORECTOMY, NODE DISSECTION, TUMOR STAGING, COMBINED N/A 4/30/2018    Procedure: COMBINED LAPAROSCOPIC HYSTERECTOMY TOTAL, SALPINGO-OOPHORECTOMY, NODE DISSECTION, TUMOR STAGING;  Diagnostic Laparoscopy, Laparotomy, Extensive Lysis of Adhesions, Open Bilateral Salpingo-Oophorectomy, Partial Omentectomy, Anesthesia Block;  Surgeon: Melody Bolivar MD;  Location: [facility] OR      "SPLENECTOMY      ITP     TUBAL LIGATION      x2       Social History     Tobacco Use     Smoking status: Former Smoker     Packs/day: 0.25     Years: 42.00     Pack years: 10.50     Types: Cigarettes     Last attempt to quit: 2013     Years since quittin.0     Smokeless tobacco: Never Used   Substance Use Topics     Alcohol use: Yes     Alcohol/week: 6.0 standard drinks     Types: 6 Cans of beer per week     Comment: A couple times a week, \"three beers max\"     Family History   Problem Relation Age of Onset     Cervical Cancer Mother         Lymph nodes were also involved, no chemotherapy needed.       Diabetes Father      Hyperlipidemia Father      Heart Disease Maternal Grandfather      Heart Disease Paternal Grandmother      Heart Disease Paternal Grandfather      Glaucoma No family hx of      Macular Degeneration No family hx of      Hypertension No family hx of      Cerebrovascular Disease No family hx of      Thyroid Disease No family hx of          Current Outpatient Medications   Medication Sig Dispense Refill     albuterol (PROAIR HFA/PROVENTIL HFA/VENTOLIN HFA) 108 (90 Base) MCG/ACT inhaler Inhale 2 puffs into the lungs every 6 hours as needed for shortness of breath / dyspnea 2 Inhaler 2     aspirin EC 81 MG tablet Take 81 mg by mouth every morning        buPROPion (WELLBUTRIN SR) 200 MG 12 hr tablet Take 1 tablet (200 mg) by mouth 2 times daily 180 tablet 1     fluticasone-salmeterol (ADVAIR) 100-50 MCG/DOSE inhaler Inhale 1 puff into the lungs every 12 hours 1 Inhaler 5     isosorbide mononitrate (IMDUR) 30 MG 24 hr tablet Take 2 tablets (60 mg) by mouth daily 180 tablet 1     levothyroxine (SYNTHROID/LEVOTHROID) 50 MCG tablet Take 1 tablet (50 mcg) by mouth daily 90 tablet 3     LORazepam (ATIVAN) 0.5 MG tablet Take 0.5-1 tablets (0.25-0.5 mg) by mouth every 8 hours as needed for anxiety 15 tablet 0     methocarbamol (ROBAXIN) 500 MG tablet Take 1 tablet (500 mg) by mouth 4 times daily as " needed for muscle spasms 30 tablet 1     metoprolol (TOPROL-XL) 50 MG 24 hr tablet Take 1 tablet (50 mg) by mouth daily 90 tablet 3     nitroglycerin (NITROSTAT) 0.4 MG sublingual tablet Place 1 tablet (0.4 mg) under the tongue every 5 minutes as needed for chest pain 25 tablet 11     pantoprazole (PROTONIX) 40 MG EC tablet TAKE ONE TABLET BY MOUTH TWICE DAILY  180 tablet 0     rosuvastatin (CRESTOR) 20 MG tablet Take 20 mg by mouth       sertraline (ZOLOFT) 25 MG tablet Take 1 tablet (25 mg) by mouth daily 90 tablet 1     Allergies   Allergen Reactions     Sulfa Drugs Rash and Hives     Plavix [Clopidogrel] Other (See Comments)     Other reaction(s): Other - Describe In Comment Field  Not effective at preventing clots  P2Y12 testing done at [facility] in 12/2014 indicates a non responder to Plavix     Sulfasalazine Rash     BP Readings from Last 3 Encounters:   07/09/20 132/86   04/24/20 118/64   11/19/19 123/75    Wt Readings from Last 3 Encounters:   07/09/20 60.7 kg (133 lb 14.4 oz)   04/24/20 62.4 kg (137 lb 8 oz)   11/19/19 60.8 kg (134 lb)                 Reviewed and updated as needed this visit by Provider         Review of Systems   Constitutional, HEENT, cardiovascular, pulmonary, gi and gu systems are negative, except as otherwise noted.      Objective    There were no vitals taken for this visit.  There is no height or weight on file to calculate BMI.  Physical Exam   GENERAL: healthy, alert and no distress  RESP: lungs clear to auscultation - no rales, rhonchi or wheezes  CV: regular rate and rhythm, normal S1 S2, no S3 or S4, no murmur, click or rub, no peripheral edema and peripheral pulses strong  MS: Tenderness to palpation of left thoracic back, left lateral chest; pain present with lateral turning of thoracic spine.  Left knee: full range of motion present, crepitus noted; tenderness to palpation of pes anserine bursa; negative varus/valgus stress tests; negative anterior/posterior drawer tests;  "negative Kit's test.      Diagnostic Test Results:  none         Assessment & Plan     1. Anxiety  Refill requested.   - LORazepam (ATIVAN) 0.5 MG tablet; Take 0.5-1 tablets (0.25-0.5 mg) by mouth every 8 hours as needed for anxiety  Dispense: 15 tablet; Refill: 0    2. Coronary artery disease involving native coronary artery of native heart without angina pectoris  I encouraged patient to touch base with her regular cardiologist.  Cardiology consult recommended ranexa, but patient was not discharged on medication.  Patient advised to again seek emergency care if symptoms are severe.  I do think that there is a likely musculoskeletal component to pain, but given her cardiac history, I am concerned she may have a cardiac component as well.    3. Acute pain of left knee  Possible pes anserine bursitis.  Patient advised to use compression to area  - XR Knee Left 3 Views; Future    4. Acute left-sided thoracic back pain    - XR Thoracic Spine 3 Views; Future  - methocarbamol (ROBAXIN) 500 MG tablet; Take 1 tablet (500 mg) by mouth 4 times daily as needed for muscle spasms  Dispense: 30 tablet; Refill: 1     BMI:   Estimated body mass index is 26.86 kg/m  as calculated from the following:    Height as of this encounter: 1.504 m (4' 11.2\").    Weight as of this encounter: 60.7 kg (133 lb 14.4 oz).               Return in about 2 weeks (around 7/23/2020) for no improvement in symptoms., Follow-up sooner if symptoms worsen..    JOSE Ramsey [facility]      "

## 2020-07-09 ENCOUNTER — ANCILLARY PROCEDURE (OUTPATIENT)
Dept: GENERAL RADIOLOGY | Facility: CLINIC | Age: 70
End: 2020-07-09
Attending: NURSE PRACTITIONER
Payer: COMMERCIAL

## 2020-07-09 ENCOUNTER — OFFICE VISIT (OUTPATIENT)
Dept: FAMILY MEDICINE | Facility: CLINIC | Age: 70
End: 2020-07-09
Payer: COMMERCIAL

## 2020-07-09 VITALS
RESPIRATION RATE: 18 BRPM | SYSTOLIC BLOOD PRESSURE: 132 MMHG | HEART RATE: 75 BPM | DIASTOLIC BLOOD PRESSURE: 86 MMHG | BODY MASS INDEX: 27 KG/M2 | WEIGHT: 133.9 LBS | HEIGHT: 59 IN | TEMPERATURE: 97.1 F | OXYGEN SATURATION: 98 %

## 2020-07-09 DIAGNOSIS — M54.6 ACUTE LEFT-SIDED THORACIC BACK PAIN: ICD-10-CM

## 2020-07-09 DIAGNOSIS — M25.562 ACUTE PAIN OF LEFT KNEE: ICD-10-CM

## 2020-07-09 DIAGNOSIS — F41.9 ANXIETY: ICD-10-CM

## 2020-07-09 DIAGNOSIS — I25.10 CORONARY ARTERY DISEASE INVOLVING NATIVE CORONARY ARTERY OF NATIVE HEART WITHOUT ANGINA PECTORIS: Primary | ICD-10-CM

## 2020-07-09 PROCEDURE — 73562 X-RAY EXAM OF KNEE 3: CPT | Mod: LT

## 2020-07-09 PROCEDURE — 72072 X-RAY EXAM THORAC SPINE 3VWS: CPT

## 2020-07-09 PROCEDURE — 99214 OFFICE O/P EST MOD 30 MIN: CPT | Performed by: NURSE PRACTITIONER

## 2020-07-09 RX ORDER — LORAZEPAM 0.5 MG/1
.25-.5 TABLET ORAL EVERY 8 HOURS PRN
Qty: 15 TABLET | Refills: 0 | Status: SHIPPED | OUTPATIENT
Start: 2020-07-09 | End: 2020-08-24

## 2020-07-09 RX ORDER — METHOCARBAMOL 500 MG/1
500 TABLET, FILM COATED ORAL 4 TIMES DAILY PRN
Qty: 30 TABLET | Refills: 1 | Status: SHIPPED | OUTPATIENT
Start: 2020-07-09 | End: 2020-10-21

## 2020-07-09 ASSESSMENT — MIFFLIN-ST. JEOR: SCORE: 1051.17

## 2020-07-10 DIAGNOSIS — M25.562 LEFT KNEE PAIN: ICD-10-CM

## 2020-07-10 DIAGNOSIS — M17.10 ARTHRITIS OF KNEE: ICD-10-CM

## 2020-07-10 DIAGNOSIS — M54.6 THORACIC BACK PAIN: Primary | ICD-10-CM

## 2020-08-19 ENCOUNTER — OFFICE VISIT (OUTPATIENT)
Dept: FAMILY MEDICINE | Facility: CLINIC | Age: 70
End: 2020-08-19
Payer: COMMERCIAL

## 2020-08-19 VITALS
TEMPERATURE: 98.1 F | WEIGHT: 135 LBS | SYSTOLIC BLOOD PRESSURE: 112 MMHG | OXYGEN SATURATION: 98 % | DIASTOLIC BLOOD PRESSURE: 84 MMHG | BODY MASS INDEX: 27.08 KG/M2 | HEART RATE: 72 BPM

## 2020-08-19 DIAGNOSIS — R10.31 RIGHT GROIN PAIN: Primary | ICD-10-CM

## 2020-08-19 PROCEDURE — 99214 OFFICE O/P EST MOD 30 MIN: CPT | Performed by: NURSE PRACTITIONER

## 2020-08-19 NOTE — PROGRESS NOTES
Laly Cole is a 69 year old female who presents to clinic today for the following health issues:    HPI       Groin Pain    Onset: 3 weeks   Description: right sided groin pain  Intensity: moderate, severe  Progression of Symptoms:  worsening  Accompanying Signs & Symptoms: none  Previous history of similar problem: previous history of hernia repairs in that area  Precipitating factors:        Worsened by: none  Alleviating factors:        Improved by: none  Therapies tried and outcome: None      Review of Systems   Constitutional, HEENT, cardiovascular, pulmonary, gi and gu systems are negative, except as otherwise noted.      Objective    /84 (BP Location: Left arm, Patient Position: Chair, Cuff Size: Adult Regular)   Pulse 72   Temp 98.1  F (36.7  C) (Oral)   Wt 61.2 kg (135 lb)   SpO2 98%   BMI 27.08 kg/m    Body mass index is 27.08 kg/m .  Physical Exam   GENERAL: healthy, alert and no distress  ABDOMEN: soft, nontender. No palpable inguinal hernia  MS: Very tender right groin to light palpation of hip flexors.     No results found for this or any previous visit (from the past 24 hour(s)).        Assessment & Plan     Right groin pain  No palpable hernias- tenderness seems to be over hip flexors, but will r/o any recurrence of hernias or problems with mesh. If negative CT, will refer for Physical Therapy.  - CT Abdomen Pelvis w/o Contrast; Future       See Patient Instructions    Return in about 1 week (around 8/26/2020) for CT scan.    JOSE Mccoy [facility]

## 2020-08-20 DIAGNOSIS — E03.9 HYPOTHYROIDISM, UNSPECIFIED TYPE: ICD-10-CM

## 2020-08-20 RX ORDER — LEVOTHYROXINE SODIUM 50 UG/1
50 TABLET ORAL DAILY
Qty: 30 TABLET | Refills: 0 | Status: SHIPPED | OUTPATIENT
Start: 2020-08-20 | End: 2020-09-21

## 2020-08-21 DIAGNOSIS — F41.9 ANXIETY: ICD-10-CM

## 2020-08-21 NOTE — TELEPHONE ENCOUNTER
Controlled Substance Refill Request for LORazepam (ATIVAN) 0.5 MG tablet   Problem List Complete:  No     PROVIDER TO CONSIDER COMPLETION OF PROBLEM LIST AND OVERVIEW/CONTROLLED SUBSTANCE AGREEMENT    Last Written Prescription Date:  07/09/2020  Last Fill Quantity: 15,   # refills: 0    THE MOST RECENT OFFICE VISIT MUST BE WITHIN THE PAST 3 MONTHS. AT LEAST ONE FACE TO FACE VISIT MUST OCCUR EVERY 6 MONTHS. ADDITIONAL VISITS CAN BE VIRTUAL.  (THIS STATEMENT SHOULD BE DELETED.)    Last Office Visit with [facility] primary care provider: 08/19/2020    Future Office visit:     Controlled substance agreement:   Encounter-Level CSA - 04/03/2017:    Controlled Substance Agreement - Scan on 4/10/2017 12:39 PM: CONTROLLED SUBSTANCE AGREEMENT     Patient-Level CSA:    There are no patient-level csa.         Last Urine Drug Screen: No results found for: CDAUT, No results found for: COMDAT, No results found for: THC13, PCP13, COC13, MAMP13, OPI13, AMP13, BZO13, TCA13, MTD13, BAR13, OXY13, PPX13, BUP13     Processing:  Rx to be electronically transmitted to pharmacy by provider      [email or web address]       checked in past 3 months?  No, route to JOHN Ryan CMA

## 2020-08-24 ENCOUNTER — ANCILLARY PROCEDURE (OUTPATIENT)
Dept: CT IMAGING | Facility: CLINIC | Age: 70
End: 2020-08-24
Attending: NURSE PRACTITIONER
Payer: COMMERCIAL

## 2020-08-24 DIAGNOSIS — R10.31 RIGHT GROIN PAIN: ICD-10-CM

## 2020-08-24 PROCEDURE — 74176 CT ABD & PELVIS W/O CONTRAST: CPT | Mod: TC

## 2020-08-24 RX ORDER — LORAZEPAM 0.5 MG/1
.25-.5 TABLET ORAL EVERY 8 HOURS PRN
Qty: 15 TABLET | Refills: 0 | Status: SHIPPED | OUTPATIENT
Start: 2020-08-24

## 2020-08-25 NOTE — RESULT ENCOUNTER NOTE
Please call the patient with these results:    Rose, the CT shows a small hernia in the abdomen near the belly button, but there are no hernias in the groin. I do believe your pain is from your hip flexors, and I recommend Physical Therapy. I've placed a referral for this.     Neeta Monge, CNP

## 2020-08-25 NOTE — TELEPHONE ENCOUNTER
[place] reviewed:  reviewed- no concerns  Last refilled 7/9/2020  [place]  [email or web address]

## 2020-09-12 DIAGNOSIS — J44.9 CHRONIC OBSTRUCTIVE PULMONARY DISEASE, UNSPECIFIED COPD TYPE (H): ICD-10-CM

## 2020-09-14 DIAGNOSIS — J44.9 CHRONIC OBSTRUCTIVE PULMONARY DISEASE, UNSPECIFIED COPD TYPE (H): ICD-10-CM

## 2020-09-15 RX ORDER — ALBUTEROL SULFATE 90 UG/1
AEROSOL, METERED RESPIRATORY (INHALATION)
Qty: 36 G | Refills: 0 | Status: SHIPPED | OUTPATIENT
Start: 2020-09-15 | End: 2020-10-02

## 2020-09-15 NOTE — TELEPHONE ENCOUNTER
Pharmacy calling. The Rx sent was not to substitute. The brand name of ventolin, is it medically necessary? Please call and let know.

## 2020-09-16 RX ORDER — ALBUTEROL SULFATE 90 UG/1
AEROSOL, METERED RESPIRATORY (INHALATION)
Qty: 36 G | Refills: 0 | OUTPATIENT
Start: 2020-09-16

## 2020-09-16 NOTE — TELEPHONE ENCOUNTER
Denial sent, however, uncertain if brand name is necessary.  Please contact patient to determine.  Miguelina Espinosa RN

## 2020-09-17 DIAGNOSIS — I25.10 CORONARY ARTERY DISEASE INVOLVING NATIVE CORONARY ARTERY OF NATIVE HEART WITHOUT ANGINA PECTORIS: Primary | ICD-10-CM

## 2020-09-17 DIAGNOSIS — E03.9 HYPOTHYROIDISM, UNSPECIFIED TYPE: ICD-10-CM

## 2020-09-17 NOTE — TELEPHONE ENCOUNTER
Routing refill request to provider for review/approval because:  Melanie given x1 and patient did not follow up, please advise  Labs not current:    TSH   Date Value Ref Range Status   07/25/2019 3.78 0.40 - 4.00 mU/L Final

## 2020-09-18 NOTE — TELEPHONE ENCOUNTER
Okay for refill once labs for TSH reflex Free T4, lipid panel, glucose, alt, hgb ordered and lab appointment in place.    Swapna Edwards, CNP

## 2020-09-18 NOTE — TELEPHONE ENCOUNTER
Left message for patient to return call- please help patient schedule fasting lab appointment. Clare Kc MA

## 2020-09-18 NOTE — TELEPHONE ENCOUNTER
Routing to provider- Please clarify associating Dx  Lab orders pended    Routing to team- please schedule fasting lab appointment    Coral Gurrola RN

## 2020-09-21 RX ORDER — LEVOTHYROXINE SODIUM 50 UG/1
TABLET ORAL
Qty: 30 TABLET | Refills: 0 | Status: SHIPPED | OUTPATIENT
Start: 2020-09-21 | End: 2020-10-21

## 2020-09-21 NOTE — TELEPHONE ENCOUNTER
Spoke to patient and made lab appointment for 9/29/2020.  Umu CAMPOVERDE CMA ([facility])

## 2020-09-29 DIAGNOSIS — E03.9 HYPOTHYROIDISM, UNSPECIFIED TYPE: ICD-10-CM

## 2020-09-29 DIAGNOSIS — I25.10 CORONARY ARTERY DISEASE INVOLVING NATIVE CORONARY ARTERY OF NATIVE HEART WITHOUT ANGINA PECTORIS: ICD-10-CM

## 2020-09-29 LAB
ALT SERPL W P-5'-P-CCNC: 29 U/L (ref 0–50)
CHOLEST SERPL-MCNC: 176 MG/DL
GLUCOSE SERPL-MCNC: 100 MG/DL (ref 70–99)
HDLC SERPL-MCNC: 84 MG/DL
HGB BLD-MCNC: 12.7 G/DL (ref 11.7–15.7)
LDLC SERPL CALC-MCNC: 68 MG/DL
NONHDLC SERPL-MCNC: 92 MG/DL
TRIGL SERPL-MCNC: 119 MG/DL
TSH SERPL DL<=0.005 MIU/L-ACNC: 2.13 MU/L (ref 0.4–4)

## 2020-09-29 PROCEDURE — 84443 ASSAY THYROID STIM HORMONE: CPT | Performed by: FAMILY MEDICINE

## 2020-09-29 PROCEDURE — 84460 ALANINE AMINO (ALT) (SGPT): CPT | Performed by: FAMILY MEDICINE

## 2020-09-29 PROCEDURE — 80061 LIPID PANEL: CPT | Performed by: FAMILY MEDICINE

## 2020-09-29 PROCEDURE — 36415 COLL VENOUS BLD VENIPUNCTURE: CPT | Performed by: FAMILY MEDICINE

## 2020-09-29 PROCEDURE — 85018 HEMOGLOBIN: CPT | Performed by: FAMILY MEDICINE

## 2020-09-29 PROCEDURE — 82947 ASSAY GLUCOSE BLOOD QUANT: CPT | Performed by: FAMILY MEDICINE

## 2020-10-02 RX ORDER — ALBUTEROL SULFATE 90 UG/1
2 AEROSOL, METERED RESPIRATORY (INHALATION) EVERY 6 HOURS PRN
Qty: 36 G | Refills: 0 | Status: SHIPPED | OUTPATIENT
Start: 2020-10-02

## 2020-10-02 NOTE — TELEPHONE ENCOUNTER
Generic prescription sent and note to pharmacy to dispense whichever brand/generic that is covered by insurance    Coral Gurrola RN

## 2020-10-21 DIAGNOSIS — M54.6 ACUTE LEFT-SIDED THORACIC BACK PAIN: ICD-10-CM

## 2020-10-21 DIAGNOSIS — E03.9 HYPOTHYROIDISM, UNSPECIFIED TYPE: ICD-10-CM

## 2020-10-21 RX ORDER — LEVOTHYROXINE SODIUM 50 UG/1
TABLET ORAL
Qty: 90 TABLET | Refills: 1 | Status: SHIPPED | OUTPATIENT
Start: 2020-10-21 | End: 2021-05-03

## 2020-10-21 RX ORDER — METHOCARBAMOL 500 MG/1
500 TABLET, FILM COATED ORAL 4 TIMES DAILY PRN
Qty: 30 TABLET | Refills: 0 | Status: SHIPPED | OUTPATIENT
Start: 2020-10-21

## 2020-10-21 NOTE — TELEPHONE ENCOUNTER
methocarbamol (ROBAXIN) 500 MG tablet      Last Written Prescription Date:  07/09/2020  Last Fill Quantity: 30,   # refills: 1  Last Office Visit: 08/19/2020  Future Office visit:       Routing refill request to provider for review/approval because:  Drug not on the [facility], UMP or  Health refill protocol or controlled substance  An Ryan, CMA

## 2020-10-21 NOTE — TELEPHONE ENCOUNTER
Prescription approved per [facility] Refill Protocol.  Miguelina Espinosa RN

## 2020-11-10 ENCOUNTER — TRANSFERRED RECORDS (OUTPATIENT)
Dept: HEALTH INFORMATION MANAGEMENT | Facility: CLINIC | Age: 70
End: 2020-11-10

## 2020-12-27 DIAGNOSIS — F33.1 MODERATE EPISODE OF RECURRENT MAJOR DEPRESSIVE DISORDER (H): ICD-10-CM

## 2020-12-27 DIAGNOSIS — F41.9 ANXIETY: ICD-10-CM

## 2020-12-28 DIAGNOSIS — F41.9 ANXIETY: ICD-10-CM

## 2020-12-29 RX ORDER — BUPROPION HYDROCHLORIDE 200 MG/1
200 TABLET, EXTENDED RELEASE ORAL 2 TIMES DAILY
Qty: 180 TABLET | Refills: 0 | Status: SHIPPED | OUTPATIENT
Start: 2020-12-29 | End: 2021-04-03

## 2020-12-29 RX ORDER — SERTRALINE HYDROCHLORIDE 25 MG/1
TABLET, FILM COATED ORAL
Qty: 90 TABLET | Refills: 0 | Status: SHIPPED | OUTPATIENT
Start: 2020-12-29 | End: 2021-04-03

## 2020-12-29 RX ORDER — LORAZEPAM 0.5 MG/1
TABLET ORAL
Start: 2020-12-29

## 2020-12-29 NOTE — TELEPHONE ENCOUNTER
"Requested Prescriptions   Pending Prescriptions Disp Refills     buPROPion (WELLBUTRIN SR) 200 MG 12 hr tablet [Pharmacy Med Name: buPROPion HCl ER (SR) Oral Tablet Extended Release 12 Hour 200 MG] 180 tablet 0     Sig: Take 1 tablet (200 mg) by mouth 2 times daily       SSRIs Protocol Failed - 12/27/2020  7:33 PM        Failed - PHQ-9 score less than 5 in past 6 months     Please review last PHQ-9 score.           Passed - Medication is Bupropion     If the medication is Bupropion (Wellbutrin), and the patient is taking for smoking cessation; OK to refill.          Passed - Medication is active on med list        Passed - Patient is age 18 or older        Passed - No active pregnancy on record        Passed - No positive pregnancy test in last 12 months        Passed - Recent (6 mo) or future (30 days) visit within the authorizing provider's specialty     Patient had office visit in the last 6 months or has a visit in the next 30 days with authorizing provider or within the authorizing provider's specialty.  See \"Patient Info\" tab in inbasket, or \"Choose Columns\" in Meds & Orders section of the refill encounter.               sertraline (ZOLOFT) 25 MG tablet [Pharmacy Med Name: Sertraline HCl Oral Tablet 25 MG] 90 tablet 0     Sig: Take 1 tablet (25 mg) by mouth daily       SSRIs Protocol Failed - 12/27/2020  7:33 PM        Failed - PHQ-9 score less than 5 in past 6 months     Please review last PHQ-9 score.           Passed - Medication is Bupropion     If the medication is Bupropion (Wellbutrin), and the patient is taking for smoking cessation; OK to refill.          Passed - Medication is active on med list        Passed - Patient is age 18 or older        Passed - No active pregnancy on record        Passed - No positive pregnancy test in last 12 months        Passed - Recent (6 mo) or future (30 days) visit within the authorizing provider's specialty     Patient had office visit in the last 6 months or has a " "visit in the next 30 days with authorizing provider or within the authorizing provider's specialty.  See \"Patient Info\" tab in inbasket, or \"Choose Columns\" in Meds & Orders section of the refill encounter.               Routing refill request to provider for review/approval because:  Failed protocol.  Chelsea ALVARADON-RN  [facility]    "

## 2020-12-29 NOTE — TELEPHONE ENCOUNTER
RX [place] reviewed:  reviewed- no concern  [place]:  [email or web address]  Last filled-8/25/20  Nelda Appiah RN

## 2020-12-29 NOTE — TELEPHONE ENCOUNTER
Controlled Substance Refill Request for LORazepam Oral Tablet 0.5 MG  Problem List Complete:  No     PROVIDER TO CONSIDER COMPLETION OF PROBLEM LIST AND OVERVIEW/CONTROLLED SUBSTANCE AGREEMENT    Last Written Prescription Date:  08/24/2020  Last Fill Quantity: 15,   # refills: 0    THE MOST RECENT OFFICE VISIT MUST BE WITHIN THE PAST 3 MONTHS. AT LEAST ONE FACE TO FACE VISIT MUST OCCUR EVERY 6 MONTHS. ADDITIONAL VISITS CAN BE VIRTUAL.  (THIS STATEMENT SHOULD BE DELETED.)    Last Office Visit with [facility] primary care provider: 08/19/2020    Future Office visit:     Controlled substance agreement:   Encounter-Level CSA - 04/03/2017:    Controlled Substance Agreement - Scan on 4/10/2017 12:39 PM: CONTROLLED SUBSTANCE AGREEMENT     Patient-Level CSA:    There are no patient-level csa.         Last Urine Drug Screen: No results found for: CDAUT, No results found for: COMDAT, No results found for: THC13, PCP13, COC13, MAMP13, OPI13, AMP13, BZO13, TCA13, MTD13, BAR13, OXY13, PPX13, BUP13     Processing:  Rx to be electronically transmitted to pharmacy by provider      [email or web address]       checked in past 3 months?  No, route to JOHN Ryan CMA

## 2021-02-05 DIAGNOSIS — F41.9 ANXIETY: ICD-10-CM

## 2021-02-09 RX ORDER — LORAZEPAM 0.5 MG/1
TABLET ORAL
Qty: 15 TABLET | Refills: 0 | OUTPATIENT
Start: 2021-02-09

## 2021-02-09 NOTE — TELEPHONE ENCOUNTER
Patient states that her insurance changed to [employer] which doesn't cover [place]. Wondering  If she can have 1 refill while she finds a new provider. Please advise.

## 2021-02-11 NOTE — TELEPHONE ENCOUNTER
Called patient and informed of message and understood will try to change insurance and go from there. Patient cant pay for office visit, will have to go without meds until then.         Lia MIRANDA CMA ([facility])

## 2021-04-01 DIAGNOSIS — F41.9 ANXIETY: ICD-10-CM

## 2021-04-01 DIAGNOSIS — F33.1 MODERATE EPISODE OF RECURRENT MAJOR DEPRESSIVE DISORDER (H): ICD-10-CM

## 2021-04-03 RX ORDER — SERTRALINE HYDROCHLORIDE 25 MG/1
TABLET, FILM COATED ORAL
Qty: 90 TABLET | Refills: 0 | Status: SHIPPED | OUTPATIENT
Start: 2021-04-03 | End: 2021-07-13

## 2021-04-03 RX ORDER — BUPROPION HYDROCHLORIDE 200 MG/1
200 TABLET, EXTENDED RELEASE ORAL 2 TIMES DAILY
Qty: 180 TABLET | Refills: 0 | Status: SHIPPED | OUTPATIENT
Start: 2021-04-03 | End: 2021-08-08

## 2021-05-02 DIAGNOSIS — E03.9 HYPOTHYROIDISM, UNSPECIFIED TYPE: ICD-10-CM

## 2021-05-03 RX ORDER — LEVOTHYROXINE SODIUM 50 UG/1
TABLET ORAL
Qty: 90 TABLET | Refills: 0 | Status: SHIPPED | OUTPATIENT
Start: 2021-05-03 | End: 2021-08-05

## 2021-07-11 DIAGNOSIS — K21.9 GASTROESOPHAGEAL REFLUX DISEASE: ICD-10-CM

## 2021-07-11 DIAGNOSIS — F41.9 ANXIETY: ICD-10-CM

## 2021-07-13 RX ORDER — SERTRALINE HYDROCHLORIDE 25 MG/1
TABLET, FILM COATED ORAL
Qty: 90 TABLET | Refills: 0 | Status: SHIPPED | OUTPATIENT
Start: 2021-07-13 | End: 2021-11-02

## 2021-07-13 RX ORDER — PANTOPRAZOLE SODIUM 40 MG/1
40 TABLET, DELAYED RELEASE ORAL 2 TIMES DAILY
Qty: 180 TABLET | Refills: 0 | Status: SHIPPED | OUTPATIENT
Start: 2021-07-13 | End: 2021-10-17

## 2021-07-13 NOTE — TELEPHONE ENCOUNTER
"Requested Prescriptions   Pending Prescriptions Disp Refills     pantoprazole (PROTONIX) 40 MG EC tablet [Pharmacy Med Name: Pantoprazole Sodium Oral Tablet Delayed Release 40 MG] 180 tablet 0     Sig: TAKE ONE TABLET BY MOUTH TWICE DAILY       PPI Protocol Passed - 7/11/2021 11:50 AM        Passed - Not on Clopidogrel (unless Pantoprazole ordered)        Passed - No diagnosis of osteoporosis on record        Passed - Recent (12 mo) or future (30 days) visit within the authorizing provider's specialty     Patient has had an office visit with the authorizing provider or a provider within the authorizing providers department within the previous 12 mos or has a future within next 30 days. See \"Patient Info\" tab in inbasket, or \"Choose Columns\" in Meds & Orders section of the refill encounter.              Passed - Medication is active on med list        Passed - Patient is age 18 or older        Passed - No active pregnacy on record        Passed - No positive pregnancy test in past 12 months           Left detailed message on patient's identified voice mail.  Advised patient to call [phone] at her earliest convenience and schedule a Physical appointment.  "

## 2021-08-05 DIAGNOSIS — E03.9 HYPOTHYROIDISM, UNSPECIFIED TYPE: ICD-10-CM

## 2021-08-05 DIAGNOSIS — F33.1 MODERATE EPISODE OF RECURRENT MAJOR DEPRESSIVE DISORDER (H): ICD-10-CM

## 2021-08-05 RX ORDER — LEVOTHYROXINE SODIUM 50 UG/1
TABLET ORAL
Qty: 90 TABLET | Refills: 0 | Status: SHIPPED | OUTPATIENT
Start: 2021-08-05 | End: 2021-11-02

## 2021-08-08 RX ORDER — BUPROPION HYDROCHLORIDE 200 MG/1
200 TABLET, EXTENDED RELEASE ORAL 2 TIMES DAILY
Qty: 180 TABLET | Refills: 0 | Status: SHIPPED | OUTPATIENT
Start: 2021-08-08 | End: 2021-11-24

## 2021-08-17 DIAGNOSIS — J44.9 CHRONIC OBSTRUCTIVE PULMONARY DISEASE, UNSPECIFIED COPD TYPE (H): ICD-10-CM

## 2021-09-01 ENCOUNTER — TELEPHONE (OUTPATIENT)
Dept: FAMILY MEDICINE | Facility: CLINIC | Age: 71
End: 2021-09-01

## 2021-09-01 NOTE — LETTER
September 15, 2021      Michela MERCADO [place]  [address]  [place] [address]        Dear Michela,     We have been trying to contact you about your a medication request we are have received. It also looks like you need to establish care with a new provider now that Dr. Allen is no longer with our practice. You can call [phone] to set up an appointment to establish care with a new provider and to speak with our nurse team about your medication.      Sincerely,      NERY [facility] Kenton Leonard

## 2021-09-01 NOTE — TELEPHONE ENCOUNTER
fluoxetine      Last Written Prescription Date:    Last Fill Quantity: ,   # refills:   Last Office Visit:   Future Office visit:       Routing refill request to provider for review/approval because:  Drug not active on patient's medication list

## 2021-09-06 NOTE — TELEPHONE ENCOUNTER
Fluoxetine was d/c years ago, now on sertraline.      Sertraline was RF for #90d about 6W ago    Patient should be seen by a new PCP, please offer to help schedule f2f or vv with an available provider. Thank you

## 2021-09-07 NOTE — TELEPHONE ENCOUNTER
Left message on answering machine for patient to call back to the nurse at [phone].    Umu Reid RN  [facility]

## 2021-09-08 NOTE — TELEPHONE ENCOUNTER
2nd attempt to reach.    Left message on answering machine for patient to call back to the nurse at [phone].    Umu Reid RN  [facility]

## 2021-09-15 NOTE — TELEPHONE ENCOUNTER
Attempt to contact patient x 2 without response.     Team, please send call back letter.     Harmony Hill RN, BSN, PHN  [facility]

## 2021-10-29 DIAGNOSIS — E03.9 HYPOTHYROIDISM, UNSPECIFIED TYPE: ICD-10-CM

## 2021-10-29 DIAGNOSIS — F41.9 ANXIETY: ICD-10-CM

## 2021-11-02 RX ORDER — SERTRALINE HYDROCHLORIDE 25 MG/1
TABLET, FILM COATED ORAL
Qty: 90 TABLET | Refills: 0 | Status: SHIPPED | OUTPATIENT
Start: 2021-11-02

## 2021-11-02 RX ORDER — LEVOTHYROXINE SODIUM 50 UG/1
TABLET ORAL
Qty: 90 TABLET | Refills: 0 | Status: SHIPPED | OUTPATIENT
Start: 2021-11-02

## 2021-11-02 NOTE — TELEPHONE ENCOUNTER
Medication is being filled for 1 time refill only due to:  Patient needs to be seen because it has been more than one year since last visit. previous encounter sent to team to schedule visit.     Ann Hanson RN

## 2021-11-02 NOTE — TELEPHONE ENCOUNTER
Spoke to patient.  Informed her that prescription was sent in for 90 days, and that she would need an appointment.  She said she is not able to come to [facility] as we do not take [employer].    She said she will check around to see what clinic she can go to, or possibly change insurance?    Leidy Fuentes,

## 2021-11-02 NOTE — TELEPHONE ENCOUNTER
Levothyroxine (Synthroid) is being filled for 1 time refill only due to:  Patient needs to be seen because it has been more than one year since last visit.   Last office visit was on 8/19/2020, please notify patient she is over due for visit, needs to schedule for further refills.   Ann Hanson RN

## 2021-11-19 DIAGNOSIS — F33.1 MODERATE EPISODE OF RECURRENT MAJOR DEPRESSIVE DISORDER (H): ICD-10-CM

## 2021-11-19 DIAGNOSIS — K21.9 GASTROESOPHAGEAL REFLUX DISEASE, UNSPECIFIED WHETHER ESOPHAGITIS PRESENT: ICD-10-CM

## 2021-11-21 NOTE — TELEPHONE ENCOUNTER
Routing refill request to provider for review/approval because:  Melanie given x1 and patient did not follow up, please advise  Patient needs to be seen because it has been more than 1 year since last office visit.

## 2021-11-22 NOTE — TELEPHONE ENCOUNTER
Patient already given yi refill. Was last seen 8/19/20, over due for annual visit, please call patient to schedule.   Ann Hanson RN

## 2021-11-24 RX ORDER — BUPROPION HYDROCHLORIDE 200 MG/1
200 TABLET, EXTENDED RELEASE ORAL 2 TIMES DAILY
Qty: 180 TABLET | Refills: 0 | Status: SHIPPED | OUTPATIENT
Start: 2021-11-24

## 2021-11-24 RX ORDER — PANTOPRAZOLE SODIUM 40 MG/1
TABLET, DELAYED RELEASE ORAL
Qty: 60 TABLET | Refills: 0 | Status: SHIPPED | OUTPATIENT
Start: 2021-11-24

## 2021-11-24 NOTE — TELEPHONE ENCOUNTER
Contacted pt and cannot schedule due to pt being with [employer] insurance. Pt is looking for new provider and is debating changing insurances to be able to be seen again within [place]. Pt is wondering if provider would give one more yi refill on buPROPion (WELLBUTRIN SR) 200 MG 12 hr tablet and pantoprazole (PROTONIX) 40 MG EC tablet until she is able to be seen. Routing to provider to advise.  Genny Lee-  [place]

## 2021-11-24 NOTE — TELEPHONE ENCOUNTER
Contacted pt in separate encounter and cannot schedule due to pt being with [employer] insurance. Pt is looking for new provider and is debating changing insurances to be able to be seen again within [place]. Pt is wondering if provider would give one more yi refill on buPROPion (WELLBUTRIN SR) 200 MG 12 hr tablet and pantoprazole (PROTONIX) 40 MG EC tablet until she is able to be seen. Routing to provider to advise.  Genny Lee-  [place]

## 2022-05-25 ENCOUNTER — TELEPHONE (OUTPATIENT)
Dept: INTERNAL MEDICINE | Facility: CLINIC | Age: 72
End: 2022-05-25
Payer: COMMERCIAL

## 2022-05-25 NOTE — TELEPHONE ENCOUNTER
Received a refill request from [facility] for Voltaren Transdermail Gel 1%.    Originally prescribed by Dr Allen but patient had been seeing Swapna Edwards.    The medication is not on the patient's medication list.    [facility]    [address] [address]  Phone:[phone]    [facility]

## 2022-10-08 NOTE — TELEPHONE ENCOUNTER
Reason for Call:  Other call back    Detailed comments: Patient would like to request a refill for the prescription Bupropion at this time the patient has no secoundary  Health insurance and understand she will have to pay something out of pocket but she is currently out at this time,     [facility]    Phone Number Patient can be reached at: Home number on file [phone] (home)    Best Time: today,     Can we leave a detailed message on this number? YES\    Call taken on 10/6/2017 at 9:01 AM by Wili Telles     Alert-The patient is alert, awake and responds to voice. The patient is oriented to time, place, and person. The triage nurse is able to obtain subjective information.

## 2023-08-20 ENCOUNTER — NURSE TRIAGE (OUTPATIENT)
Dept: NURSING | Facility: CLINIC | Age: 73
End: 2023-08-20
Payer: COMMERCIAL

## 2023-08-20 NOTE — TELEPHONE ENCOUNTER
"Triage Call:    Caller: Patient  Patient has been in bed since Friday.  She isn't sure \"if I have pneumonia or something\".  She is hot and cold, having coughing and wheezing.    She also hasn't eaten or drank much since Friday.  It has been \"brewing\" for awhile, but she was  doing other things than addressing not feeling well.        Protocol Recommended Disposition: ED  sHe is going to try and see what urgent cares she can go to with her insurance, as [employer] doesn't take [employer] anymore.    Encouraged to call the number on back of insurance card or utilize their website portal.      Caller verbalized understanding of instructions and questions answered.      Umu Ivory RN on 8/20/2023 at 3:13 PM    Reason for Disposition   [1] Fever > 101 F (38.3 C) AND [2] age > 60 years    Additional Information   Negative: SEVERE difficulty breathing (e.g., struggling for each breath, speaks in single words)   Negative: [1] Breathing stopped AND [2] hasn't returned   Negative: Choking on something   Negative: Bluish (or gray) lips or face now   Negative: Difficult to awaken or acting confused (e.g., disoriented, slurred speech)   Negative: Passed out (i.e., lost consciousness, collapsed and was not responding)   Negative: Wheezing started suddenly after medicine, an allergic food or bee sting   Negative: Stridor   Negative: Slow, shallow and weak breathing   Negative: Sounds like a life-threatening emergency to the triager   Negative: [1] MODERATE difficulty breathing (e.g., speaks in phrases, SOB even at rest, pulse 100-120) AND [2] NEW-onset or WORSE than normal   Negative: Oxygen level (e.g., pulse oximetry) 90 percent or lower   Negative: Wheezing can be heard across the room   Negative: Drooling or spitting out saliva (because can't swallow)   Negative: History of prior \"blood clot\" in leg or lungs (i.e., deep vein thrombosis, pulmonary embolism)   Negative: History of inherited increased risk of blood clots (e.g., " "Factor 5 Leiden, Anti-thrombin 3, Protein C or Protein S deficiency, Prothrombin mutation)   Negative: Major surgery in the past month   Negative: Hip or leg fracture (broken bone) in past month (or had cast on leg or ankle in past month)   Negative: Illness requiring prolonged bedrest in past month (e.g., immobilization, long hospital stay)   Negative: Long-distance travel in past month (e.g., car, bus, train, plane; with trip lasting 6 or more hours)   Negative: Cancer treatment in past six months (or has cancer now)   Negative: Extra heart beats OR irregular heart beating  (i.e., \"palpitations\")   Negative: Fever > 103 F (39.4 C)    Protocols used: Breathing Difficulty-A-AH    "

## (undated) DEVICE — TUBING SUCTION 10'X3/16" N510

## (undated) DEVICE — Device

## (undated) DEVICE — ESU PENCIL W/COATED BLADE E2450H

## (undated) DEVICE — SPONGE LAP 18X18" X8435

## (undated) DEVICE — SYR BULB IRRIG 50ML LATEX FREE 0035280

## (undated) DEVICE — GLOVE PROTEXIS MICRO 6.0  2D73PM60

## (undated) DEVICE — ESU LIGASURE LAPAROSCOPIC BLUNT TIP SEALER 5MMX37CM LF1837

## (undated) DEVICE — CLIP HORIZON MED BLUE 002200

## (undated) DEVICE — SUCTION MANIFOLD DORNOCH ULTRA CART UL-CL500

## (undated) DEVICE — ENDO TROCAR BLUNT TIP KII BALLOON 12X100MM C0R47

## (undated) DEVICE — GLOVE PROTEXIS BLUE W/NEU-THERA 6.0  2D73EB60

## (undated) DEVICE — SOL NACL 0.9% INJ 1000ML BAG 07983-09

## (undated) DEVICE — PREP CHLORAPREP 26ML TINTED ORANGE  260815

## (undated) DEVICE — KIT PATIENT POSITIONING PIGAZZI LATEX FREE 40580

## (undated) DEVICE — SOL NACL 0.9% IRRIG 1000ML BOTTLE 07138-09

## (undated) DEVICE — SUCTION TIP YANKAUER STR K87

## (undated) DEVICE — SPECIMEN TRAP MUCOUS 40ML LUKI C30200A

## (undated) DEVICE — SU VICRYL 0 UR-6 27" J603H

## (undated) DEVICE — SU PDS II 0 TP-1 60" Z991G

## (undated) DEVICE — SU MONOCRYL 3-0 PS-1 27" Y936H

## (undated) DEVICE — BNDG ABDOMINAL BINDER 9X45-62" 79-89071

## (undated) DEVICE — WIPES FOLEY CARE SURESTEP PROVON DFC100

## (undated) DEVICE — PREP POVIDONE IODINE SOLUTION 10% 120ML

## (undated) DEVICE — DRAPE SHEET MED 44X70" 9355

## (undated) DEVICE — PREP POVIDONE IODINE SCRUB 7.5% 120ML

## (undated) DEVICE — LINEN TOWEL PACK X6 WHITE 5487

## (undated) DEVICE — SU VICRYL 0 CT-1 CR 8X27" UND JJ41G

## (undated) DEVICE — ESU GROUND PAD ADULT W/CORD E7507

## (undated) DEVICE — ESU ELEC BLADE 6" COATED E1450-6

## (undated) DEVICE — JELLY LUBRICATING SURGILUBE 2OZ TUBE

## (undated) DEVICE — CLIP HORIZON SM RED WIDE SLOT 001201

## (undated) DEVICE — LINEN TOWEL PACK X30 5481

## (undated) DEVICE — NDL INSUFFLATION 120MM VERRES 172015

## (undated) DEVICE — SU VICRYL 0 TIE 54" J608H

## (undated) DEVICE — DRAPE MAYO STAND 23X54 8337

## (undated) DEVICE — DRSG MEDIPORE 3 1/2X13 3/4" 3573

## (undated) DEVICE — PACK GOWN 3/PK DISP XL SBA32GPFCB

## (undated) DEVICE — CLIP HORIZON LG ORANGE 004200

## (undated) DEVICE — SYR 01ML 27GA 0.5" NDL TBC 309623

## (undated) DEVICE — GOWN SM/MED DISP 9505

## (undated) DEVICE — SOL WATER IRRIG 1000ML BOTTLE 2F7114

## (undated) DEVICE — PROTECTOR ARM ONE-STEP TRENDELENBURG 40418

## (undated) DEVICE — SUCTION IRR STRYKERFLOW II W/TIP 250-070-520

## (undated) DEVICE — ENDO TROCAR 05MM VERSAONE BLADELESS W/STD FIX CAN NONB5STF

## (undated) RX ORDER — FENTANYL CITRATE 50 UG/ML
INJECTION, SOLUTION INTRAMUSCULAR; INTRAVENOUS
Status: DISPENSED
Start: 2018-04-30

## (undated) RX ORDER — SODIUM CHLORIDE 9 MG/ML
INJECTION, SOLUTION INTRAVENOUS
Status: DISPENSED
Start: 2018-04-30

## (undated) RX ORDER — LIDOCAINE HYDROCHLORIDE 20 MG/ML
INJECTION, SOLUTION EPIDURAL; INFILTRATION; INTRACAUDAL; PERINEURAL
Status: DISPENSED
Start: 2018-04-30

## (undated) RX ORDER — METOPROLOL TARTRATE 25 MG/1
TABLET, FILM COATED ORAL
Status: DISPENSED
Start: 2018-04-30

## (undated) RX ORDER — ESMOLOL HYDROCHLORIDE 10 MG/ML
INJECTION INTRAVENOUS
Status: DISPENSED
Start: 2018-04-30

## (undated) RX ORDER — PROPOFOL 10 MG/ML
INJECTION, EMULSION INTRAVENOUS
Status: DISPENSED
Start: 2018-04-30

## (undated) RX ORDER — DEXAMETHASONE SODIUM PHOSPHATE 4 MG/ML
INJECTION, SOLUTION INTRA-ARTICULAR; INTRALESIONAL; INTRAMUSCULAR; INTRAVENOUS; SOFT TISSUE
Status: DISPENSED
Start: 2018-04-30

## (undated) RX ORDER — ONDANSETRON 2 MG/ML
INJECTION INTRAMUSCULAR; INTRAVENOUS
Status: DISPENSED
Start: 2018-04-30

## (undated) RX ORDER — HYDROMORPHONE HYDROCHLORIDE 1 MG/ML
INJECTION, SOLUTION INTRAMUSCULAR; INTRAVENOUS; SUBCUTANEOUS
Status: DISPENSED
Start: 2018-04-30

## (undated) RX ORDER — HYDRALAZINE HYDROCHLORIDE 20 MG/ML
INJECTION INTRAMUSCULAR; INTRAVENOUS
Status: DISPENSED
Start: 2018-04-30